# Patient Record
Sex: MALE | Race: WHITE | NOT HISPANIC OR LATINO | Employment: FULL TIME | URBAN - NONMETROPOLITAN AREA
[De-identification: names, ages, dates, MRNs, and addresses within clinical notes are randomized per-mention and may not be internally consistent; named-entity substitution may affect disease eponyms.]

---

## 2017-02-03 ENCOUNTER — HOSPITAL ENCOUNTER (OUTPATIENT)
Dept: LAB | Facility: MEDICAL CENTER | Age: 57
End: 2017-02-03
Attending: NURSE PRACTITIONER
Payer: COMMERCIAL

## 2017-02-03 LAB
T4 FREE SERPL-MCNC: 0.9 NG/DL (ref 0.53–1.43)
TSH SERPL DL<=0.005 MIU/L-ACNC: 4.31 UIU/ML (ref 0.3–3.7)

## 2017-02-03 PROCEDURE — 36415 COLL VENOUS BLD VENIPUNCTURE: CPT

## 2017-02-03 PROCEDURE — 84439 ASSAY OF FREE THYROXINE: CPT

## 2017-02-03 PROCEDURE — 84443 ASSAY THYROID STIM HORMONE: CPT

## 2017-02-10 ENCOUNTER — OFFICE VISIT (OUTPATIENT)
Dept: MEDICAL GROUP | Facility: PHYSICIAN GROUP | Age: 57
End: 2017-02-10
Payer: COMMERCIAL

## 2017-02-10 VITALS
RESPIRATION RATE: 12 BRPM | SYSTOLIC BLOOD PRESSURE: 146 MMHG | OXYGEN SATURATION: 96 % | HEART RATE: 56 BPM | WEIGHT: 280 LBS | DIASTOLIC BLOOD PRESSURE: 90 MMHG | TEMPERATURE: 98.4 F | HEIGHT: 72 IN | BODY MASS INDEX: 37.93 KG/M2

## 2017-02-10 DIAGNOSIS — E03.9 HYPOTHYROIDISM, UNSPECIFIED TYPE: ICD-10-CM

## 2017-02-10 DIAGNOSIS — Z23 NEED FOR TDAP VACCINATION: ICD-10-CM

## 2017-02-10 DIAGNOSIS — R93.89 ABNORMAL CXR: ICD-10-CM

## 2017-02-10 DIAGNOSIS — I35.0 AORTIC VALVE STENOSIS, UNSPECIFIED ETIOLOGY: ICD-10-CM

## 2017-02-10 PROCEDURE — 90715 TDAP VACCINE 7 YRS/> IM: CPT | Performed by: NURSE PRACTITIONER

## 2017-02-10 PROCEDURE — 99213 OFFICE O/P EST LOW 20 MIN: CPT | Mod: 25 | Performed by: NURSE PRACTITIONER

## 2017-02-10 PROCEDURE — 90471 IMMUNIZATION ADMIN: CPT | Performed by: NURSE PRACTITIONER

## 2017-02-10 RX ORDER — LEVOTHYROXINE SODIUM 0.03 MG/1
25 TABLET ORAL
Qty: 90 TAB | Refills: 0 | Status: SHIPPED | OUTPATIENT
Start: 2017-02-10 | End: 2017-05-25 | Stop reason: SDUPTHER

## 2017-02-10 NOTE — ASSESSMENT & PLAN NOTE
Patient takes his medications in am, no missed medications. Patient is having some trouble with sleep, wonders if it is his thyroid is bothering it. Patient will recheck in May.    Ref. Range 9/2/2015 10:09 10/10/2015 07:20 12/2/2016 06:40 2/3/2017 06:07   TSH Latest Ref Range: 0.300-3.700 uIU/mL   5.370 (H) 4.310 (H)   Free T-4 Latest Ref Range: 0.53-1.43 ng/dL   0.90 0.90

## 2017-02-10 NOTE — PROGRESS NOTES
Chief Complaint   Patient presents with   • Results     labs       HISTORY OF PRESENT ILLNESS: Patient is a 56 y.o. male patient who is here to follow-up on his thyroid. Patient was identified as having hypothyroidism 3 months ago. He was started on 25 µg of levothyroxine. And had a retest. He is almost euthyroid. We'll continue to keep him on 25 µg and recheck in a few more months. Patient denies any symptoms such as mental fog, dry skin, brittle nails or hair that is falling out.    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        Hypothyroid        Patient takes his medications in am, no missed medications. Patient is having some trouble with sleep, wonders if it is his thyroid is bothering it. Patient will recheck in May.    Ref. Range 9/2/2015 10:09 10/10/2015 07:20 12/2/2016 06:40 2/3/2017 06:07   TSH Latest Ref Range: 0.300-3.700 uIU/mL   5.370 (H) 4.310 (H)   Free T-4 Latest Ref Range: 0.53-1.43 ng/dL   0.90 0.90       Aortic stenosis  Aorta - 2014 result.   Aortic root, 4.9 cm and the ascending aorta, 3.8 cm dilatation.    Patient had a recheck on his aorta that shows 3.1 cm dilatation. And some calcification around the aortic valve    Patient received a call from Cardiology. Patient will follow up soon. No chest pain, does feel a bit winded. 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     Abnormal CXR  Patient had chest xray over 2 years. Patient will follow up with cardiology first to discuss.              1.  No acute cardiopulmonary process identified.    2.  Enlarged cardiac silhouette    3.  Pulmonary hyperinflation suggesting chronic obstructive pulmonary disease         Reading Provider     Patient continues to need follow-up regarding his possible COPD, enlarged heart.        Allergies:Review of patient's allergies indicates no known allergies.    Current Outpatient Prescriptions Ordered in Epic   Medication Sig  Dispense Refill   • levothyroxine (SYNTHROID) 25 MCG Tab Take 1 Tab by mouth Every morning on an empty stomach. 90 Tab 0   • nystatin/triamcinolone (MYCOLOG) 169709-4.1 UNIT/GM-% Cream      • pravastatin (PRAVACHOL) 20 MG Tab TAKE TWO TABLETS BY MOUTH ONCE DAILY 90 Tab 1   • hydrochlorothiazide (HYDRODIURIL) 25 MG Tab TAKE ONE TABLET BY MOUTH ONCE DAILY 90 Tab 1   • meloxicam (MOBIC) 15 MG tablet Take 1 Tab by mouth every day. 30 Tab 3   • meloxicam (MOBIC) 15 MG tablet Take 1 Tab by mouth every day. 30 Tab 3   • Aug Betamethasone Dipropionate (DIPROLENE-AF) 0.05 % Cream      • aspirin 81 MG tablet Take 81 mg by mouth every day.     • Cholecalciferol (VITAMIN D-3 PO) Take 2,000 mg by mouth every day.       No current [facility]-ordered facility-administered medications on file.       Past Medical History   Diagnosis Date   • Shortness of breath 10/14/2014   • BMI 37.0-37.9, adult 10/14/2014   • Hyperlipidemia 10/14/2014   • Right knee pain 10/15/2014   • Dry skin 10/15/2014   • Accessory skin tags 10/15/2014   • Enlarged heart 10/15/2014   • Unspecified vitamin D deficiency 10/21/2014   • Hypertension    • Heart burn    • Indigestion    • Dental disorder      partial       Social History   Substance Use Topics   • Smoking status: Former Smoker     Quit date: 2013   • Smokeless tobacco: Never Used   • Alcohol Use: Yes      Comment: 1-2 a night       Family Status   Relation Status Death Age   • Mother     • Father     • Sister     • Brother Alive      Family History   Problem Relation Age of Onset   • Diabetes Mother    • Cancer Father    • Heart Disease Father    • Heart Failure Father        ROS: As documented in my HPI      Exam:  Blood pressure 146/90, pulse 56, temperature 36.9 °C (98.4 °F), resp. rate 12, height 1.829 m (6'), weight 127.007 kg (280 lb), SpO2 96 %.  General:  Well nourished, well developed male in NAD  Head: No lesions, Non tender scalp  Neck: Supple. Symmetric    Pulmonary:  Normal effort. No rales, ronchi, or wheezing.  Cardiovascular: Regular rate and rhythm without murmur.   Extremities: no clubbing, cyanosis, or edema.  Psych: Alert and oriented x3. Normal mood and affect.   Neurological: No focal deficits    Please note that this dictation was created using voice recognition software. I have made every reasonable attempt to correct obvious errors, but I expect that there are errors of grammar and possibly content that I did not discover before finalizing the note.    Assessment/Plan:  1. Hypothyroidism, unspecified type -  Almost controlled, will continue with RX and recheck  levothyroxine (SYNTHROID) 25 MCG Tab    TSH+FREE T4   2. Aortic valve stenosis, unspecified etiology   patient to follow-up with cardiology before next visit.    3. Abnormal CXR   patient to follow up with cardiology before next visit.    4. Need for Tdap vaccination  Tdap =>6yo IM

## 2017-02-10 NOTE — ASSESSMENT & PLAN NOTE
Patient had chest xray over 2 years. Patient will follow up with cardiology first to discuss.              1.  No acute cardiopulmonary process identified.    2.  Enlarged cardiac silhouette    3.  Pulmonary hyperinflation suggesting chronic obstructive pulmonary disease         Reading Provider

## 2017-02-10 NOTE — MR AVS SNAPSHOT
Gagandeep Durbin Jr.   2/10/2017 8:00 AM   Office Visit   MRN: [id number]    Department:  [facility]   Dept Phone:  [phone]    Description:  Male : 1960   Provider:  FELIX Fournier           Reason for Visit     Results labs      Allergies as of 2/10/2017     No Known Allergies      You were diagnosed with     Hypothyroidism, unspecified type   [9532426]       Aortic valve stenosis, unspecified etiology   [1477142]       Abnormal CXR   [641656]       Need for Tdap vaccination   [103233]         Vital Signs     Blood Pressure Pulse Temperature Respirations Height Weight    146/90 mmHg 56 36.9 °C (98.4 °F) 12 1.829 m (6') 127.007 kg (280 lb)    Body Mass Index Oxygen Saturation Smoking Status             37.97 kg/m2 96% Former Smoker         Basic Information     Date Of Birth Sex Race Ethnicity Preferred Language    1960 Male White Non- English      Your appointments     2017 11:20 AM   FOLLOW UP with Steve Ruelas M.D.   [facility] (--)    [address], [facility] [address]  [facility] [id number] [phone]            2017  8:00 AM   Adult Draw/Collection with [facility] (--)    [address] [place] [address] [address] [place] [address] [phone]            May 05, 2017  8:00 AM   Established Patient with FELIX Fournier   [facility] [place]    [address] [place] [address]  [place] [id number] [phone]           You will be receiving a confirmation call a few days before your appointment from our automated call confirmation system.              Problem List              ICD-10-CM Priority Class Noted - Resolved    Shortness of breath R06.02 High  10/14/2014 - Present    BMI 37.0-37.9, adult Z68.37   10/14/2014 - Present    Hyperlipidemia E78.5 High  10/14/2014 - Present    Right knee pain M25.561   10/15/2014 - Present    Dry skin L85.3   10/15/2014 - Present    Accessory skin tags Q82.8    10/15/2014 - Present    Vitamin D deficiency E55.9   10/21/2014 - Present    HTN (hypertension) I10 High  10/30/2014 - Present    Abnormal CXR R93.8 Medium  11/24/2014 - Present    Abnormal EKG R94.31   11/24/2014 - Present    Aortic stenosis I35.0 Medium  1/20/2015 - Present    Aortic valve disorder I35.9 High  1/28/2015 - Present    Hypothyroid E03.9   12/6/2016 - Present    Need for Tdap vaccination Z23   2/10/2017 - Present      Health Maintenance        Date Due Completion Dates    IMM DTaP/Tdap/Td Vaccine (1 - Tdap) 3/25/1977 ---    IMM INFLUENZA (1) 9/1/2016 ---    COLONOSCOPY 10/1/2021 10/1/2016 (N/S), 3/1/2013 (N/S)    Override on 10/1/2016: (N/S)    Override on 3/1/2013: (N/S)            Current Immunizations     Tdap Vaccine  Incomplete      Below and/or attached are the medications your provider expects you to take. Review all of your home medications and newly ordered medications with your provider and/or pharmacist. Follow medication instructions as directed by your provider and/or pharmacist. Please keep your medication list with you and share with your provider. Update the information when medications are discontinued, doses are changed, or new medications (including over-the-counter products) are added; and carry medication information at all times in the event of emergency situations     Allergies:  No Known Allergies          Medications  Valid as of: February 10, 2017 -  8:41 AM    Generic Name Brand Name Tablet Size Instructions for use    Aspirin (Tab) aspirin 81 MG Take 81 mg by mouth every day.        Betamethasone Dipropionate Aug (Cream) DIPROLENE-AF 0.05 %         Cholecalciferol   Take 2,000 mg by mouth every day.        HydroCHLOROthiazide (Tab) HYDRODIURIL 25 MG TAKE ONE TABLET BY MOUTH ONCE DAILY        Levothyroxine Sodium (Tab) SYNTHROID 25 MCG Take 1 Tab by mouth Every morning on an empty stomach.        Meloxicam (Tab) MOBIC 15 MG Take 1 Tab by mouth every day.        Meloxicam  (Tab) MOBIC 15 MG Take 1 Tab by mouth every day.        Nystatin-Triamcinolone (Cream) MYCOLOG 431955-2.1 UNIT/GM-%         Pravastatin Sodium (Tab) PRAVACHOL 20 MG TAKE TWO TABLETS BY MOUTH ONCE DAILY        .                 Medicines prescribed today were sent to:     [facility] [address] [place] [address], [place] - [address] [facility]    [address] [facility] [place] [address]    Phone: [phone] Fax: [phone]    Open 24 Hours?: No      Medication refill instructions:       If your prescription bottle indicates you have medication refills left, it is not necessary to call your provider’s office. Please contact your pharmacy and they will refill your medication.    If your prescription bottle indicates you do not have any refills left, you may request refills at any time through one of the following ways: The online [employer] system (except Urgent Care), by calling your provider’s office, or by asking your pharmacy to contact your provider’s office with a refill request. Medication refills are processed only during regular business hours and may not be available until the next business day. Your provider may request additional information or to have a follow-up visit with you prior to refilling your medication.   *Please Note: Medication refills are assigned a new Rx number when refilled electronically. Your pharmacy may indicate that no refills were authorized even though a new prescription for the same medication is available at the pharmacy. Please request the medicine by name with the pharmacy before contacting your provider for a refill.           [employer] Access Code: [email or web address]  Expires: 3/7/2017  9:58 AM    [employer]  A secure, online tool to manage your health information     [employer]® is a secure, online tool that connects you to your personalized health information from the privacy of your home -- day or night - making it very easy for you to manage your healthcare. Once the  activation process is completed, you can even access your medical information using the [employer] katiana, which is available for free in the Apple Katiana store or Google Play store.     [employer] provides the following levels of access (as shown below):   My Chart Features   [facility] Doctor [facility]  Specialists [facility]  Urgent  Care Non-[facility]  Primary Care  Doctor   Email your healthcare team securely and privately 24/7 X X X    Manage appointments: schedule your next appointment; view details of past/upcoming appointments X      Request prescription refills. X      View recent personal medical records, including lab and immunizations X X X X   View health record, including health history, allergies, medications X X X X   Read reports about your outpatient visits, procedures, consult and ER notes X X X X   See your discharge summary, which is a recap of your hospital and/or ER visit that includes your diagnosis, lab results, and care plan. X X       How to register for [employer]:  1. Go to  [email or web address].  2. Click on the Sign Up Now box, which takes you to the New Member Sign Up page. You will need to provide the following information:  a. Enter your [employer] Access Code exactly as it appears at the top of this page. (You will not need to use this code after you’ve completed the sign-up process. If you do not sign up before the expiration date, you must request a new code.)   b. Enter your date of birth.   c. Enter your home email address.   d. Click Submit, and follow the next screen’s instructions.  3. Create a [employer] ID. This will be your [employer] login ID and cannot be changed, so think of one that is secure and easy to remember.  4. Create a [employer] password. You can change your password at any time.  5. Enter your Password Reset Question and Answer. This can be used at a later time if you forget your password.   6. Enter your e-mail address. This allows you to receive e-mail notifications when new  information is available in [employer].  7. Click Sign Up. You can now view your health information.    For assistance activating your [employer] account, call [phone]

## 2017-02-10 NOTE — ASSESSMENT & PLAN NOTE
Aorta - 2014 result.   Aortic root, 4.9 cm and the ascending aorta, 3.8 cm dilatation.      Patient received a call from Cardiology. Patient will follow up soon. No chest pain, does feel a bit winded. 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

## 2017-02-25 ENCOUNTER — HOSPITAL ENCOUNTER (INPATIENT)
Facility: MEDICAL CENTER | Age: 57
LOS: 1 days | DRG: 281 | End: 2017-02-26
Attending: EMERGENCY MEDICINE | Admitting: INTERNAL MEDICINE
Payer: COMMERCIAL

## 2017-02-25 ENCOUNTER — APPOINTMENT (OUTPATIENT)
Dept: RADIOLOGY | Facility: MEDICAL CENTER | Age: 57
DRG: 281 | End: 2017-02-25
Attending: EMERGENCY MEDICINE
Payer: COMMERCIAL

## 2017-02-25 ENCOUNTER — RESOLUTE PROFESSIONAL BILLING HOSPITAL PROF FEE (OUTPATIENT)
Dept: HOSPITALIST | Facility: MEDICAL CENTER | Age: 57
End: 2017-02-25
Payer: COMMERCIAL

## 2017-02-25 DIAGNOSIS — I49.9 CARDIAC ARRHYTHMIA, UNSPECIFIED CARDIAC ARRHYTHMIA TYPE: ICD-10-CM

## 2017-02-25 DIAGNOSIS — R55 NEAR SYNCOPE: ICD-10-CM

## 2017-02-25 PROBLEM — R42 POSTURAL DIZZINESS WITH PRESYNCOPE: Status: ACTIVE | Noted: 2017-02-25

## 2017-02-25 LAB
ALBUMIN SERPL BCP-MCNC: 3.6 G/DL (ref 3.2–4.9)
ALBUMIN/GLOB SERPL: 1.2 G/DL
ALP SERPL-CCNC: 50 U/L (ref 30–99)
ALT SERPL-CCNC: 31 U/L (ref 2–50)
ANION GAP SERPL CALC-SCNC: 7 MMOL/L (ref 0–11.9)
APPEARANCE UR: CLEAR
APTT PPP: 27 SEC (ref 24.7–36)
AST SERPL-CCNC: 22 U/L (ref 12–45)
BASOPHILS # BLD AUTO: 0.6 % (ref 0–1.8)
BASOPHILS # BLD: 0.04 K/UL (ref 0–0.12)
BILIRUB SERPL-MCNC: 0.8 MG/DL (ref 0.1–1.5)
BILIRUB UR QL STRIP.AUTO: NEGATIVE
BNP SERPL-MCNC: 398 PG/ML (ref 0–100)
BUN SERPL-MCNC: 25 MG/DL (ref 8–22)
CALCIUM SERPL-MCNC: 9.5 MG/DL (ref 8.5–10.5)
CHLORIDE SERPL-SCNC: 103 MMOL/L (ref 96–112)
CO2 SERPL-SCNC: 23 MMOL/L (ref 20–33)
COLOR UR: YELLOW
CREAT SERPL-MCNC: 1.1 MG/DL (ref 0.5–1.4)
CULTURE IF INDICATED INDCX: YES UA CULTURE
EKG IMPRESSION: NORMAL
EOSINOPHIL # BLD AUTO: 0.06 K/UL (ref 0–0.51)
EOSINOPHIL NFR BLD: 0.9 % (ref 0–6.9)
EPI CELLS #/AREA URNS HPF: ABNORMAL /HPF
ERYTHROCYTE [DISTWIDTH] IN BLOOD BY AUTOMATED COUNT: 42.1 FL (ref 35.9–50)
ETHANOL BLD-MCNC: 0 G/DL
GFR SERPL CREATININE-BSD FRML MDRD: >60 ML/MIN/1.73 M 2
GLOBULIN SER CALC-MCNC: 2.9 G/DL (ref 1.9–3.5)
GLUCOSE SERPL-MCNC: 114 MG/DL (ref 65–99)
GLUCOSE UR STRIP.AUTO-MCNC: NEGATIVE MG/DL
HCT VFR BLD AUTO: 45.3 % (ref 42–52)
HGB BLD-MCNC: 15.5 G/DL (ref 14–18)
IMM GRANULOCYTES # BLD AUTO: 0.04 K/UL (ref 0–0.11)
IMM GRANULOCYTES NFR BLD AUTO: 0.6 % (ref 0–0.9)
INR PPP: 0.99 (ref 0.87–1.13)
KETONES UR STRIP.AUTO-MCNC: NEGATIVE MG/DL
LEUKOCYTE ESTERASE UR QL STRIP.AUTO: ABNORMAL
LYMPHOCYTES # BLD AUTO: 1.16 K/UL (ref 1–4.8)
LYMPHOCYTES NFR BLD: 16.5 % (ref 22–41)
MAGNESIUM SERPL-MCNC: 1.7 MG/DL (ref 1.5–2.5)
MCH RBC QN AUTO: 31.8 PG (ref 27–33)
MCHC RBC AUTO-ENTMCNC: 34.2 G/DL (ref 33.7–35.3)
MCV RBC AUTO: 92.8 FL (ref 81.4–97.8)
MICRO URNS: ABNORMAL
MONOCYTES # BLD AUTO: 0.47 K/UL (ref 0–0.85)
MONOCYTES NFR BLD AUTO: 6.7 % (ref 0–13.4)
NEUTROPHILS # BLD AUTO: 5.27 K/UL (ref 1.82–7.42)
NEUTROPHILS NFR BLD: 74.7 % (ref 44–72)
NITRITE UR QL STRIP.AUTO: NEGATIVE
NRBC # BLD AUTO: 0 K/UL
NRBC BLD AUTO-RTO: 0 /100 WBC
PH UR STRIP.AUTO: 6 [PH]
PHOSPHATE SERPL-MCNC: 3.2 MG/DL (ref 2.5–4.5)
PLATELET # BLD AUTO: 191 K/UL (ref 164–446)
PMV BLD AUTO: 11 FL (ref 9–12.9)
POTASSIUM SERPL-SCNC: 4.4 MMOL/L (ref 3.6–5.5)
PROT SERPL-MCNC: 6.5 G/DL (ref 6–8.2)
PROT UR QL STRIP: NEGATIVE MG/DL
PROTHROMBIN TIME: 13.4 SEC (ref 12–14.6)
RBC # BLD AUTO: 4.88 M/UL (ref 4.7–6.1)
RBC # URNS HPF: ABNORMAL /HPF
RBC UR QL AUTO: NEGATIVE
SODIUM SERPL-SCNC: 133 MMOL/L (ref 135–145)
SP GR UR STRIP.AUTO: 1.01
TROPONIN I SERPL-MCNC: 0.06 NG/ML (ref 0–0.04)
WBC # BLD AUTO: 7 K/UL (ref 4.8–10.8)
WBC #/AREA URNS HPF: ABNORMAL /HPF

## 2017-02-25 PROCEDURE — 93005 ELECTROCARDIOGRAM TRACING: CPT | Performed by: INTERNAL MEDICINE

## 2017-02-25 PROCEDURE — 87086 URINE CULTURE/COLONY COUNT: CPT

## 2017-02-25 PROCEDURE — 84100 ASSAY OF PHOSPHORUS: CPT

## 2017-02-25 PROCEDURE — 71010 DX-CHEST-PORTABLE (1 VIEW): CPT

## 2017-02-25 PROCEDURE — 770020 HCHG ROOM/CARE - TELE (206)

## 2017-02-25 PROCEDURE — 99223 1ST HOSP IP/OBS HIGH 75: CPT | Performed by: INTERNAL MEDICINE

## 2017-02-25 PROCEDURE — 99285 EMERGENCY DEPT VISIT HI MDM: CPT

## 2017-02-25 PROCEDURE — 36415 COLL VENOUS BLD VENIPUNCTURE: CPT

## 2017-02-25 PROCEDURE — 84484 ASSAY OF TROPONIN QUANT: CPT

## 2017-02-25 PROCEDURE — 85025 COMPLETE CBC W/AUTO DIFF WBC: CPT

## 2017-02-25 PROCEDURE — 93005 ELECTROCARDIOGRAM TRACING: CPT | Performed by: EMERGENCY MEDICINE

## 2017-02-25 PROCEDURE — A9270 NON-COVERED ITEM OR SERVICE: HCPCS | Performed by: INTERNAL MEDICINE

## 2017-02-25 PROCEDURE — 80053 COMPREHEN METABOLIC PANEL: CPT

## 2017-02-25 PROCEDURE — 700102 HCHG RX REV CODE 250 W/ 637 OVERRIDE(OP): Performed by: INTERNAL MEDICINE

## 2017-02-25 PROCEDURE — 81001 URINALYSIS AUTO W/SCOPE: CPT

## 2017-02-25 PROCEDURE — 80307 DRUG TEST PRSMV CHEM ANLYZR: CPT

## 2017-02-25 PROCEDURE — 70450 CT HEAD/BRAIN W/O DYE: CPT

## 2017-02-25 PROCEDURE — 83880 ASSAY OF NATRIURETIC PEPTIDE: CPT

## 2017-02-25 PROCEDURE — 83735 ASSAY OF MAGNESIUM: CPT

## 2017-02-25 PROCEDURE — 85610 PROTHROMBIN TIME: CPT

## 2017-02-25 PROCEDURE — 85730 THROMBOPLASTIN TIME PARTIAL: CPT

## 2017-02-25 PROCEDURE — 700111 HCHG RX REV CODE 636 W/ 250 OVERRIDE (IP): Performed by: INTERNAL MEDICINE

## 2017-02-25 PROCEDURE — 93005 ELECTROCARDIOGRAM TRACING: CPT

## 2017-02-25 PROCEDURE — 306589 SLEEVE,VASO CALF LARGE: Performed by: INTERNAL MEDICINE

## 2017-02-25 RX ORDER — PROMETHAZINE HYDROCHLORIDE 25 MG/1
12.5-25 TABLET ORAL EVERY 4 HOURS PRN
Status: DISCONTINUED | OUTPATIENT
Start: 2017-02-25 | End: 2017-02-26 | Stop reason: HOSPADM

## 2017-02-25 RX ORDER — PRAVASTATIN SODIUM 20 MG
40 TABLET ORAL EVERY EVENING
Status: DISCONTINUED | OUTPATIENT
Start: 2017-02-26 | End: 2017-02-26 | Stop reason: HOSPADM

## 2017-02-25 RX ORDER — LORAZEPAM 1 MG/1
0.5 TABLET ORAL EVERY 4 HOURS PRN
Status: DISCONTINUED | OUTPATIENT
Start: 2017-02-25 | End: 2017-02-26 | Stop reason: HOSPADM

## 2017-02-25 RX ORDER — ENEMA 19; 7 G/133ML; G/133ML
1 ENEMA RECTAL
Status: DISCONTINUED | OUTPATIENT
Start: 2017-02-25 | End: 2017-02-26 | Stop reason: HOSPADM

## 2017-02-25 RX ORDER — LORAZEPAM 1 MG/1
3 TABLET ORAL
Status: DISCONTINUED | OUTPATIENT
Start: 2017-02-25 | End: 2017-02-26 | Stop reason: HOSPADM

## 2017-02-25 RX ORDER — LORAZEPAM 2 MG/ML
1.5 INJECTION INTRAMUSCULAR
Status: DISCONTINUED | OUTPATIENT
Start: 2017-02-25 | End: 2017-02-26 | Stop reason: HOSPADM

## 2017-02-25 RX ORDER — FOLIC ACID 1 MG/1
1 TABLET ORAL DAILY
Status: CANCELLED | OUTPATIENT
Start: 2017-02-26 | End: 2017-03-02

## 2017-02-25 RX ORDER — LORAZEPAM 2 MG/ML
2 INJECTION INTRAMUSCULAR
Status: DISCONTINUED | OUTPATIENT
Start: 2017-02-25 | End: 2017-02-26 | Stop reason: HOSPADM

## 2017-02-25 RX ORDER — AMOXICILLIN 250 MG
1 CAPSULE ORAL NIGHTLY
Status: DISCONTINUED | OUTPATIENT
Start: 2017-02-25 | End: 2017-02-26 | Stop reason: HOSPADM

## 2017-02-25 RX ORDER — THIAMINE MONONITRATE (VIT B1) 100 MG
100 TABLET ORAL DAILY
Status: CANCELLED | OUTPATIENT
Start: 2017-02-26 | End: 2017-03-02

## 2017-02-25 RX ORDER — LORAZEPAM 2 MG/ML
0.5 INJECTION INTRAMUSCULAR EVERY 4 HOURS PRN
Status: DISCONTINUED | OUTPATIENT
Start: 2017-02-25 | End: 2017-02-26 | Stop reason: HOSPADM

## 2017-02-25 RX ORDER — DOCUSATE SODIUM 100 MG/1
100 CAPSULE, LIQUID FILLED ORAL 2 TIMES DAILY
Status: DISCONTINUED | OUTPATIENT
Start: 2017-02-25 | End: 2017-02-26 | Stop reason: HOSPADM

## 2017-02-25 RX ORDER — LACTULOSE 20 G/30ML
30 SOLUTION ORAL
Status: DISCONTINUED | OUTPATIENT
Start: 2017-02-25 | End: 2017-02-26 | Stop reason: HOSPADM

## 2017-02-25 RX ORDER — LORAZEPAM 2 MG/ML
1 INJECTION INTRAMUSCULAR
Status: DISCONTINUED | OUTPATIENT
Start: 2017-02-25 | End: 2017-02-26 | Stop reason: HOSPADM

## 2017-02-25 RX ORDER — FUROSEMIDE 10 MG/ML
40 INJECTION INTRAMUSCULAR; INTRAVENOUS
Status: DISCONTINUED | OUTPATIENT
Start: 2017-02-25 | End: 2017-02-26

## 2017-02-25 RX ORDER — BETAMETHASONE DIPROPIONATE 0.5 MG/G
1 CREAM TOPICAL 2 TIMES DAILY
COMMUNITY
End: 2017-12-14

## 2017-02-25 RX ORDER — LORAZEPAM 1 MG/1
4 TABLET ORAL
Status: DISCONTINUED | OUTPATIENT
Start: 2017-02-25 | End: 2017-02-26 | Stop reason: HOSPADM

## 2017-02-25 RX ORDER — BISACODYL 10 MG
10 SUPPOSITORY, RECTAL RECTAL
Status: DISCONTINUED | OUTPATIENT
Start: 2017-02-25 | End: 2017-02-26 | Stop reason: HOSPADM

## 2017-02-25 RX ORDER — PROMETHAZINE HYDROCHLORIDE 25 MG/1
12.5-25 SUPPOSITORY RECTAL EVERY 4 HOURS PRN
Status: DISCONTINUED | OUTPATIENT
Start: 2017-02-25 | End: 2017-02-26 | Stop reason: HOSPADM

## 2017-02-25 RX ORDER — FOLIC ACID 1 MG/1
1 TABLET ORAL DAILY
Status: DISCONTINUED | OUTPATIENT
Start: 2017-02-25 | End: 2017-02-26 | Stop reason: HOSPADM

## 2017-02-25 RX ORDER — CHOLECALCIFEROL (VITAMIN D3) 25 MCG
2000 CAPSULE ORAL DAILY
Status: DISCONTINUED | OUTPATIENT
Start: 2017-02-25 | End: 2017-02-25

## 2017-02-25 RX ORDER — LORAZEPAM 1 MG/1
2 TABLET ORAL
Status: DISCONTINUED | OUTPATIENT
Start: 2017-02-25 | End: 2017-02-26 | Stop reason: HOSPADM

## 2017-02-25 RX ORDER — ONDANSETRON 2 MG/ML
4 INJECTION INTRAMUSCULAR; INTRAVENOUS EVERY 4 HOURS PRN
Status: DISCONTINUED | OUTPATIENT
Start: 2017-02-25 | End: 2017-02-26 | Stop reason: HOSPADM

## 2017-02-25 RX ORDER — HEPARIN SODIUM 5000 [USP'U]/ML
5000 INJECTION, SOLUTION INTRAVENOUS; SUBCUTANEOUS EVERY 8 HOURS
Status: DISCONTINUED | OUTPATIENT
Start: 2017-02-25 | End: 2017-02-26 | Stop reason: HOSPADM

## 2017-02-25 RX ORDER — MELOXICAM 15 MG/1
15 TABLET ORAL DAILY
Status: DISCONTINUED | OUTPATIENT
Start: 2017-02-25 | End: 2017-02-25

## 2017-02-25 RX ORDER — LEVOTHYROXINE SODIUM 0.03 MG/1
25 TABLET ORAL
Status: DISCONTINUED | OUTPATIENT
Start: 2017-02-26 | End: 2017-02-26 | Stop reason: HOSPADM

## 2017-02-25 RX ORDER — THIAMINE MONONITRATE (VIT B1) 100 MG
100 TABLET ORAL DAILY
Status: DISCONTINUED | OUTPATIENT
Start: 2017-02-25 | End: 2017-02-26 | Stop reason: HOSPADM

## 2017-02-25 RX ORDER — LISINOPRIL 5 MG/1
5 TABLET ORAL DAILY
Status: DISCONTINUED | OUTPATIENT
Start: 2017-02-25 | End: 2017-02-26 | Stop reason: HOSPADM

## 2017-02-25 RX ORDER — POTASSIUM CHLORIDE 20 MEQ/1
40 TABLET, EXTENDED RELEASE ORAL DAILY
Status: DISCONTINUED | OUTPATIENT
Start: 2017-02-25 | End: 2017-02-26 | Stop reason: HOSPADM

## 2017-02-25 RX ORDER — AMOXICILLIN 250 MG
1 CAPSULE ORAL
Status: DISCONTINUED | OUTPATIENT
Start: 2017-02-25 | End: 2017-02-26 | Stop reason: HOSPADM

## 2017-02-25 RX ORDER — ONDANSETRON 4 MG/1
4 TABLET, ORALLY DISINTEGRATING ORAL EVERY 4 HOURS PRN
Status: DISCONTINUED | OUTPATIENT
Start: 2017-02-25 | End: 2017-02-26 | Stop reason: HOSPADM

## 2017-02-25 RX ORDER — LORAZEPAM 1 MG/1
1 TABLET ORAL EVERY 4 HOURS PRN
Status: DISCONTINUED | OUTPATIENT
Start: 2017-02-25 | End: 2017-02-26 | Stop reason: HOSPADM

## 2017-02-25 RX ADMIN — HEPARIN SODIUM 5000 UNITS: 5000 INJECTION, SOLUTION INTRAVENOUS; SUBCUTANEOUS at 21:44

## 2017-02-25 RX ADMIN — FOLIC ACID 1 MG: 1 TABLET ORAL at 15:18

## 2017-02-25 RX ADMIN — POTASSIUM CHLORIDE 40 MEQ: 1500 TABLET, EXTENDED RELEASE ORAL at 15:18

## 2017-02-25 RX ADMIN — THERA TABS 1 TABLET: TAB at 15:18

## 2017-02-25 RX ADMIN — FUROSEMIDE 40 MG: 10 INJECTION, SOLUTION INTRAVENOUS at 15:19

## 2017-02-25 RX ADMIN — Medication 100 MG: at 15:17

## 2017-02-25 RX ADMIN — LISINOPRIL 5 MG: 5 TABLET ORAL at 15:17

## 2017-02-25 ASSESSMENT — LIFESTYLE VARIABLES
ON A TYPICAL DAY WHEN YOU DRINK ALCOHOL HOW MANY DRINKS DO YOU HAVE: 16
ANXIETY: NO ANXIETY (AT EASE)
AUDITORY DISTURBANCES: NOT PRESENT
HOW MANY TIMES IN THE PAST YEAR HAVE YOU HAD 5 OR MORE DRINKS IN A DAY: 250
EVER HAD A DRINK FIRST THING IN THE MORNING TO STEADY YOUR NERVES TO GET RID OF A HANGOVER: NO
TOTAL SCORE: 1
NAUSEA AND VOMITING: NO NAUSEA AND NO VOMITING
TOTAL SCORE: 0
VISUAL DISTURBANCES: NOT PRESENT
TOTAL SCORE: 1
TREMOR: NO TREMOR
HEADACHE, FULLNESS IN HEAD: NOT PRESENT
AGITATION: NORMAL ACTIVITY
ANXIETY: NO ANXIETY (AT EASE)
AGITATION: NORMAL ACTIVITY
VISUAL DISTURBANCES: NOT PRESENT
AUDITORY DISTURBANCES: NOT PRESENT
AGITATION: NORMAL ACTIVITY
CONSUMPTION TOTAL: POSITIVE
TREMOR: NO TREMOR
ORIENTATION AND CLOUDING OF SENSORIUM: ORIENTED AND CAN DO SERIAL ADDITIONS
EVER_SMOKED: YES
TOTAL SCORE: 0
PACK_YEARS: 42
HAVE YOU EVER FELT YOU SHOULD CUT DOWN ON YOUR DRINKING: YES
VISUAL DISTURBANCES: NOT PRESENT
TOTAL SCORE: 1
PAROXYSMAL SWEATS: NO SWEAT VISIBLE
EVER FELT BAD OR GUILTY ABOUT YOUR DRINKING: NO
PAROXYSMAL SWEATS: NO SWEAT VISIBLE
NAUSEA AND VOMITING: NO NAUSEA AND NO VOMITING
NAUSEA AND VOMITING: NO NAUSEA AND NO VOMITING
ORIENTATION AND CLOUDING OF SENSORIUM: ORIENTED AND CAN DO SERIAL ADDITIONS
PAROXYSMAL SWEATS: NO SWEAT VISIBLE
ALCOHOL_USE: YES
HEADACHE, FULLNESS IN HEAD: NOT PRESENT
ANXIETY: NO ANXIETY (AT EASE)
AUDITORY DISTURBANCES: NOT PRESENT
TREMOR: NO TREMOR
HEADACHE, FULLNESS IN HEAD: NOT PRESENT
HAVE PEOPLE ANNOYED YOU BY CRITICIZING YOUR DRINKING: NO
TOTAL SCORE: 0
ORIENTATION AND CLOUDING OF SENSORIUM: ORIENTED AND CAN DO SERIAL ADDITIONS
AVERAGE NUMBER OF DAYS PER WEEK YOU HAVE A DRINK CONTAINING ALCOHOL: 4

## 2017-02-25 ASSESSMENT — PAIN SCALES - GENERAL
PAINLEVEL_OUTOF10: 0

## 2017-02-25 ASSESSMENT — COPD QUESTIONNAIRES
COPD SCREENING SCORE: 4
HAVE YOU SMOKED AT LEAST 100 CIGARETTES IN YOUR ENTIRE LIFE: YES
DURING THE PAST 4 WEEKS HOW MUCH DID YOU FEEL SHORT OF BREATH: SOME OF THE TIME
DO YOU EVER COUGH UP ANY MUCUS OR PHLEGM?: NO/ONLY WITH OCCASIONAL COLDS OR INFECTIONS

## 2017-02-25 NOTE — ED PROVIDER NOTES
"ED Provider Note    Scribed for Ernestine Sandoval M.D. by Rashmi aBrcenas. 2/25/2017  9:28 AM    Primary care provider: FELIX Fournier  Means of arrival: walk-in  History obtained from: patient  History limited by: none    CHIEF COMPLAINT  Chief Complaint   Patient presents with   • Vertigo     Reports got up this AM, felt like something  was wrong.  Felt lightheaded and was incont of urine.  states same occurance X 3 this week. EKG done, showing bigeminal PVCs.  Hx.calsifying aortic valve.  has not seen cardiologist in a year. states he was suposed to f/u.  states feels OK now.       HPI  Gagandeep Durbin Jr. is a 56 y.o. male smoker, who presents to the Emergency Department for evaluation of a presyncopal event, onset this morning. Patient reports he had associated generalized \"tingling feeling\" at which time he experienced urinary incontinence. He denies loss of consciousness. His symptoms have resolved upon arrival. He states he has had three similar episodes within the last week with no associated urinary incontinence with these previous episodes of presyncope. Patient denies diaphoresis, nausea, orthopnea, chest pain, abdominal pain, facial droop.He has a history of hypertension and a family history of diabetes mellitus. He quit smoking 3-4 months ago. He adds that he drinks a pint of Tequila per day on average.       REVIEW OF SYSTEMS  CARDIAC: no chest pain, no palpitations    PULMONARY: no orthopnea,    GI: positive urinary incontinence, no nausea, vomiting, or abdominal pain   : no dysuria, back pain, or hematuria   Neuro: tingling, presyncope, no facial droop, loss of consciousness   See history of present illness. All other systems are negative. C.       PAST MEDICAL HISTORY   has a past medical history of Shortness of breath (10/14/2014); BMI 37.0-37.9, adult (10/14/2014); Hyperlipidemia (10/14/2014); Right knee pain (10/15/2014); Dry skin (10/15/2014); Accessory skin tags (10/15/2014); Enlarged heart " "(10/15/2014); Unspecified vitamin D deficiency (10/21/2014); Hypertension; Heart burn; Indigestion; and Dental disorder.    SURGICAL HISTORY   has past surgical history that includes recovery (1/28/2015).    SOCIAL HISTORY  Social History   Substance Use Topics   • Smoking status: Former Smoker     Quit date: 05/14/2013   • Smokeless tobacco: Never Used   • Alcohol Use: Yes      Comment: 1-2 a night      History   Drug Use No       FAMILY HISTORY  Family History   Problem Relation Age of Onset   • Diabetes Mother    • Cancer Father    • Heart Disease Father    • Heart Failure Father        CURRENT MEDICATIONS  Home Medications     Reviewed by Alec Zheng, STUDENT (Student Nurse) on 02/25/17 at 1403  Med List Status: Complete    Medication Last Dose Status    aspirin 81 MG tablet 2/25/2017 Active    Aug Betamethasone Dipropionate (DIPROLENE-AF) 0.05 % Cream 2/24/2017 Active    Cholecalciferol (VITAMIN D-3 PO) 2/23/2017 Active    Garlic 1000 MG Cap 2/23/2017 Active    hydrochlorothiazide (HYDRODIURIL) 25 MG Tab 2/25/2017 Active    levothyroxine (SYNTHROID) 25 MCG Tab 2/25/2017 Active    pravastatin (PRAVACHOL) 20 MG Tab 2/25/2017 Active                ALLERGIES  No Known Allergies    PHYSICAL EXAM  VITAL SIGNS: /68 mmHg  Pulse 40  Temp(Src) 36.5 °C (97.7 °F)  Resp 18  Ht 1.854 m (6' 1\")  Wt 127.1 kg (280 lb 3.3 oz)  BMI 36.98 kg/m2  SpO2 96%    Constitutional: Well developed, Well nourished, No acute distress, Non-toxic appearance.   HEENT: Normocephalic, Atraumatic,  external ears normal, pharynx pink,  Mucous  Membranes moist, No rhinorrhea or mucosal edema  Eyes: PERRL, EOMI, Conjunctiva normal, No discharge.   Neck: Normal range of motion, No tenderness, Supple, No stridor.   Lymphatic: No lymphadenopathy    Cardiovascular:  Irregular heart rhythm, 2/6 systolic murmur heard at the right sternal border,  rubs, or gallops.   Thorax & Lungs:  Lungs clear to auscultation bilaterally, No respiratory " distress, No wheezes, rhales or rhonchi, No chest wall tenderness.   Abdomen: Bowel sounds normal, Soft, non tender, non distended,  No pulsatile masses., no rebound guarding or peritoneal signs.   Skin: Warm, Dry, No erythema, No rash,   Back:  No CVA tenderness,  No spinal tenderness, bony crepitance, step offs, or instability.   Neurologic: Alert & oriented x 3, Normal motor function, Normal sensory function, No focal deficits noted. Normal reflexes. Normal Cranial Nerves.  Extremities: Equal, intact distal pulses, No cyanosis, clubbing or edema,  No tenderness.   Musculoskeletal: Good range of motion in all major joints. No tenderness to palpation or major deformities noted.     DIAGNOSTIC STUDIES / PROCEDURES    LABS  Results for orders placed or performed during the hospital encounter of 02/25/17   CBC WITH DIFFERENTIAL   Result Value Ref Range    WBC 7.0 4.8 - 10.8 K/uL    RBC 4.88 4.70 - 6.10 M/uL    Hemoglobin 15.5 14.0 - 18.0 g/dL    Hematocrit 45.3 42.0 - 52.0 %    MCV 92.8 81.4 - 97.8 fL    MCH 31.8 27.0 - 33.0 pg    MCHC 34.2 33.7 - 35.3 g/dL    RDW 42.1 35.9 - 50.0 fL    Platelet Count 191 164 - 446 K/uL    MPV 11.0 9.0 - 12.9 fL    Neutrophils-Polys 74.70 (H) 44.00 - 72.00 %    Lymphocytes 16.50 (L) 22.00 - 41.00 %    Monocytes 6.70 0.00 - 13.40 %    Eosinophils 0.90 0.00 - 6.90 %    Basophils 0.60 0.00 - 1.80 %    Immature Granulocytes 0.60 0.00 - 0.90 %    Nucleated RBC 0.00 /100 WBC    Neutrophils (Absolute) 5.27 1.82 - 7.42 K/uL    Lymphs (Absolute) 1.16 1.00 - 4.80 K/uL    Monos (Absolute) 0.47 0.00 - 0.85 K/uL    Eos (Absolute) 0.06 0.00 - 0.51 K/uL    Baso (Absolute) 0.04 0.00 - 0.12 K/uL    Immature Granulocytes (abs) 0.04 0.00 - 0.11 K/uL    NRBC (Absolute) 0.00 K/uL   COMP METABOLIC PANEL   Result Value Ref Range    Sodium 133 (L) 135 - 145 mmol/L    Potassium 4.4 3.6 - 5.5 mmol/L    Chloride 103 96 - 112 mmol/L    Co2 23 20 - 33 mmol/L    Anion Gap 7.0 0.0 - 11.9    Glucose 114 (H) 65 - 99  mg/dL    Bun 25 (H) 8 - 22 mg/dL    Creatinine 1.10 0.50 - 1.40 mg/dL    Calcium 9.5 8.5 - 10.5 mg/dL    AST(SGOT) 22 12 - 45 U/L    ALT(SGPT) 31 2 - 50 U/L    Alkaline Phosphatase 50 30 - 99 U/L    Total Bilirubin 0.8 0.1 - 1.5 mg/dL    Albumin 3.6 3.2 - 4.9 g/dL    Total Protein 6.5 6.0 - 8.2 g/dL    Globulin 2.9 1.9 - 3.5 g/dL    A-G Ratio 1.2 g/dL   TROPONIN   Result Value Ref Range    Troponin I 0.06 (H) 0.00 - 0.04 ng/mL   PRTOTHROMBIN TIME (INR)   Result Value Ref Range    PT 13.4 12.0 - 14.6 sec    INR 0.99 0.87 - 1.13   APTT   Result Value Ref Range    APTT 27.0 24.7 - 36.0 sec   BTYPE NATRIURETIC PEPTIDE   Result Value Ref Range    B Natriuretic Peptide 398 (H) 0 - 100 pg/mL   URINALYSIS,CULTURE IF INDICATED   Result Value Ref Range    Micro Urine Req Microscopic     Color Yellow     Character Clear     Specific Gravity 1.011 <1.035    Ph 6.0 5.0-8.0    Glucose Negative Negative mg/dL    Ketones Negative Negative mg/dL    Protein Negative Negative mg/dL    Bilirubin Negative Negative    Nitrite Negative Negative    Leukocyte Esterase Small (A) Negative    Occult Blood Negative Negative    Culture Indicated Yes UA Culture   DIAGNOSTIC ALCOHOL   Result Value Ref Range    Diagnostic Alcohol 0.00 0.00 g/dL   MAGNESIUM   Result Value Ref Range    Magnesium 1.7 1.5 - 2.5 mg/dL   PHOSPHORUS   Result Value Ref Range    Phosphorus 3.2 2.5 - 4.5 mg/dL   ESTIMATED GFR   Result Value Ref Range    GFR If African American >60 >60 mL/min/1.73 m 2    GFR If Non African American >60 >60 mL/min/1.73 m 2   URINE MICROSCOPIC (W/UA)   Result Value Ref Range    WBC 2-5 (A) /hpf    RBC Rare /hpf    Epithelial Cells Few /hpf   EKG (NOW)   Result Value Ref Range    Report       [facility] Emergency Dept.    Test Date:  2017  Pt Name:    DEMARIO AGUIRRE                    Department: ER  MRN:        [id number]                      Room:  Gender:     M                            Technician: [id number]  :         1960                   Requested By:ER TRIAGE PROTOCOL  Order #:    [id number]                    Reading MD:    Measurements  Intervals                                Axis  Rate:       70                           P:          30  CT:         180                          QRS:        -55  QRSD:       116                          T:          110  QT:         424  QTc:        458    Interpretive Statements  SINUS RHYTHM  VENTRICULAR BIGEMINY  PROBABLE LEFT ATRIAL ABNORMALITY  LVH WITH IVCD, LAD AND SECONDARY REPOL ABNRM  No previous ECG available for comparison         All labs reviewed by me.    EKG  12 lead EKG; Interpreted by emergency department physician  Rhythm: Normal Sinus Rhythm  Ventricular Bigeminy   Rate: 70  Axis: leftward axis  Conduction: Left ventricular hypertrophy pattern, nonspecific Intraventricular conduction delay  R Wave: Normal R wave progression  Normal ST-T segments  ST Segments: No ST segment elevation   T waves: Normal  Q waves: None  Clinical Impression: No acute changes since 11/24/14    RADIOLOGY  CT-HEAD W/O   Final Result      1.  There is no acute intracranial process.      DX-CHEST-PORTABLE (1 VIEW)   Final Result      1.  Enlarged cardiac silhouette with question of very mild central edema.      ECHOCARDIOGRAM COMP W/O CONT    (Results Pending)   The radiologist's interpretation of all radiological studies have been reviewed by me.    COURSE & MEDICAL DECISION MAKING  Nursing notes, VS, PMSFHx reviewed in chart.    9:28 AM - Patient seen and examined at bedside. Ordered Ct head, DX chest, Urine microscope, Diagnostic alcohol, Magnesium, CBC with diff, CMP, Troponin, PTT, APTT, BNP, Urinalysis, estimated GFR, urine culture, EKG to evaluate his symptoms. The differential diagnoses include but are not limited to: Arrhythmia, Anemia, thyroid problems, electrolyte deficiency, kidney issues.    11:10 AM Patient reevaluated at bedside. Patient is resting. Discussed the plan of care  with the patient, advising him to stop drinking. He understands and agrees to be admitted.     11:11 AM I discussed the patient's case and the above findings with Dr. Williamson ([facility]) who agrees to admit the patient.       DISPOSITION:  Patient will be admitted to Dr. Williamson in guarded condition.      FINAL IMPRESSION  1. Near syncope    2. Cardiac arrhythmia, unspecified cardiac arrhythmia type          I, Rashmi Barcenas (Scribkellie), am scribing for, and in the presence of, Ernestine Sandoval M.D..    Electronically signed by: Rashmi Barcenas (Scribe), 2/25/2017    IErnestine M.D. personally performed the services described in this documentation, as scribed by Rashmi Barcenas in my presence, and it is both accurate and complete.    The note accurately reflects work and decisions made by me.  Ernestine Sandoval  2/25/2017  2:24 PM

## 2017-02-25 NOTE — ED NOTES
Pt and family water given water per request. Updated on poc->admit. Acknowledged. No other needs at this time. Call light within reach.

## 2017-02-25 NOTE — ED NOTES
C/o palpitation this am and was diaphoretic, and states that he's had 3 episodes of palpitation. Denies any chest pain/sob. States felt light headed but denies any syncopal event. Arrived AAOX4. Skin pwd. Pt has hx of irregular heart rhythm states that he's been in bigeminy for a while.

## 2017-02-25 NOTE — IP AVS SNAPSHOT
Micromidas Access Code: [email or web address]  Expires: 3/7/2017  9:58 AM    Micromidas  A secure, online tool to manage your health information     [employer]’s Micromidas® is a secure, online tool that connects you to your personalized health information from the privacy of your home -- day or night - making it very easy for you to manage your healthcare. Once the activation process is completed, you can even access your medical information using the Micromidas katiana, which is available for free in the Apple Katiana store or Google Play store.     Micromidas provides the following levels of access (as shown below):   My Chart Features   [facility] Primary Care Doctor [facility]  Specialists [facility]  Urgent  Care Non-[facility]  Primary Care  Doctor   Email your healthcare team securely and privately 24/7 X X X X   Manage appointments: schedule your next appointment; view details of past/upcoming appointments X      Request prescription refills. X      View recent personal medical records, including lab and immunizations X X X X   View health record, including health history, allergies, medications X X X X   Read reports about your outpatient visits, procedures, consult and ER notes X X X X   See your discharge summary, which is a recap of your hospital and/or ER visit that includes your diagnosis, lab results, and care plan. X X       How to register for Micromidas:  1. Go to  [email or web address].  2. Click on the Sign Up Now box, which takes you to the New Member Sign Up page. You will need to provide the following information:  a. Enter your Micromidas Access Code exactly as it appears at the top of this page. (You will not need to use this code after you’ve completed the sign-up process. If you do not sign up before the expiration date, you must request a new code.)   b. Enter your date of birth.   c. Enter your home email address.   d. Click Submit, and follow the next screen’s instructions.  3. Create a Micromidas ID. This will be your Micromidas  login ID and cannot be changed, so think of one that is secure and easy to remember.  4. Create a Talasim password. You can change your password at any time.  5. Enter your Password Reset Question and Answer. This can be used at a later time if you forget your password.   6. Enter your e-mail address. This allows you to receive e-mail notifications when new information is available in Talasim.  7. Click Sign Up. You can now view your health information.    For assistance activating your Talasim account, call [phone]

## 2017-02-25 NOTE — ED NOTES
Chief Complaint   Patient presents with   • Vertigo     Reports got up this AM, felt like something  was wrong.  Felt lightheaded and was incont of urine.  states same occurance X 3 this week. EKG done, showing bigeminal PVCs.  Hx.calsifying aortic valve.  has not seen cardiologist in a year. states he was suposed to f/u.  states feels OK now.   saw PCP. Placed on thyroid med recently.  HR 55, was 40 on coming to ED.

## 2017-02-25 NOTE — H&P
PRIMARY CARE PHYSICIAN:  Dr. Butler.    CHIEF COMPLAINT:  Shortness of breath.    HISTORY OF PRESENT ILLNESS:  The patient is a 56-year-old chronic alcoholic,   came to the ER with above.  Per patient, when he woke up this morning, he was   not feeling that good.  After that, he started having shortness of breath.    Patient states he always have low heart rate for his whole life.  The patient   sees Dr. Ruelas as an outpatient and the last time he saw him was about more   than 8 years ago.  He drinks about 1 pint of tequila a day.  In the ER, he has   a CT scan of the head done and it was negative.  He was found to have   elevated BNP of 400.  Apart from that, the patient denied any fever, chills,   chest pain, palpitation, abdominal pain or any neurological deficit.    REVIEW OF SYSTEMS:  All other systems were reviewed and negative.  The rest   per HPI.    ALLERGIES:  No known drug allergies.    PAST MEDICAL HISTORY:  Essential hypertension, hypothyroid, and dyslipidemia.    PAST SURGICAL HISTORY:  No pertinent past surgical history.    SOCIAL HISTORY:  Drinks 1 pint of tequila a day.  Denies smoking or illicit   drug abuse.    FAMILY HISTORY:  Mother has diabetes mellitus.    OUTPATIENT MEDICATIONS:  1.  Levothyroxine 25 mcg daily.  2.  Atorvastatin 20 mg everyday.  3.  Hydrochlorothiazide 25 mg everyday.  4.  Aspirin 81 mg everyday.  5.  Meloxicam 15 mg everyday.    PHYSICAL EXAMINATION:  VITAL SIGNS:  Temperature is 97.7, pulse rate 54, respiratory rate 16, blood   pressure 140/69, and satting 93% on room air.  GENERAL:  Alert, communicative.  HEENT:  Normocephalic, atraumatic.  NECK:  Supple.  No neck gland enlargement.  CARDIOVASCULAR:  Normal first and second sound, bradycardia.  RESPIRATORY:  Normal respiratory effort.  No wheezing.  ABDOMEN:  Soft, bowel sounds are present.  Nontender.  CENTRAL NERVOUS SYSTEM:  Cranial nerves II-XII are intact.  No neurological   deficit.  EXTREMITIES:  No edema, clubbing,  or cyanosis.  PSYCHIATRIC:  Normal affect and mood.  Alert and oriented x4.  SKIN:  Warm and moist.    LABORATORY DATA:  CBC within normal limits.  Chem panel:  Sodium 133,   potassium 4.4, glucose 114, BUN 25, and creatinine 1.  Troponin is 0.06.  BNP   of 400.    ASSESSMENT AND PLAN:  1.  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  Patient   will be treated with intravenous Lasix 40 mg b.i.d. and also started on   lisinopril 5 mg everyday, holding beta-blocker due to acute symptoms.  We will   put the patient on 2 g sodium diet, strict inputs and outputs, and daily   weights.  Echocardiogram has been ordered.  2.  Chronic alcoholic.  Last drink was yesterday.  Patient will be put on CIWA   protocol.  Aspiration precaution, seizure precaution, fall precaution will be   implemented.  Patient will be provided with multivitamins, thiamine, and   folic acid.  3.  Hypothyroidism.  Continue levothyroxine.  4.  Essential hypertension, stable.  5.  Elevated troponin, likely demand ischemia.  Patient has abnormal stress   test since 2014.  The patient sees Dr. Ruelas as an outpatient.  Last time he   saw him was in 2015.  Echocardiogram has been ordered for his history of   aortic stenosis.  If it comes back abnormal, consider consulting Dr. Ruelas.  6.  Deep vein thrombosis prophylaxis, heparin.  7.  Patient is a FULL CODE.       ____________________________________     MD SHAHIDA BOWEN / STARR    DD:  02/25/2017 12:02:59  DT:  02/25/2017 12:42:12    D#:  [id number]  Job#:  [id number]

## 2017-02-25 NOTE — ED NOTES
Pt up ambulated to the restroom w/steady gait. Denies any dizziness. Urine collected appears clear and yellowish, specimen sent to lab.

## 2017-02-25 NOTE — PROGRESS NOTES
Received report from ED RN. Assumed care of patient. Pt assessed and stable. VSS. Patient reports 0/10 pain at this time. CIWA assessment 0.  Discussed plan of care for day with patient and patient's spouse and received verbal understanding. Call light within reach, bed in low position.  Educated pt re fall precautions and received verbal understanding.  However, pt continues to refuse bed alarms.  Pt is alert, oriented, steady on feet and calls appropriately.

## 2017-02-25 NOTE — IP AVS SNAPSHOT
2/26/2017          Gagandeep Castillo [place] [address]    Dear Gagandeep:    [facility] wants to ensure your discharge home is safe and you or your loved ones have had all your questions answered regarding your care after you leave the hospital.    You may receive a telephone call within two days of your discharge.  This call is to make certain you understand your discharge instructions as well as ensure we provided you with the best care possible during your stay with us.     The call will only last approximately 3-5 minutes and will be done by a nurse.    Once again, we want to ensure your discharge home is safe and that you have a clear understanding of any next steps in your care.  If you have any questions or concerns, please do not hesitate to contact us, we are here for you.  Thank you for choosing [facility] for your healthcare needs.    Sincerely,    Eros Mccormick    [facility]

## 2017-02-25 NOTE — IP AVS SNAPSHOT
" Home Care Instructions                                                                                                                  Name:Gagandeep Durbin Jr.  Medical Record Number:[id number]  CSN: [id number]    YOB: 1960   Age: 56 y.o.  Sex: male  HT:1.859 m (6' 1.2\") WT: 124.2 kg (273 lb 13 oz)          Admit Date: 2/25/2017     Discharge Date:   Today's Date: 2/26/2017  Attending Doctor:  James Arias M.D.                  Allergies:  Review of patient's allergies indicates no known allergies.            Discharge Instructions       Discharge Instructions    Discharged to home by car with relative. Discharged via wheelchair, hospital escort: Yes.  Special equipment needed: Not Applicable    Be sure to schedule a follow-up appointment with your primary care doctor or any specialists as instructed.     Discharge Plan:   Smoking Cessation Offered: Patient Refused  Influenza Vaccine Indication: Not indicated: Previously immunized this influenza season and > 8 years of age    I understand that a diet low in cholesterol, fat, and sodium is recommended for good health. Unless I have been given specific instructions below for another diet, I accept this instruction as my diet prescription.   Other diet: cardiac    Special Instructions: None    · Is patient discharged on Warfarin / Coumadin?   No     · Is patient Post Blood Transfusion?  No  Near-Syncope  Near-syncope (commonly known as near fainting) is sudden weakness, dizziness, or feeling like you might pass out. This can happen when getting up or while standing for a long time. It is caused by a sudden decrease in blood flow to the brain, which can occur for various reasons. Most of the reasons are not serious.   HOME CARE  Watch your condition for any changes.  · Have someone stay with you until you feel stable.  · If you feel like you are going to pass out:  · Lie down right away.  · Prop your feet up if you can.  · Breathe deeply and steadily.  · Move " only when the feeling has gone away. Most of the time, this feeling lasts only a few minutes. You may feel tired for several hours.  · Drink enough fluids to keep your pee (urine) clear or pale yellow.  · If you are taking blood pressure or heart medicine, stand up slowly.  · Follow up with your doctor as told.  GET HELP RIGHT AWAY IF:   · You have a severe headache.  · You have unusual pain in the chest, belly (abdomen), or back.  · You have bleeding from the mouth or butt (rectum), or you have black or tarry poop (stool).  · You feel your heart beat differently than normal, or you have a very fast pulse.  · You pass out, or you twitch and shake when you pass out.  · You pass out when sitting or lying down.  · You feel confused.  · You have trouble walking.  · You are weak.  · You have vision problems.  MAKE SURE YOU:   · Understand these instructions.  · Will watch your condition.  · Will get help right away if you are not doing well or get worse.     This information is not intended to replace advice given to you by your health care provider. Make sure you discuss any questions you have with your health care provider.     Document Released: 06/05/2009 Document Revised: 01/08/2016 Document Reviewed: 05/23/2014  [employer] Interactive Patient Education ©2016 [employer]  Orthostatic Hypotension  Orthostatic hypotension is a sudden drop in blood pressure. It happens when you quickly stand up from a seated or lying position. You may feel dizzy or light-headed. This can last for just a few seconds or for up to a few minutes. It is usually not a serious problem. However, if this happens frequently or gets worse, it can be a sign of something more serious.  CAUSES   Different things can cause orthostatic hypotension, including:   · Loss of body fluids (dehydration).  · Medicines that lower blood pressure.  · Sudden changes in posture, such as standing up quickly after you have been sitting or lying down.  · Taking too  much of your medicine.  SIGNS AND SYMPTOMS   · Light-headedness or dizziness.    · Fainting or near-fainting.    · A fast heart rate.    · Weakness.    · Feeling tired (fatigue).    DIAGNOSIS   Your health care provider may do several things to help diagnose your condition and identify the cause. These may include:   · Taking a medical history and doing a physical exam.  · Checking your blood pressure. Your health care provider will check your blood pressure when you are:  ¨ Lying down.  ¨ Sitting.  ¨ Standing.  · Using tilt table testing. In this test, you lie down on a table that moves from a lying position to a standing position. You will be strapped onto the table. This test monitors your blood pressure and heart rate when you are in different positions.  TREATMENT   Treatment will vary depending on the cause. Possible treatments include:   · Changing the dosage of your medicines.   · Wearing compression stockings on your lower legs.  · Standing up slowly after sitting or lying down.  · Eating more salt.  · Eating frequent, small meals.  · In some cases, getting IV fluids.  · Taking medicine to enhance fluid retention.  HOME CARE INSTRUCTIONS  · Only take over-the-counter or prescription medicines as directed by your health care provider.  ¨ Follow your health care provider's instructions for changing the dosage of your current medicines.  ¨ Do not stop or adjust your medicine on your own.  · Stand up slowly after sitting or lying down. This allows your body to adjust to the different position.  · Wear compression stockings as directed.  · Eat extra salt as directed.  ¨ Do not add extra salt to your diet unless directed to by your health care provider.  · Eat frequent, small meals.  · Avoid standing suddenly after eating.  · Avoid hot showers or excessive heat as directed by your health care provider.  · Keep all follow-up appointments.  SEEK MEDICAL CARE IF:  · You continue to feel dizzy or light-headed after  standing.  · You feel groggy or confused.  · You feel cold, clammy, or sick to your stomach (nauseous).  · You have blurred vision.  · You feel short of breath.  SEEK IMMEDIATE MEDICAL CARE IF:   · You faint after standing.  · You have chest pain.   · You have difficulty breathing.    · You lose feeling or movement in your arms or legs.    · You have slurred speech or difficulty talking, or you are unable to talk.    MAKE SURE YOU:   · Understand these instructions.  · Will watch your condition.  · Will get help right away if you are not doing well or get worse.     This information is not intended to replace advice given to you by your health care provider. Make sure you discuss any questions you have with your health care provider.     Document Released: 12/08/2003 Document Revised: 12/23/2014 Document Reviewed: 10/10/2014  [employer] Interactive Patient Education ©2016 [employer]      Depression / Suicide Risk    As you are discharged from this [facility] facility, it is important to learn how to keep safe from harming yourself.    Recognize the warning signs:  · Abrupt changes in personality, positive or negative- including increase in energy   · Giving away possessions  · Change in eating patterns- significant weight changes-  positive or negative  · Change in sleeping patterns- unable to sleep or sleeping all the time   · Unwillingness or inability to communicate  · Depression  · Unusual sadness, discouragement and loneliness  · Talk of wanting to die  · Neglect of personal appearance   · Rebelliousness- reckless behavior  · Withdrawal from people/activities they love  · Confusion- inability to concentrate     If you or a loved one observes any of these behaviors or has concerns about self-harm, here's what you can do:  · Talk about it- your feelings and reasons for harming yourself  · Remove any means that you might use to hurt yourself (examples: pills, rope, extension cords, firearm)  · Get professional  help from the community (Mental Health, Substance Abuse, psychological counseling)  · Do not be alone:Call your Safe Contact- someone whom you trust who will be there for you.  · Call your local CRISIS HOTLINE 296-8986 or 537-500-8805  · Call your local Children's Mobile Crisis Response Team [place] (098) 423-2068 or www.[employer]  · Call the toll free National Suicide Prevention Hotlines   · National Suicide Prevention Lifeline 834-570-MNYI (4299)  · National Hope Line Network 800-SUICIDE (687-1374)        Your appointments     Apr 28, 2017  8:00 AM   Adult Draw/Collection with [facility] (--)    [address] [place] [address] [phone]            Apr 28, 2017  9:20 AM   FOLLOW UP with Steve Ruelas M.D.   [facility] (--)    [address], [facility] [address]  [facility] [address] [phone]            May 05, 2017  8:00 AM   Established Patient with FELIX Fournier   [facility]    [address] [address] [phone]           You will be receiving a confirmation call a few days before your appointment from our automated call confirmation system.              Follow-up Information     1. Follow up with FELIX Fournier.    Specialty:  Family Medicine    Contact information    [address] [place] [address] [phone]           Discharge Medication Instructions:    Below are the medications your physician expects you to take upon discharge:    Review all your home medications and newly ordered medications with your doctor and/or pharmacist. Follow medication instructions as directed by your doctor and/or pharmacist.    Please keep your medication list with you and share with your physician.               Medication List      CONTINUE taking these medications        Instructions    aspirin 81 MG tablet   Next Dose Due:  2/27/2017     Notes to Patient:   Tomorrow morning     Take 81 mg by mouth every day.   Dose:  81 mg       Aug Betamethasone Dipropionate 0.05 % Crea   Commonly known as:  DIPROLENE-AF   Next Dose Due:  2/26/2017      Apply 1 g to affected area(s) 2 times a day.   Dose:  1 g       Garlic 1000 MG Caps   Next Dose Due:  2/26/2017     Notes to Patient:  today    Take 1 Cap by mouth every day.   Dose:  1 Cap       levothyroxine 25 MCG Tabs   Last time this was given:  25 mcg on 2/26/2017  5:33 AM   Commonly known as:  SYNTHROID   Next Dose Due:  2/27/2017     Notes to Patient:  Tomorrow morning     Take 1 Tab by mouth Every morning on an empty stomach.   Dose:  25 mcg       pravastatin 20 MG Tabs   Commonly known as:  PRAVACHOL   Next Dose Due:  2/26/2017   Notes to Patient:  Today     TAKE TWO TABLETS BY MOUTH ONCE DAILY       VITAMIN D-3 PO   Next Dose Due:  2/26/2017   Notes to Patient:  Today     Take 2,000 mg by mouth every day.   Dose:  2000 mg         STOP taking these medications     hydrochlorothiazide 25 MG Tabs   Commonly known as:  HYDRODIURIL               Instructions           Diet / Nutrition:    Follow any diet instructions given to you by your doctor or the dietician, including how much salt (sodium) you are allowed each day.    If you are overweight, talk to your doctor about a weight reduction plan.    Activity:    Remain physically active following your doctor's instructions about exercise and activity.    Rest often.     Any time you become even a little tired or short of breath, SIT DOWN and rest.    Worsening Symptoms:    Report any of the following signs and symptoms to the doctor's office immediately:    *Pain of jaw, arm, or neck  *Chest pain not relieved by medication                               *Dizziness or loss of consciousness  *Difficulty breathing even when at rest   *More tired than usual                                       *Bleeding drainage or swelling of surgical site  *Swelling of feet, ankles, legs or stomach                  *Fever (>100ºF)  *Pink or blood tinged sputum  *Weight gain (3lbs/day or 5lbs /week)           *Shock from internal defibrillator (if applicable)  *Palpitations or irregular heartbeats                *Cool and/or numb extremities    Stroke Awareness    Common Risk Factors for Stroke include:    Age  Atrial Fibrillation  Carotid Artery Stenosis  Diabetes Mellitus  Excessive alcohol consumption  High blood pressure  Overweight   Physical inactivity  Smoking    Warning signs and symptoms of a stroke include:    *Sudden numbness or weakness of the face, arm or leg (especially on one side of the body).  *Sudden confusion, trouble speaking or understanding.  *Sudden trouble seeing in one or both eyes.  *Sudden trouble walking, dizziness, loss of balance or coordination.Sudden severe headache with no known cause.    It is very important to get treatment quickly when a stroke occurs. If you experience any of the above warning signs, call 911 immediately.                   Disclaimer         Quit Smoking / Tobacco Use:    I understand the use of any tobacco products increases my chance of suffering from future heart disease or stroke and could cause other illnesses which may shorten my life. Quitting the use of tobacco products is the single most important thing I can do to improve my health. For further information on smoking / tobacco cessation call a Toll Free Quit Line at 1-966.453.7812 (*National [employer] [place]) or 1-176.250.6868 (American Lung Association) or you can access the web based program at www.lungusa.org.    [place] Tobacco Users Help Line:  (631) 390-1234       Toll Free: 1-594.566.6390  Quit Tobacco Program [facility] (067)569-7652    Crisis Hotline:    [place] Crisis Hotline:  3-970-DWZUMPE or 1-936.461.2249    [place] Crisis Hotline:    1-805.557.4728 or 892-398-0150    Discharge Survey:   Thank you for choosing [facility]. We hope we did everything we could to  make your hospital stay a pleasant one. You may be receiving a phone survey and we would appreciate your time and participation in answering the questions. Your input is very valuable to us in our efforts to improve our service to our patients and their families.        My signature on this form indicates that:    1. I have reviewed and understand the above information.  2. My questions regarding this information have been answered to my satisfaction.  3. I have formulated a plan with my discharge nurse to obtain my prescribed medications for home.                  Disclaimer         __________________________________                     __________       ________                       Patient Signature                                                 Date                    Time

## 2017-02-26 VITALS
HEIGHT: 73 IN | WEIGHT: 273.81 LBS | HEART RATE: 59 BPM | DIASTOLIC BLOOD PRESSURE: 64 MMHG | RESPIRATION RATE: 16 BRPM | OXYGEN SATURATION: 94 % | TEMPERATURE: 96.6 F | SYSTOLIC BLOOD PRESSURE: 93 MMHG | BODY MASS INDEX: 36.29 KG/M2

## 2017-02-26 PROBLEM — I21.4 NSTEMI (NON-ST ELEVATED MYOCARDIAL INFARCTION) (HCC): Status: ACTIVE | Noted: 2017-02-26

## 2017-02-26 PROBLEM — F10.20 ALCOHOLISM (HCC): Status: ACTIVE | Noted: 2017-02-26

## 2017-02-26 PROBLEM — E03.9 HYPOTHYROIDISM: Status: ACTIVE | Noted: 2017-02-26

## 2017-02-26 PROBLEM — R00.1 CHRONIC SINUS BRADYCARDIA: Status: ACTIVE | Noted: 2017-02-26

## 2017-02-26 PROBLEM — R06.00 DYSPNEA: Status: ACTIVE | Noted: 2017-02-26

## 2017-02-26 PROBLEM — E87.1 HYPONATREMIA: Status: ACTIVE | Noted: 2017-02-26

## 2017-02-26 PROBLEM — M19.90 ARTHRITIS: Status: ACTIVE | Noted: 2017-02-26

## 2017-02-26 LAB
ALBUMIN SERPL BCP-MCNC: 3.6 G/DL (ref 3.2–4.9)
ALBUMIN/GLOB SERPL: 1.3 G/DL
ALP SERPL-CCNC: 44 U/L (ref 30–99)
ALT SERPL-CCNC: 28 U/L (ref 2–50)
ANION GAP SERPL CALC-SCNC: 8 MMOL/L (ref 0–11.9)
AST SERPL-CCNC: 17 U/L (ref 12–45)
BILIRUB SERPL-MCNC: 0.9 MG/DL (ref 0.1–1.5)
BUN SERPL-MCNC: 25 MG/DL (ref 8–22)
CALCIUM SERPL-MCNC: 9.5 MG/DL (ref 8.5–10.5)
CHLORIDE SERPL-SCNC: 102 MMOL/L (ref 96–112)
CO2 SERPL-SCNC: 26 MMOL/L (ref 20–33)
CREAT SERPL-MCNC: 1.2 MG/DL (ref 0.5–1.4)
EKG IMPRESSION: NORMAL
ERYTHROCYTE [DISTWIDTH] IN BLOOD BY AUTOMATED COUNT: 42.4 FL (ref 35.9–50)
GFR SERPL CREATININE-BSD FRML MDRD: >60 ML/MIN/1.73 M 2
GLOBULIN SER CALC-MCNC: 2.8 G/DL (ref 1.9–3.5)
GLUCOSE SERPL-MCNC: 106 MG/DL (ref 65–99)
HCT VFR BLD AUTO: 43.7 % (ref 42–52)
HGB BLD-MCNC: 15.2 G/DL (ref 14–18)
LV EJECT FRACT  99904: 55
LV EJECT FRACT MOD 4C 99902: 42.16
MCH RBC QN AUTO: 31.9 PG (ref 27–33)
MCHC RBC AUTO-ENTMCNC: 34.8 G/DL (ref 33.7–35.3)
MCV RBC AUTO: 91.8 FL (ref 81.4–97.8)
PLATELET # BLD AUTO: 172 K/UL (ref 164–446)
PMV BLD AUTO: 10.4 FL (ref 9–12.9)
POTASSIUM SERPL-SCNC: 4.1 MMOL/L (ref 3.6–5.5)
PROT SERPL-MCNC: 6.4 G/DL (ref 6–8.2)
RBC # BLD AUTO: 4.76 M/UL (ref 4.7–6.1)
SODIUM SERPL-SCNC: 136 MMOL/L (ref 135–145)
WBC # BLD AUTO: 6 K/UL (ref 4.8–10.8)

## 2017-02-26 PROCEDURE — 93005 ELECTROCARDIOGRAM TRACING: CPT | Performed by: INTERNAL MEDICINE

## 2017-02-26 PROCEDURE — 700102 HCHG RX REV CODE 250 W/ 637 OVERRIDE(OP): Performed by: INTERNAL MEDICINE

## 2017-02-26 PROCEDURE — 99239 HOSP IP/OBS DSCHRG MGMT >30: CPT | Performed by: HOSPITALIST

## 2017-02-26 PROCEDURE — 85027 COMPLETE CBC AUTOMATED: CPT

## 2017-02-26 PROCEDURE — 93010 ELECTROCARDIOGRAM REPORT: CPT | Performed by: INTERNAL MEDICINE

## 2017-02-26 PROCEDURE — 93306 TTE W/DOPPLER COMPLETE: CPT | Mod: 26 | Performed by: INTERNAL MEDICINE

## 2017-02-26 PROCEDURE — 93306 TTE W/DOPPLER COMPLETE: CPT

## 2017-02-26 PROCEDURE — 36415 COLL VENOUS BLD VENIPUNCTURE: CPT

## 2017-02-26 PROCEDURE — 700111 HCHG RX REV CODE 636 W/ 250 OVERRIDE (IP): Performed by: INTERNAL MEDICINE

## 2017-02-26 PROCEDURE — 80053 COMPREHEN METABOLIC PANEL: CPT

## 2017-02-26 PROCEDURE — A9270 NON-COVERED ITEM OR SERVICE: HCPCS | Performed by: INTERNAL MEDICINE

## 2017-02-26 RX ADMIN — LISINOPRIL 5 MG: 5 TABLET ORAL at 08:23

## 2017-02-26 RX ADMIN — ASPIRIN 81 MG: 81 TABLET ORAL at 08:23

## 2017-02-26 RX ADMIN — THERA TABS 1 TABLET: TAB at 08:23

## 2017-02-26 RX ADMIN — HEPARIN SODIUM 5000 UNITS: 5000 INJECTION, SOLUTION INTRAVENOUS; SUBCUTANEOUS at 05:33

## 2017-02-26 RX ADMIN — Medication 100 MG: at 08:23

## 2017-02-26 RX ADMIN — FUROSEMIDE 40 MG: 10 INJECTION, SOLUTION INTRAVENOUS at 05:33

## 2017-02-26 RX ADMIN — FOLIC ACID 1 MG: 1 TABLET ORAL at 08:23

## 2017-02-26 RX ADMIN — POTASSIUM CHLORIDE 40 MEQ: 1500 TABLET, EXTENDED RELEASE ORAL at 08:24

## 2017-02-26 RX ADMIN — LEVOTHYROXINE SODIUM 25 MCG: 25 TABLET ORAL at 05:33

## 2017-02-26 ASSESSMENT — LIFESTYLE VARIABLES
ORIENTATION AND CLOUDING OF SENSORIUM: ORIENTED AND CAN DO SERIAL ADDITIONS
TREMOR: NO TREMOR
TREMOR: NO TREMOR
AGITATION: NORMAL ACTIVITY
ORIENTATION AND CLOUDING OF SENSORIUM: ORIENTED AND CAN DO SERIAL ADDITIONS
NAUSEA AND VOMITING: NO NAUSEA AND NO VOMITING
VISUAL DISTURBANCES: NOT PRESENT
TREMOR: NO TREMOR
ANXIETY: NO ANXIETY (AT EASE)
NAUSEA AND VOMITING: NO NAUSEA AND NO VOMITING
AGITATION: NORMAL ACTIVITY
AUDITORY DISTURBANCES: NOT PRESENT
VISUAL DISTURBANCES: NOT PRESENT
AUDITORY DISTURBANCES: NOT PRESENT
TOTAL SCORE: 0
PAROXYSMAL SWEATS: NO SWEAT VISIBLE
HEADACHE, FULLNESS IN HEAD: NOT PRESENT
HEADACHE, FULLNESS IN HEAD: NOT PRESENT
PAROXYSMAL SWEATS: NO SWEAT VISIBLE
ORIENTATION AND CLOUDING OF SENSORIUM: ORIENTED AND CAN DO SERIAL ADDITIONS
TOTAL SCORE: 0
NAUSEA AND VOMITING: NO NAUSEA AND NO VOMITING
HEADACHE, FULLNESS IN HEAD: NOT PRESENT
TOTAL SCORE: 0
AUDITORY DISTURBANCES: NOT PRESENT
ORIENTATION AND CLOUDING OF SENSORIUM: ORIENTED AND CAN DO SERIAL ADDITIONS
NAUSEA AND VOMITING: NO NAUSEA AND NO VOMITING
EVER_SMOKED: YES
PAROXYSMAL SWEATS: NO SWEAT VISIBLE
ANXIETY: NO ANXIETY (AT EASE)
ANXIETY: NO ANXIETY (AT EASE)
HEADACHE, FULLNESS IN HEAD: NOT PRESENT
TOTAL SCORE: 0
ANXIETY: NO ANXIETY (AT EASE)
AGITATION: NORMAL ACTIVITY
AGITATION: NORMAL ACTIVITY
VISUAL DISTURBANCES: NOT PRESENT
AUDITORY DISTURBANCES: NOT PRESENT
TREMOR: NO TREMOR
PAROXYSMAL SWEATS: NO SWEAT VISIBLE
VISUAL DISTURBANCES: NOT PRESENT

## 2017-02-26 ASSESSMENT — PAIN SCALES - GENERAL
PAINLEVEL_OUTOF10: 0

## 2017-02-26 NOTE — PROGRESS NOTES
Monitor Summar:  SR 73-83; frequent PVCs, bigem, trigem, couplets, Frequent Bigem, Triplet; .20/.08/.36

## 2017-02-26 NOTE — PROGRESS NOTES
Bedside report received. Patient A&O x 4. VS'S. RA. No overnight events. No complaints of pain or SOB at this time. CIWA scores have been consistently ZERO. Patient states last drink was Thursday 2/23 and is not sure where they got the 1 pint a day from and that he drinks much less?  POC discussed with patient. Pt verbalizes understanding. Call light and belongings with in reach. Bed locked and in lowest position, alarm and fall precautions in place.

## 2017-02-26 NOTE — PROGRESS NOTES
Pt. being discharged. Pt. educated on discharge instructions and prescriptions. Pt verbalizes understanding. Patient to follow up with [facility] [place] Patient provided with letter for work . PIV removed, monitor checked in. Pt. Going home with wife. RN to call transport for escort out, will monitor pt. Till off unit.

## 2017-02-26 NOTE — PROGRESS NOTES
Assumed care at 1900. Bedside report received from Kamlesh. Patient's chart and MAR reviewed. Pt denies pain at this time. Pt is A & O 4. Patient was updated on plan of care for the day. Questions answered and concerns addressed.  Pt denies any additional needs at this time. White board updated. Call light, phone and personal belongings within reach.

## 2017-02-26 NOTE — CARE PLAN
Problem: Safety  Goal: Will remain free from injury  Bed locked and in lowest position.patient up self, refuses bed alarm. Treaded socks. Call light and belongings within reach. Patient educated to call for assistance. Pt verbalized understanding. Hourly rounding in place.     Problem: Knowledge Deficit  Goal: Knowledge of disease process/condition, treatment plan, diagnostic tests, and medications will improve  Discussed POC and medications with patient, pt. verbalized understanding.

## 2017-02-26 NOTE — CARE PLAN
Problem: Communication  Goal: The ability to communicate needs accurately and effectively will improve  Pt able to communicate verbally in English    Problem: Safety  Goal: Will remain free from injury  Fall precautions in place.  Treaded socks in place.  Educated pt on fall precautions and received verbal understanding.  However, pt continues to refuse bed alarm.    Problem: Infection  Goal: Will remain free from infection  No s/s of infection at this time.    Problem: Venous Thromboembolism (VTW)/Deep Vein Thrombosis (DVT) Prevention:  Goal: Patient will participate in Venous Thrombosis (VTE)/Deep Vein Thrombosis (DVT)Prevention Measures  SCDs ordered and applied to bilateral lower extremities.  Heparin subq ordered every 8 hours.    Problem: Bowel/Gastric:  Goal: Normal bowel function is maintained or improved  Pt reports normal bowel function at this time.

## 2017-02-26 NOTE — DISCHARGE INSTRUCTIONS
Discharge Instructions    Discharged to home by car with relative. Discharged via wheelchair, hospital escort: Yes.  Special equipment needed: Not Applicable    Be sure to schedule a follow-up appointment with your primary care doctor or any specialists as instructed.     Discharge Plan:   Smoking Cessation Offered: Patient Refused  Influenza Vaccine Indication: Not indicated: Previously immunized this influenza season and > 8 years of age    I understand that a diet low in cholesterol, fat, and sodium is recommended for good health. Unless I have been given specific instructions below for another diet, I accept this instruction as my diet prescription.   Other diet: cardiac    Special Instructions: None    · Is patient discharged on Warfarin / Coumadin?   No     · Is patient Post Blood Transfusion?  No  Near-Syncope  Near-syncope (commonly known as near fainting) is sudden weakness, dizziness, or feeling like you might pass out. This can happen when getting up or while standing for a long time. It is caused by a sudden decrease in blood flow to the brain, which can occur for various reasons. Most of the reasons are not serious.   HOME CARE  Watch your condition for any changes.  · Have someone stay with you until you feel stable.  · If you feel like you are going to pass out:  · Lie down right away.  · Prop your feet up if you can.  · Breathe deeply and steadily.  · Move only when the feeling has gone away. Most of the time, this feeling lasts only a few minutes. You may feel tired for several hours.  · Drink enough fluids to keep your pee (urine) clear or pale yellow.  · If you are taking blood pressure or heart medicine, stand up slowly.  · Follow up with your doctor as told.  GET HELP RIGHT AWAY IF:   · You have a severe headache.  · You have unusual pain in the chest, belly (abdomen), or back.  · You have bleeding from the mouth or butt (rectum), or you have black or tarry poop (stool).  · You feel your heart  beat differently than normal, or you have a very fast pulse.  · You pass out, or you twitch and shake when you pass out.  · You pass out when sitting or lying down.  · You feel confused.  · You have trouble walking.  · You are weak.  · You have vision problems.  MAKE SURE YOU:   · Understand these instructions.  · Will watch your condition.  · Will get help right away if you are not doing well or get worse.     This information is not intended to replace advice given to you by your health care provider. Make sure you discuss any questions you have with your health care provider.     Document Released: 06/05/2009 Document Revised: 01/08/2016 Document Reviewed: 05/23/2014  [employer] Interactive Patient Education ©2016 [employer]  Orthostatic Hypotension  Orthostatic hypotension is a sudden drop in blood pressure. It happens when you quickly stand up from a seated or lying position. You may feel dizzy or light-headed. This can last for just a few seconds or for up to a few minutes. It is usually not a serious problem. However, if this happens frequently or gets worse, it can be a sign of something more serious.  CAUSES   Different things can cause orthostatic hypotension, including:   · Loss of body fluids (dehydration).  · Medicines that lower blood pressure.  · Sudden changes in posture, such as standing up quickly after you have been sitting or lying down.  · Taking too much of your medicine.  SIGNS AND SYMPTOMS   · Light-headedness or dizziness.    · Fainting or near-fainting.    · A fast heart rate.    · Weakness.    · Feeling tired (fatigue).    DIAGNOSIS   Your health care provider may do several things to help diagnose your condition and identify the cause. These may include:   · Taking a medical history and doing a physical exam.  · Checking your blood pressure. Your health care provider will check your blood pressure when you are:  ¨ Lying down.  ¨ Sitting.  ¨ Standing.  · Using tilt table testing. In this  test, you lie down on a table that moves from a lying position to a standing position. You will be strapped onto the table. This test monitors your blood pressure and heart rate when you are in different positions.  TREATMENT   Treatment will vary depending on the cause. Possible treatments include:   · Changing the dosage of your medicines.   · Wearing compression stockings on your lower legs.  · Standing up slowly after sitting or lying down.  · Eating more salt.  · Eating frequent, small meals.  · In some cases, getting IV fluids.  · Taking medicine to enhance fluid retention.  HOME CARE INSTRUCTIONS  · Only take over-the-counter or prescription medicines as directed by your health care provider.  ¨ Follow your health care provider's instructions for changing the dosage of your current medicines.  ¨ Do not stop or adjust your medicine on your own.  · Stand up slowly after sitting or lying down. This allows your body to adjust to the different position.  · Wear compression stockings as directed.  · Eat extra salt as directed.  ¨ Do not add extra salt to your diet unless directed to by your health care provider.  · Eat frequent, small meals.  · Avoid standing suddenly after eating.  · Avoid hot showers or excessive heat as directed by your health care provider.  · Keep all follow-up appointments.  SEEK MEDICAL CARE IF:  · You continue to feel dizzy or light-headed after standing.  · You feel groggy or confused.  · You feel cold, clammy, or sick to your stomach (nauseous).  · You have blurred vision.  · You feel short of breath.  SEEK IMMEDIATE MEDICAL CARE IF:   · You faint after standing.  · You have chest pain.   · You have difficulty breathing.    · You lose feeling or movement in your arms or legs.    · You have slurred speech or difficulty talking, or you are unable to talk.    MAKE SURE YOU:   · Understand these instructions.  · Will watch your condition.  · Will get help right away if you are not doing well  or get worse.     This information is not intended to replace advice given to you by your health care provider. Make sure you discuss any questions you have with your health care provider.     Document Released: 12/08/2003 Document Revised: 12/23/2014 Document Reviewed: 10/10/2014  [employer] Interactive Patient Education ©2016 [employer]      Depression / Suicide Risk    As you are discharged from this [facility] facility, it is important to learn how to keep safe from harming yourself.    Recognize the warning signs:  · Abrupt changes in personality, positive or negative- including increase in energy   · Giving away possessions  · Change in eating patterns- significant weight changes-  positive or negative  · Change in sleeping patterns- unable to sleep or sleeping all the time   · Unwillingness or inability to communicate  · Depression  · Unusual sadness, discouragement and loneliness  · Talk of wanting to die  · Neglect of personal appearance   · Rebelliousness- reckless behavior  · Withdrawal from people/activities they love  · Confusion- inability to concentrate     If you or a loved one observes any of these behaviors or has concerns about self-harm, here's what you can do:  · Talk about it- your feelings and reasons for harming yourself  · Remove any means that you might use to hurt yourself (examples: pills, rope, extension cords, firearm)  · Get professional help from the community (Mental Health, Substance Abuse, psychological counseling)  · Do not be alone:Call your Safe Contact- someone whom you trust who will be there for you.  · Call your local CRISIS HOTLINE 060-9874 or 151-005-9816  · Call your local Children's Mobile Crisis Response Team [place] (535) 775-4015 or www.[employer]  · Call the toll free National Suicide Prevention Hotlines   · National Suicide Prevention Lifeline 432-053-GRAB (3260)  · National Hope Line Network 800-SUICIDE (097-5084)

## 2017-02-26 NOTE — CARE PLAN
Problem: Safety  Goal: Will remain free from falls  Pt educated on the importance fall prevention methods, such as treaded sock and the bed alarm. Pt stated they will use the call light prior to any attempts of ambulation. Ambulatory ability assessed, treaded socks in place, bed locked and in low position, frequent trips to bathroom offered, and call light and phone within reach.    Problem: Knowledge Deficit  Goal: Knowledge of the prescribed therapeutic regimen will improve  Pt educated regarding plan of care and medications. All questions answered.

## 2017-02-27 LAB
BACTERIA UR CULT: ABNORMAL
BACTERIA UR CULT: ABNORMAL
SIGNIFICANT IND 70042: ABNORMAL
SITE SITE: ABNORMAL
SOURCE SOURCE: ABNORMAL

## 2017-02-27 NOTE — DISCHARGE SUMMARY
PRIMARY DIAGNOSES:  1.  Orthostatic hypotension.  2.  Dizziness secondary to above.  3.  Benign hypertension.  4.  Hyperlipidemia.  5.  Hypothyroidism.  6.  Osteoarthritis.  7.  Chronic asymptomatic sinus bradycardia, possibly contributing to #1.  8.  Hyponatremia, likely due to medication.  9.  Mild non-ST elevation myocardial infarction, likely due to orthostatic   hypotension.  10.  History of mild aortic stenosis.    BRIEF HOSPITAL COURSE:  This is a pleasant 56-year-old male with a recent   history of dizziness upon standing with some associated tingling.  The patient   has a presyncopal episode and was found to be orthostatic.  His   hydrochlorothiazide was stopped, he was hydrated and he improved through his   hospital course.  Please note that there is a marked discrepancy between the   emergency room physician, the admitting hospitalist physician and my report   with regards to diagnoses.  To be clear this patient is not an alcoholic.  He   has no evidence for alcoholic heart disease or cardiomyopathy.  He has no   evidence of withdrawal and he does not drink the stated 1 pint of Tequila per   day as noted in the history and physical.  He is feeling well at the time of   discharge and will follow up with cardiology as scheduled and his primary care   provider.  He may need some Holter monitoring if he has some further   complaints and episodes in the future after stopping his hydrochlorothiazide   as he is bradycardic, although this has been going on for quite some time   without symptoms in the past.  Of concern is one episode of urinary   incontinence associated with his syncopal episodes.  He has had no evidence at   this point of any seizure activity.  He currently drives a truck for   profession and if he remains asymptomatic after stopping his   hydrochlorothiazide, I think it is reasonable to allow him to keep driving at   this time.    CONSULTATIONS:  None.    DIET:  Regular.    ACTIVITY:  As  tolerated.    MEDICATIONS ON DISCHARGE:  1.  Aspirin 81 mg a day.  2.  Pravachol 20 mg a day.  3.  _____ cream as needed.  4.  Garlic 1000 mg a day.  5.  Synthroid 25 mcg a day.  6.  Vitamin D 3000 units a day.    Time spent with this patient and his discharge today is extensive, 45 minutes.       ____________________________________     MD LOW CHURCH / STARR    DD:  02/27/2017 09:23:28  DT:  02/27/2017 10:43:17    D#:  [id number]  Job#:  [id number]

## 2017-02-28 ENCOUNTER — PATIENT OUTREACH (OUTPATIENT)
Dept: HEALTH INFORMATION MANAGEMENT | Facility: OTHER | Age: 57
End: 2017-02-28

## 2017-03-01 NOTE — PROGRESS NOTES
02/28/2017 1701 - Received call from hospital  - states patient that I had recently talked to wanted to schedule with DC clinic. I volunteered to call patient back and schedule appt. Call completed

## 2017-03-03 ENCOUNTER — OFFICE VISIT (OUTPATIENT)
Dept: MEDICAL GROUP | Facility: CLINIC | Age: 57
End: 2017-03-03
Payer: COMMERCIAL

## 2017-03-03 VITALS
OXYGEN SATURATION: 94 % | BODY MASS INDEX: 37.01 KG/M2 | WEIGHT: 282 LBS | TEMPERATURE: 98.4 F | HEART RATE: 82 BPM | SYSTOLIC BLOOD PRESSURE: 120 MMHG | DIASTOLIC BLOOD PRESSURE: 72 MMHG | RESPIRATION RATE: 20 BRPM

## 2017-03-03 DIAGNOSIS — Z09 HOSPITAL DISCHARGE FOLLOW-UP: ICD-10-CM

## 2017-03-03 DIAGNOSIS — R00.1 CHRONIC SINUS BRADYCARDIA: ICD-10-CM

## 2017-03-03 DIAGNOSIS — I10 ESSENTIAL HYPERTENSION: ICD-10-CM

## 2017-03-03 PROBLEM — R06.00 DYSPNEA: Status: RESOLVED | Noted: 2017-02-26 | Resolved: 2017-03-03

## 2017-03-03 PROBLEM — E87.1 HYPONATREMIA: Status: RESOLVED | Noted: 2017-02-26 | Resolved: 2017-03-03

## 2017-03-03 PROCEDURE — 99214 OFFICE O/P EST MOD 30 MIN: CPT | Performed by: NURSE PRACTITIONER

## 2017-03-03 ASSESSMENT — ENCOUNTER SYMPTOMS
FLANK PAIN: 0
NAUSEA: 0
SHORTNESS OF BREATH: 0
PALPITATIONS: 0
WEAKNESS: 0
WHEEZING: 0
SORE THROAT: 0
ABDOMINAL PAIN: 0
CHILLS: 0
FEVER: 0
DIZZINESS: 0
DIARRHEA: 0
COUGH: 0
VOMITING: 0
HEADACHES: 0
SPUTUM PRODUCTION: 0

## 2017-03-03 ASSESSMENT — PAIN SCALES - GENERAL: PAINLEVEL: NO PAIN

## 2017-03-03 NOTE — MR AVS SNAPSHOT
Gagandeep Sublette Jr.   3/3/2017 2:40 PM   Office Visit   MRN: [id number]    Department:  [facility]   Dept Phone:  [phone]    Description:  Male : 1960   Provider:  MERCY Qureshi           Reason for Visit     Hospital Follow-up HTN       Allergies as of 3/3/2017     No Known Allergies      You were diagnosed with     Hospital discharge follow-up   [579323]       Essential hypertension   [0138513]       Chronic sinus bradycardia   [156031]         Vital Signs     Blood Pressure Pulse Temperature Respirations Weight Oxygen Saturation    120/72 mmHg 82 36.9 °C (98.4 °F) 20 127.914 kg (282 lb) 94%    Smoking Status                   Former Smoker           Basic Information     Date Of Birth Sex Race Ethnicity Preferred Language    1960 Male White Non- English      Your appointments     2017  7:00 AM   Adult Draw/Collection with LAB [facility]   [place] [facility] (--)    [address] [place] [address] [phone]            2017  9:20 AM   FOLLOW UP with Steve Ruelas M.D.   [facility] (--)    [address] [place] [address] [address]  [place] [address] [phone]            May 05, 2017  8:00 AM   Established Patient with FELIX Fournier   [facility] [place]    [address]  [place] [address] [phone]           You will be receiving a confirmation call a few days before your appointment from our automated call confirmation system.              Problem List              ICD-10-CM Priority Class Noted - Resolved    BMI 37.0-37.9, adult Z68.37   10/14/2014 - Present    Hyperlipidemia E78.5 High  10/14/2014 - Present    Accessory skin tags Q82.8   10/15/2014 - Present    Vitamin D deficiency E55.9   10/21/2014 - Present    HTN (hypertension) I10 High  10/30/2014 - Present    Abnormal EKG R94.31   2014 - Present    Aortic stenosis I35.0 Medium  2015 - Present    Hypothyroid E03.9   12/6/2016 - Present    Need for Tdap vaccination Z23   2/10/2017 - Present    Alcoholism (CMS-HCC) F10.20   2/26/2017 - Present    Hypothyroidism E03.9   2/26/2017 - Present    Arthritis M19.90   2/26/2017 - Present    Chronic sinus bradycardia R00.1   2/26/2017 - Present    NSTEMI (non-ST elevated myocardial infarction) (CMS-HCC) I21.4   2/26/2017 - Present      Health Maintenance        Date Due Completion Dates    COLONOSCOPY 10/1/2021 10/1/2016 (N/S), 3/1/2013 (N/S)    Override on 10/1/2016: (N/S)    Override on 3/1/2013: (N/S)    IMM DTaP/Tdap/Td Vaccine (2 - Td) 2/10/2027 2/10/2017            Current Immunizations     Influenza Vaccine Quad Inj (Pf) 10/15/2016    Tdap Vaccine 2/10/2017  8:30 AM      Below and/or attached are the medications your provider expects you to take. Review all of your home medications and newly ordered medications with your provider and/or pharmacist. Follow medication instructions as directed by your provider and/or pharmacist. Please keep your medication list with you and share with your provider. Update the information when medications are discontinued, doses are changed, or new medications (including over-the-counter products) are added; and carry medication information at all times in the event of emergency situations     Allergies:  No Known Allergies          Medications  Valid as of: March 03, 2017 -  3:44 PM    Generic Name Brand Name Tablet Size Instructions for use    Aspirin (Tab) aspirin 81 MG Take 81 mg by mouth every day.        Betamethasone Dipropionate Aug (Cream) DIPROLENE-AF 0.05 % Apply 1 g to affected area(s) 2 times a day.        Cholecalciferol   Take 2,000 mg by mouth every day.        Garlic (Cap) Garlic 1000 MG Take 1 Cap by mouth every day.        Levothyroxine Sodium (Tab) SYNTHROID 25 MCG Take 1 Tab by mouth Every morning on an empty stomach.        Pravastatin Sodium (Tab) PRAVACHOL 20 MG TAKE TWO TABLETS BY MOUTH ONCE DAILY        .                  Medicines prescribed today were sent to:     [facility] [place], [place] - [address]    [address] [place] [address]    Phone: [phone] Fax: [phone]    Open 24 Hours?: No      Medication refill instructions:       If your prescription bottle indicates you have medication refills left, it is not necessary to call your provider’s office. Please contact your pharmacy and they will refill your medication.    If your prescription bottle indicates you do not have any refills left, you may request refills at any time through one of the following ways: The online YesVideo system (except Urgent Care), by calling your provider’s office, or by asking your pharmacy to contact your provider’s office with a refill request. Medication refills are processed only during regular business hours and may not be available until the next business day. Your provider may request additional information or to have a follow-up visit with you prior to refilling your medication.   *Please Note: Medication refills are assigned a new Rx number when refilled electronically. Your pharmacy may indicate that no refills were authorized even though a new prescription for the same medication is available at the pharmacy. Please request the medicine by name with the pharmacy before contacting your provider for a refill.        Instructions    If you need further assistance, or have any questions; concerns or lingering symptoms before seeing your Primary Care Provider, do not hesitate to contact us.     Please contact us at the Post-Hospital Follow Up Program at [phone].   Our offices hours are Monday-Friday 8 am-5 pm.            MyChart Status: Patient Declined

## 2017-03-03 NOTE — PROGRESS NOTES
Subjective:     Gagandeep Durbin Jr. is a 56 y.o. male who presents for Hospital Follow-up.  Chart reviewed. Discharge summary available for review: Yes   Date of discharge 2/26/2017  48- hour post discharge RN call completed on 2/28/2017 and documented in the medical record by Amanda Castro.    HPI: Recently hospitalized for dizziness/presyncope upon stanidng with associated tingling, negative head ct, found to have orthostatic hypotension. Hospitalized from 2/25 to 2/26.     2/25 HEAD CT: 1.  There is no acute intracranial process.  2/26 EKG: SINUS RHYTHM; VENTRICULAR BIGEMINY    Lab Results   Component Value Date/Time    SODIUM 136 02/26/2017 03:00 AM    POTASSIUM 4.1 02/26/2017 03:00 AM    CHLORIDE 102 02/26/2017 03:00 AM    CO2 26 02/26/2017 03:00 AM    GLUCOSE 106* 02/26/2017 03:00 AM    BUN 25* 02/26/2017 03:00 AM    CREATININE 1.20 02/26/2017 03:00 AM     Since returning home, patient reports feeling good. He is off hydrochlorothiazide and checking his BP showing BP range from 145-200/. HR 55-60. Pt reported he is not feeling any difference when BP jumps up to 200 nor dizziness/near syncope again. Denied palpitation. His blood pressure today in the clinic is 120/72. HR 82. The patient does not feel weakness; no difficulty taking care of self at home.    Upcoming appointment: 4/28 cardiologist; 5/5 PCP    Allergies:   Review of patient's allergies indicates no known allergies.    Social History:  Social History   Substance Use Topics   • Smoking status: Former Smoker     Quit date: 05/14/2013   • Smokeless tobacco: Never Used   • Alcohol Use: Yes      Comment: 1-2 a night        ROS:  Review of Systems   Constitutional: Negative for fever, chills and malaise/fatigue.   HENT: Negative for congestion and sore throat.    Respiratory: Negative for cough, sputum production, shortness of breath and wheezing.    Cardiovascular: Negative for chest pain, palpitations and leg swelling.   Gastrointestinal: Negative  for nausea, vomiting, abdominal pain and diarrhea.   Genitourinary: Negative for dysuria, urgency, frequency and flank pain.   Neurological: Negative for dizziness, weakness and headaches.        Objective:     Blood pressure 120/72, pulse 82, temperature 36.9 °C (98.4 °F), resp. rate 20, weight 127.914 kg (282 lb), SpO2 94 %.     Physical Exam:  Physical Exam   Constitutional: He is oriented to person, place, and time and well-developed, well-nourished, and in no distress.   HENT:   Head: Normocephalic and atraumatic.   Eyes: Conjunctivae are normal.   Neck: Neck supple. No JVD present.   Cardiovascular: Normal rate and regular rhythm.    No murmur heard.  Pulmonary/Chest: Effort normal and breath sounds normal. No respiratory distress.   Abdominal: Soft. Bowel sounds are normal. He exhibits no distension. There is no tenderness. There is no rebound.   Musculoskeletal: Normal range of motion. He exhibits no edema.   Neurological: He is alert and oriented to person, place, and time.   Skin: Skin is warm.   Vitals reviewed.        Assessment and Plan:     1. Hospital discharge follow-up  Hospitalization and results reviewed with patient. High risk conditions requiring teaching or care coordination were identified and addressed.The patient demonstrate understanding of admission and underlying conditions. The patient understands discharge instructions and when to seek medical attention. Medications reviewed including instructions regarding high risk medications, dosing and side effects.    The patient is able to safely adhere to ADL/IADL, treatment and medication regimen, self-manage of high-risk conditions? Yes   The patient requires physical therapy/home health/DME referral? No   The patient requires referral to care coordination/behavioral health/social work?  No   Patient requires referral for pharmacy consult? No   Advance directive/POLST on file?  No   Required counseled on advance directive?  No     - also  discussed lab, positive urine culture in the hospital. No symptoms at all, most likely contaminated. Pt will give us a call back if he has new symptoms. He is currently doing great.      2. Essential hypertension  - Questioning the accuracy of pt's BP machine. We are going to make an RN visit for pt to check his BP machine but pt lives in [place]. He would prefer to go somewhere closer to him if it is ok. Pt will call the clinic closer to him to make an RN visit to check his machine.   - off hydrochlorothiazide, no recurrent symptoms.     3. Chronic sinus bradycardia  - no symptoms.  - Cardiologist follow up on 4/28.        Medication Reconciliation  Medication list at end of encounter:   Current Outpatient Prescriptions   Medication Sig Dispense Refill   • Garlic 1000 MG Cap Take 1 Cap by mouth every day.     • Aug Betamethasone Dipropionate (DIPROLENE-AF) 0.05 % Cream Apply 1 g to affected area(s) 2 times a day.     • levothyroxine (SYNTHROID) 25 MCG Tab Take 1 Tab by mouth Every morning on an empty stomach. 90 Tab 0   • pravastatin (PRAVACHOL) 20 MG Tab TAKE TWO TABLETS BY MOUTH ONCE DAILY 90 Tab 1   • aspirin 81 MG tablet Take 81 mg by mouth every day.     • Cholecalciferol (VITAMIN D-3 PO) Take 2,000 mg by mouth every day.       No current facility-administered medications for this visit.       Primary care follow-up:  New health conditions identified during hospitalization? Yes   Labs/pathology/imaging requires future PCP follow-up?  No   Changes to medications during hospitalization or today? No     Recommended followup: No Follow-up on file. with FELIX Fournier   Future Appointments       Provider Department Center    4/28/2017 7:00 AM LAB [facility] OUT     4/28/2017 9:20 AM Steve Ruelas M.D. [facility]     5/5/2017 8:00 AM FELIX Fournier [facility]          Patient Instruction  Patient offered educational material  on discharge diagnosis and management of symptoms/red flags. Patient instructed to keep follow-up appointments and to bring written questions and and actual medications to each office visit. Patient instructed to call PCP/specialist with any problems/questions/concerns. Patient verbalizes understanding and has no further questions at this time.    Face-to-face transitional care management services with moderate complexity medical decision making.

## 2017-03-03 NOTE — PATIENT INSTRUCTIONS
If you need further assistance, or have any questions; concerns or lingering symptoms before seeing your Primary Care Provider, do not hesitate to contact us.     Please contact us at the Post-Hospital Follow Up Program at [phone].   Our offices hours are Monday-Friday 8 am-5 pm.

## 2017-03-03 NOTE — PROGRESS NOTES
POST DISCHARGE CALL MADE BY Amanda Castro.    Discharge Date:2/26/2017   Date of Outreach Call: 2/28/2017  4:19 PM  Now that you're home, how are you doing? Good  Comment:Doing good. Will check BPs and record. No  lightheadedness or dizziness.   Do you have questions about your medications? No  Comment:States he is holding his HCTZ as instructed.  Advised to keep BP log and take to PCP    Did you fill your medications? No  Comment:No new Rx.     Do you have a follow-up appointment scheduled?No  Comment:States he will call PCP first. My number given  and encouraged to call if unable to get into PCP.     Discharging Department: Telemetry 8    Number of Attempts: 3  Current or previous attempts completed within two business days of discharge? Yes  Provided education regarding treatment plan, medication, self-management, ADLs? No  Has patient completed Advance Directive? If yes, advise them to bring to appointment. No  Comment:Encouraged to complete one  Care Manager phone number provided? Yes  Comment:[phone]  Is there anything else I can help you with? No

## 2017-03-17 ENCOUNTER — RESOLUTE PROFESSIONAL BILLING HOSPITAL PROF FEE (OUTPATIENT)
Dept: HOSPITALIST | Facility: MEDICAL CENTER | Age: 57
End: 2017-03-17
Payer: COMMERCIAL

## 2017-03-17 ENCOUNTER — APPOINTMENT (OUTPATIENT)
Dept: RADIOLOGY | Facility: MEDICAL CENTER | Age: 57
DRG: 287 | End: 2017-03-17
Attending: EMERGENCY MEDICINE
Payer: COMMERCIAL

## 2017-03-17 ENCOUNTER — HOSPITAL ENCOUNTER (INPATIENT)
Facility: MEDICAL CENTER | Age: 57
LOS: 3 days | DRG: 287 | End: 2017-03-23
Attending: EMERGENCY MEDICINE | Admitting: INTERNAL MEDICINE
Payer: COMMERCIAL

## 2017-03-17 DIAGNOSIS — R07.9 ACUTE CHEST PAIN: ICD-10-CM

## 2017-03-17 PROBLEM — I49.3 PVC (PREMATURE VENTRICULAR CONTRACTION): Status: ACTIVE | Noted: 2017-03-17

## 2017-03-17 PROBLEM — R00.1 BRADYCARDIA: Status: ACTIVE | Noted: 2017-03-17

## 2017-03-17 LAB
ANION GAP SERPL CALC-SCNC: 7 MMOL/L (ref 0–11.9)
APTT PPP: 26.4 SEC (ref 24.7–36)
BASOPHILS # BLD AUTO: 0.6 % (ref 0–1.8)
BASOPHILS # BLD: 0.04 K/UL (ref 0–0.12)
BNP SERPL-MCNC: 278 PG/ML (ref 0–100)
BUN SERPL-MCNC: 18 MG/DL (ref 8–22)
CALCIUM SERPL-MCNC: 9 MG/DL (ref 8.5–10.5)
CHLORIDE SERPL-SCNC: 108 MMOL/L (ref 96–112)
CO2 SERPL-SCNC: 22 MMOL/L (ref 20–33)
CREAT SERPL-MCNC: 1.06 MG/DL (ref 0.5–1.4)
DEPRECATED D DIMER PPP IA-ACNC: <200 NG/ML(D-DU)
EKG IMPRESSION: NORMAL
EOSINOPHIL # BLD AUTO: 0.04 K/UL (ref 0–0.51)
EOSINOPHIL NFR BLD: 0.6 % (ref 0–6.9)
ERYTHROCYTE [DISTWIDTH] IN BLOOD BY AUTOMATED COUNT: 42.7 FL (ref 35.9–50)
GFR SERPL CREATININE-BSD FRML MDRD: >60 ML/MIN/1.73 M 2
GLUCOSE SERPL-MCNC: 108 MG/DL (ref 65–99)
HCT VFR BLD AUTO: 45.9 % (ref 42–52)
HGB BLD-MCNC: 15.7 G/DL (ref 14–18)
IMM GRANULOCYTES # BLD AUTO: 0.03 K/UL (ref 0–0.11)
IMM GRANULOCYTES NFR BLD AUTO: 0.4 % (ref 0–0.9)
INR PPP: 0.96 (ref 0.87–1.13)
LYMPHOCYTES # BLD AUTO: 1.3 K/UL (ref 1–4.8)
LYMPHOCYTES NFR BLD: 18.5 % (ref 22–41)
MAGNESIUM SERPL-MCNC: 2 MG/DL (ref 1.5–2.5)
MCH RBC QN AUTO: 32 PG (ref 27–33)
MCHC RBC AUTO-ENTMCNC: 34.2 G/DL (ref 33.7–35.3)
MCV RBC AUTO: 93.5 FL (ref 81.4–97.8)
MONOCYTES # BLD AUTO: 0.34 K/UL (ref 0–0.85)
MONOCYTES NFR BLD AUTO: 4.8 % (ref 0–13.4)
NEUTROPHILS # BLD AUTO: 5.27 K/UL (ref 1.82–7.42)
NEUTROPHILS NFR BLD: 75.1 % (ref 44–72)
NRBC # BLD AUTO: 0 K/UL
NRBC BLD AUTO-RTO: 0 /100 WBC
PHOSPHATE SERPL-MCNC: 2.7 MG/DL (ref 2.5–4.5)
PLATELET # BLD AUTO: 181 K/UL (ref 164–446)
PMV BLD AUTO: 10.3 FL (ref 9–12.9)
POTASSIUM SERPL-SCNC: 4.7 MMOL/L (ref 3.6–5.5)
PROTHROMBIN TIME: 13.1 SEC (ref 12–14.6)
RBC # BLD AUTO: 4.91 M/UL (ref 4.7–6.1)
SODIUM SERPL-SCNC: 137 MMOL/L (ref 135–145)
T4 FREE SERPL-MCNC: 0.79 NG/DL (ref 0.53–1.43)
TROPONIN I SERPL-MCNC: 0.06 NG/ML (ref 0–0.04)
TROPONIN I SERPL-MCNC: 0.07 NG/ML (ref 0–0.04)
TSH SERPL DL<=0.005 MIU/L-ACNC: 2.72 UIU/ML (ref 0.3–3.7)
WBC # BLD AUTO: 7 K/UL (ref 4.8–10.8)

## 2017-03-17 PROCEDURE — 99285 EMERGENCY DEPT VISIT HI MDM: CPT

## 2017-03-17 PROCEDURE — 83880 ASSAY OF NATRIURETIC PEPTIDE: CPT

## 2017-03-17 PROCEDURE — 85730 THROMBOPLASTIN TIME PARTIAL: CPT

## 2017-03-17 PROCEDURE — G0378 HOSPITAL OBSERVATION PER HR: HCPCS

## 2017-03-17 PROCEDURE — 84484 ASSAY OF TROPONIN QUANT: CPT

## 2017-03-17 PROCEDURE — 85025 COMPLETE CBC W/AUTO DIFF WBC: CPT

## 2017-03-17 PROCEDURE — 99220 PR INITIAL OBSERVATION CARE,LEVL III: CPT | Performed by: HOSPITALIST

## 2017-03-17 PROCEDURE — 36415 COLL VENOUS BLD VENIPUNCTURE: CPT

## 2017-03-17 PROCEDURE — 84443 ASSAY THYROID STIM HORMONE: CPT

## 2017-03-17 PROCEDURE — 83735 ASSAY OF MAGNESIUM: CPT

## 2017-03-17 PROCEDURE — 700111 HCHG RX REV CODE 636 W/ 250 OVERRIDE (IP): Performed by: HOSPITALIST

## 2017-03-17 PROCEDURE — 85379 FIBRIN DEGRADATION QUANT: CPT

## 2017-03-17 PROCEDURE — 71010 DX-CHEST-PORTABLE (1 VIEW): CPT

## 2017-03-17 PROCEDURE — 84100 ASSAY OF PHOSPHORUS: CPT

## 2017-03-17 PROCEDURE — A9270 NON-COVERED ITEM OR SERVICE: HCPCS | Performed by: HOSPITALIST

## 2017-03-17 PROCEDURE — 84439 ASSAY OF FREE THYROXINE: CPT

## 2017-03-17 PROCEDURE — 93005 ELECTROCARDIOGRAM TRACING: CPT

## 2017-03-17 PROCEDURE — 85610 PROTHROMBIN TIME: CPT

## 2017-03-17 PROCEDURE — 80048 BASIC METABOLIC PNL TOTAL CA: CPT

## 2017-03-17 PROCEDURE — 700102 HCHG RX REV CODE 250 W/ 637 OVERRIDE(OP): Performed by: HOSPITALIST

## 2017-03-17 RX ORDER — ACETAMINOPHEN 325 MG/1
650 TABLET ORAL EVERY 6 HOURS PRN
Status: DISCONTINUED | OUTPATIENT
Start: 2017-03-17 | End: 2017-03-23 | Stop reason: HOSPADM

## 2017-03-17 RX ORDER — POLYETHYLENE GLYCOL 3350 17 G/17G
1 POWDER, FOR SOLUTION ORAL
Status: DISCONTINUED | OUTPATIENT
Start: 2017-03-17 | End: 2017-03-23 | Stop reason: HOSPADM

## 2017-03-17 RX ORDER — BISACODYL 10 MG
10 SUPPOSITORY, RECTAL RECTAL
Status: DISCONTINUED | OUTPATIENT
Start: 2017-03-17 | End: 2017-03-23 | Stop reason: HOSPADM

## 2017-03-17 RX ORDER — CHOLECALCIFEROL (VITAMIN D3) 125 MCG
2000 CAPSULE ORAL DAILY
COMMUNITY
End: 2022-09-09

## 2017-03-17 RX ORDER — ASPIRIN 81 MG/1
81 TABLET, CHEWABLE ORAL DAILY
Status: DISCONTINUED | OUTPATIENT
Start: 2017-03-18 | End: 2017-03-23 | Stop reason: HOSPADM

## 2017-03-17 RX ORDER — PROMETHAZINE HYDROCHLORIDE 25 MG/1
12.5-25 SUPPOSITORY RECTAL EVERY 4 HOURS PRN
Status: DISCONTINUED | OUTPATIENT
Start: 2017-03-17 | End: 2017-03-23 | Stop reason: HOSPADM

## 2017-03-17 RX ORDER — LEVOTHYROXINE SODIUM 0.03 MG/1
25 TABLET ORAL
Status: DISCONTINUED | OUTPATIENT
Start: 2017-03-18 | End: 2017-03-23 | Stop reason: HOSPADM

## 2017-03-17 RX ORDER — PROMETHAZINE HYDROCHLORIDE 25 MG/1
12.5-25 TABLET ORAL EVERY 4 HOURS PRN
Status: DISCONTINUED | OUTPATIENT
Start: 2017-03-17 | End: 2017-03-23 | Stop reason: HOSPADM

## 2017-03-17 RX ORDER — HEPARIN SODIUM 5000 [USP'U]/ML
5000 INJECTION, SOLUTION INTRAVENOUS; SUBCUTANEOUS EVERY 8 HOURS
Status: DISCONTINUED | OUTPATIENT
Start: 2017-03-17 | End: 2017-03-23 | Stop reason: HOSPADM

## 2017-03-17 RX ORDER — AMOXICILLIN 250 MG
2 CAPSULE ORAL 2 TIMES DAILY
Status: DISCONTINUED | OUTPATIENT
Start: 2017-03-17 | End: 2017-03-23 | Stop reason: HOSPADM

## 2017-03-17 RX ORDER — PRAVASTATIN SODIUM 20 MG
40 TABLET ORAL EVERY EVENING
Status: DISCONTINUED | OUTPATIENT
Start: 2017-03-17 | End: 2017-03-23 | Stop reason: HOSPADM

## 2017-03-17 RX ORDER — ONDANSETRON 2 MG/ML
4 INJECTION INTRAMUSCULAR; INTRAVENOUS EVERY 4 HOURS PRN
Status: DISCONTINUED | OUTPATIENT
Start: 2017-03-17 | End: 2017-03-23 | Stop reason: HOSPADM

## 2017-03-17 RX ORDER — ONDANSETRON 4 MG/1
4 TABLET, ORALLY DISINTEGRATING ORAL EVERY 4 HOURS PRN
Status: DISCONTINUED | OUTPATIENT
Start: 2017-03-17 | End: 2017-03-23 | Stop reason: HOSPADM

## 2017-03-17 RX ADMIN — PRAVASTATIN SODIUM 40 MG: 20 TABLET ORAL at 20:29

## 2017-03-17 RX ADMIN — Medication 2 TABLET: at 20:29

## 2017-03-17 RX ADMIN — HEPARIN SODIUM 5000 UNITS: 5000 INJECTION, SOLUTION INTRAVENOUS; SUBCUTANEOUS at 20:29

## 2017-03-17 ASSESSMENT — LIFESTYLE VARIABLES
HAVE PEOPLE ANNOYED YOU BY CRITICIZING YOUR DRINKING: NO
HAVE YOU EVER FELT YOU SHOULD CUT DOWN ON YOUR DRINKING: NO
HOW MANY TIMES IN THE PAST YEAR HAVE YOU HAD 5 OR MORE DRINKS IN A DAY: 0
TOTAL SCORE: 0
ON A TYPICAL DAY WHEN YOU DRINK ALCOHOL HOW MANY DRINKS DO YOU HAVE: 0
EVER HAD A DRINK FIRST THING IN THE MORNING TO STEADY YOUR NERVES TO GET RID OF A HANGOVER: NO
CONSUMPTION TOTAL: NEGATIVE
ALCOHOL_USE: NO
TOTAL SCORE: 0
EVER FELT BAD OR GUILTY ABOUT YOUR DRINKING: NO
TOTAL SCORE: 0
AVERAGE NUMBER OF DAYS PER WEEK YOU HAVE A DRINK CONTAINING ALCOHOL: 0
EVER_SMOKED: YES

## 2017-03-17 ASSESSMENT — PATIENT HEALTH QUESTIONNAIRE - PHQ9
SUM OF ALL RESPONSES TO PHQ9 QUESTIONS 1 AND 2: 0
1. LITTLE INTEREST OR PLEASURE IN DOING THINGS: NOT AT ALL
2. FEELING DOWN, DEPRESSED, IRRITABLE, OR HOPELESS: NOT AT ALL
SUM OF ALL RESPONSES TO PHQ QUESTIONS 1-9: 0

## 2017-03-17 ASSESSMENT — PAIN SCALES - GENERAL
PAINLEVEL_OUTOF10: 0
PAINLEVEL_OUTOF10: 0

## 2017-03-17 NOTE — IP AVS SNAPSHOT
" <p align=\"LEFT\"><IMG SRC=\"//[email or web address]$/[email or web address]\" alt=\"Image\" WIDTH=\"50%\" HEIGHT=\"200\" BORDER=\"\"></p>                   Name:Gagandeep Durbin Jr.  Medical Record Number:[id number]  CSN: [id number]    YOB: 1960   Age: 56 y.o.  Sex: male  HT:1.854 m (6' 0.99\") WT: 127 kg (279 lb 15.8 oz)          Admit Date: 3/17/2017     Discharge Date:   Today's Date: 3/23/2017  Attending Doctor:  Jeramie Kraft M.D.                  Allergies:  Review of patient's allergies indicates no known allergies.          Your appointments     Apr 28, 2017  7:00 AM   Adult Draw/Collection with [facility] (--)    [address] [place] [address] [phone]            Apr 28, 2017  9:20 AM   FOLLOW UP with Steve Ruelas M.D.   [facility] (--)    [address], [address]  [place] [address] [phone]            May 05, 2017  8:00 AM   Established Patient with FELIX Fournier   [facility] [place]    [address] [address] [phone]           You will be receiving a confirmation call a few days before your appointment from our automated call confirmation system.              Follow-up Information     1. Follow up with FELIX Fournier In 4 weeks.    Specialty:  Family Medicine    Contact information    [address] [place] [address] [phone]          2. Follow up with Aditya Carter M.D..    Specialty:  Cardiac Surgery    Why:  3/24 for consent signature    Contact information    [address]  [place] [address] [phone]          3. Follow up with Pre-op appointment.    Why:  0900    Contact information    [address]  [address]  [place] [address] [phone]         Medication List      Take these Medications        Instructions    Aug Betamethasone Dipropionate 0.05 % Crea   Commonly known as:  DIPROLENE-AF    Apply 1 g to affected area(s) 2 times a day.   Dose: "  1 g       carvedilol 3.125 MG Tabs   Commonly known as:  COREG    Take 1 Tab by mouth 2 times a day, with meals.   Dose:  3.125 mg       levothyroxine 25 MCG Tabs   Commonly known as:  SYNTHROID    Take 1 Tab by mouth Every morning on an empty stomach.   Dose:  25 mcg       lisinopril 5 MG Tabs   Commonly known as:  PRINIVIL    Take 1 Tab by mouth every day.   Dose:  5 mg       pravastatin 20 MG Tabs   Commonly known as:  PRAVACHOL    TAKE TWO TABLETS BY MOUTH ONCE DAILY       Vitamin D3 2000 UNITS Tabs    Take 2,000 Units by mouth every day.   Dose:  2000 Units

## 2017-03-17 NOTE — ED NOTES
"Pt complains of \"heavy breathing\". Pt was seen at [facility] in February and was instructed to follow-up with cardiologist for near syncope and unspecified cardiac arrhythmia. Pt admitted he did not follow up with cardiologist. Pt presents with trigeminy on monitor.   "

## 2017-03-17 NOTE — ED PROVIDER NOTES
"ED Provider Note    Scribed for Pasha Huizar M.D. by Irma Chairez. 3/17/2017  11:10 AM    Primary care provider: FELIX Fournier  Means of arrival: Walk-in  History obtained from: Patient  History limited by: None    CHIEF COMPLAINT  Chief Complaint   Patient presents with   • Shortness of Breath       HPI  Gagandeep Durbin Jr. is a 56 y.o. male who presents to the Emergency Department complaining of shortness of breath onset this morning.  Patient states he suddenly started breathing \"really heavy\" and he could not catch his breath.  He experienced two of these episodes today with the first one starting around 8:00AM.  Patient reports associated \"tightness\" in his chest, tingling sensation throughout his body, and ringing in his ears when these episodes occur. He denies any light headedness.  Patient has been in the ED recently in the past for evaluation of the same symptoms.  Per patient, the last time these symptoms presented themselves he was under a lot of pressure at work.  He states he has cut back drastically on his hours at work since, so when he began having these symptoms again this morning he wanted to come get re-evaluated.  Patient states he has always had a irregular heart rate.     REVIEW OF SYSTEMS  Pertinent positives include shortness of breath, tightness in chest, tingling sensation, ringing in both ears.   Pertinent negatives include no light headedness or fevers.    All other systems reviewed and negative.      PAST MEDICAL HISTORY   has a past medical history of Shortness of breath (10/14/2014); BMI 37.0-37.9, adult (10/14/2014); Hyperlipidemia (10/14/2014); Right knee pain (10/15/2014); Dry skin (10/15/2014); Accessory skin tags (10/15/2014); Enlarged heart (10/15/2014); Unspecified vitamin D deficiency (10/21/2014); Hypertension; Heart burn; Indigestion; Dental disorder; and NSTEMI (non-ST elevated myocardial infarction) (CMS-HCC) (2/26/2017).    SURGICAL HISTORY   has past surgical " "history that includes recovery (1/28/2015).    SOCIAL HISTORY  Social History   Substance Use Topics   • Smoking status: Former Smoker     Quit date: 05/14/2013   • Smokeless tobacco: Never Used   • Alcohol Use: Yes      Comment: 1-2 a night      History   Drug Use No       FAMILY HISTORY  Family History   Problem Relation Age of Onset   • Diabetes Mother    • Cancer Father    • Heart Disease Father    • Heart Failure Father        CURRENT MEDICATIONS  Home Medications     Reviewed by Hari Madrid R.N. (Registered Nurse) on 03/17/17 at 1140  Med List Status: Complete    Medication Last Dose Status    aspirin 81 MG tablet 3/17/2017 Active    Aug Betamethasone Dipropionate (DIPROLENE-AF) 0.05 % Cream  Active    Cholecalciferol (VITAMIN D-3 PO) 3/17/2017 Active    Garlic 1000 MG Cap 3/17/2017 Active    levothyroxine (SYNTHROID) 25 MCG Tab 3/17/2017 Active    pravastatin (PRAVACHOL) 20 MG Tab 3/17/2017 Active                ALLERGIES  No Known Allergies    PHYSICAL EXAM  VITAL SIGNS: /80 mmHg  Pulse 35  Temp(Src) 36.2 °C (97.1 °F)  Resp 18  Ht 1.854 m (6' 0.99\")  Wt 121 kg (266 lb 12.1 oz)  BMI 35.20 kg/m2  SpO2 97%    Nursing note and vitals reviewed.  Constitutional: Well-developed and well-nourished. No distress.   HENT: Head is normocephalic and atraumatic. Oropharynx is clear and moist without exudate or erythema.   Eyes: Pupils are equal, round, and reactive to light. Conjunctiva are normal.   Cardiovascular: Regularly irregular heart rhythm that presented as bigeminy on the monitor . Regular heart rate.  No murmur heard. Normal radial pulses.  Pulmonary/Chest: Breath sounds normal. No wheezes or rales.   Abdominal: Obese, Soft and non-tender. No distention    Musculoskeletal: Extremities exhibit normal range of motion, trace bilateral lower extremity edema, No tenderness.   Neurological: Awake, alert and oriented to person, place, and time. No focal deficits noted.  Skin: Skin is warm and " dry. No rash.   Psychiatric: Normal mood and affect. Appropriate for clinical situation    EKG Interpretation  See labs below. Interpreted by me.     LABS  Results for orders placed or performed during the hospital encounter of 03/17/17   CBC WITH DIFFERENTIAL   Result Value Ref Range    WBC 7.0 4.8 - 10.8 K/uL    RBC 4.91 4.70 - 6.10 M/uL    Hemoglobin 15.7 14.0 - 18.0 g/dL    Hematocrit 45.9 42.0 - 52.0 %    MCV 93.5 81.4 - 97.8 fL    MCH 32.0 27.0 - 33.0 pg    MCHC 34.2 33.7 - 35.3 g/dL    RDW 42.7 35.9 - 50.0 fL    Platelet Count 181 164 - 446 K/uL    MPV 10.3 9.0 - 12.9 fL    Neutrophils-Polys 75.10 (H) 44.00 - 72.00 %    Lymphocytes 18.50 (L) 22.00 - 41.00 %    Monocytes 4.80 0.00 - 13.40 %    Eosinophils 0.60 0.00 - 6.90 %    Basophils 0.60 0.00 - 1.80 %    Immature Granulocytes 0.40 0.00 - 0.90 %    Nucleated RBC 0.00 /100 WBC    Neutrophils (Absolute) 5.27 1.82 - 7.42 K/uL    Lymphs (Absolute) 1.30 1.00 - 4.80 K/uL    Monos (Absolute) 0.34 0.00 - 0.85 K/uL    Eos (Absolute) 0.04 0.00 - 0.51 K/uL    Baso (Absolute) 0.04 0.00 - 0.12 K/uL    Immature Granulocytes (abs) 0.03 0.00 - 0.11 K/uL    NRBC (Absolute) 0.00 K/uL   BASIC METABOLIC PANEL   Result Value Ref Range    Sodium 137 135 - 145 mmol/L    Potassium 4.7 3.6 - 5.5 mmol/L    Chloride 108 96 - 112 mmol/L    Co2 22 20 - 33 mmol/L    Glucose 108 (H) 65 - 99 mg/dL    Bun 18 8 - 22 mg/dL    Creatinine 1.06 0.50 - 1.40 mg/dL    Calcium 9.0 8.5 - 10.5 mg/dL    Anion Gap 7.0 0.0 - 11.9   TROPONIN   Result Value Ref Range    Troponin I 0.06 (H) 0.00 - 0.04 ng/mL   BTYPE NATRIURETIC PEPTIDE   Result Value Ref Range    B Natriuretic Peptide 278 (H) 0 - 100 pg/mL   PROTHROMBIN TIME (INR)   Result Value Ref Range    PT 13.1 12.0 - 14.6 sec    INR 0.96 0.87 - 1.13   APTT   Result Value Ref Range    APTT 26.4 24.7 - 36.0 sec   TSH   Result Value Ref Range    TSH 2.720 0.300 - 3.700 uIU/mL   FREE THYROXINE   Result Value Ref Range    Free T-4 0.79 0.53 - 1.43 ng/dL    D-DIMER (only helpful in low pre-test probability wells critieria. Do not order if patient ruled out by PERC criteria. See Weblinks at top of Labs section)   Result Value Ref Range    D-Dimer Screen <200 <250 ng/mL(D-DU)   ESTIMATED GFR   Result Value Ref Range    GFR If African American >60 >60 mL/min/1.73 m 2    GFR If Non African American >60 >60 mL/min/1.73 m 2   EKG (NOW)   Result Value Ref Range    Report       [facility] Emergency Dept.    Test Date:  2017  Pt Name:    DEMARIO AGUIRRE                    Department: ER  MRN:        [id number]                      Room:  Gender:                                 Technician: [id number]  :        1960                   Requested By:ER TRIAGE PROTOCOL  Order #:    [id number]                    Reading MD: MARIAJOSE PYLE MD    Measurements  Intervals                                Axis  Rate:       60                           P:          -7  IL:         168                          QRS:        -55  QRSD:       118                          T:          221  QT:         492  QTc:        492    Interpretive Statements  SINUS RHYTHM  MULTIPLE VENTRICULAR PREMATURE COMPLEXES  LVH WITH IVCD, LAD AND SECONDARY REPOL ABNRM  Compared to ECG 2017 06:39:18  Left anterior fascicular block no longer present    Electronically Signed On 3- 11:10:25 PDT by MARIAJOSE PYLE MD       All labs reviewed by me.    RADIOLOGY  DX-CHEST-PORTABLE (1 VIEW)   Final Result      1.  Hypoinflation without other evidence for acute cardiopulmonary disease.   2.  Stable cardiomegaly.        The radiologist's interpretation of all radiological studies have been reviewed by me.    COURSE & MEDICAL DECISION MAKING  Nursing notes, VS, PMSFHx reviewed in chart.     Review of past medical records shows the patient was admitted to the hospital in 2017 for evaluation of dizziness.     11:10 AM - Patient seen and examined at bedside. Ordered chest x-ray, CBC,  basic metabolic panel, troponin, BNP, prothrombin time, APTT, TSH, free thyroxine, d-dimer, EKG to evaluate his symptoms. The differential diagnoses include but are not limited to: Arrhythmia, ACS, pneumonia    1:48 PM the patient presents today with episodes of chest heaviness. They last for on the order of minutes. In the emergency department extremely the patient has a sustained arrhythmia. He does have episodes of nonsustained V. tach I see approximately 3 or 4 beat runs of V. tach on the monitor but have not caught these on the EKG. In addition the patient has persistent bigeminy. I question whether or not the patient's episodes that he had this morning were related to a more sustained arrhythmia. The patient will be admitted to the hospital for further evaluation. I spoke with the on-call hospitalist Dr. Donis for admission. In addition Dr. Ibrahim will consult    DISPOSITION:  Patient will be admitted to Dr. Donis in stable condition.      FINAL IMPRESSION  1. Acute chest pain          Irma GONZALEZ (Adebayo), am scribing for, and in the presence of, Pasha Huizar M.D..    Electronically signed by: Irma Chairez (Adebayo), 3/17/2017    IPasha M.D. personally performed the services described in this documentation, as scribed by Irma Chairez in my presence, and it is both accurate and complete.    The note accurately reflects work and decisions made by me.  Pasha Huizar  3/17/2017  1:49 PM

## 2017-03-17 NOTE — IP AVS SNAPSHOT
" Home Care Instructions                                                                                                                  Name:Gagandeep Durbin Jr.  Medical Record Number:[id number]  CSN: [id number]    YOB: 1960   Age: 56 y.o.  Sex: male  HT:1.854 m (6' 0.99\") WT: 127 kg (279 lb 15.8 oz)          Admit Date: 3/17/2017     Discharge Date:   Today's Date: 3/23/2017  Attending Doctor:  Jeramie Kraft M.D.                  Allergies:  Review of patient's allergies indicates no known allergies.            Discharge Instructions       Discharge Instructions    Discharged to home by car with relative. Discharged via walking, hospital escort: Yes.  Special equipment needed: Not Applicable    Be sure to schedule a follow-up appointment with your primary care doctor or any specialists as instructed.     Discharge Plan:   Smoking Cessation Offered: Patient Refused  Influenza Vaccine Indication: Patient Refuses    I understand that a diet low in cholesterol, fat, and sodium is recommended for good health. Unless I have been given specific instructions below for another diet, I accept this instruction as my diet prescription.         · Is patient discharged on Warfarin / Coumadin?   No     · Is patient Post Blood Transfusion?  No      Aortic Valve Replacement   Aortic valve replacement is a procedure to replace an aortic valve that cannot be repaired. An artificial (prosthetic) valve is used to do this. Three types of prosthetic valves are available:   · Mechanical valves made entirely from man-made materials.    · Donor valves made from human donors. These are only used in special situations.    · Biological valves made from animal tissues.    The type of prosthetic valve used will be determined based on various factors, including your age, your lifestyle, and other medical conditions you have.   LET YOUR HEALTH CARE PROVIDER KNOW ABOUT:  · Any allergies you have.  · All medicines you are taking, including " vitamins, herbs, eye drops, creams, and over-the-counter medicines.  · Previous problems you or members of your family have had with the use of anesthetics.  · Any blood disorders you have.  · Previous surgeries you have had.  · Medical conditions you have.  RISKS AND COMPLICATIONS  Generally, this is a safe procedure. However, as with any procedure, problems can occur. Possible problems include:   · Blood clotting caused by the new valve. Replacement with a mechanical valve requires lifelong treatment with medicine to prevent blood clots.    · Infection in the new valve.    · Valve failure.    · Bleeding.    · Reaction to anesthetics.    BEFORE THE PROCEDURE  · Ask your health care provider about:  ¨ Changing or stopping your regular medicines. This is especially important if you are taking diabetes medicines or blood thinners.  ¨ Taking medicines such as aspirin and ibuprofen. These medicines can thin your blood. Do not take these medicines before your procedure if your health care provider asks you not to.  · Do not eat or drink anything after midnight on the night before the procedure or as directed by your health care provider.  PROCEDURE   The surgeon may use either an open technique or a minimally invasive technique for this surgery:   Traditional open surgery  · You will be given a medicine that makes you fall asleep (general anesthetic).  · You will then be placed on a heart-lung bypass machine. This machine provides oxygen to your blood while the heart is undergoing surgery.  · During surgery, the surgeon will make a large cut (incision) in the chest.  · The heart will be cooled to slow or stop the heartbeat.  · The damaged aortic valve will be removed and replaced with a prosthetic heart valve.  · The incision will then be closed with staples or stitches.  Minimally invasive surgery  This is done through a smaller incision. This still requires general anesthetic and the heart-lung bypass machine. Your  heart will be cooled to slow or stop the heartbeat, allowing the damaged valve to be removed and replaced with the new valve. The smaller incision will then be closed. If your condition allows for this procedure, there is often less blood loss, less pain, and faster recovery compared to traditional open surgery.   AFTER THE PROCEDURE  You will be monitored closely in a recovery area. From there, you will likely go to an intensive care unit.       This information is not intended to replace advice given to you by your health care provider. Make sure you discuss any questions you have with your health care provider.     Document Released: 05/09/2006 Document Revised: 01/08/2016 Document Reviewed: 05/27/2014  [facility] Interactive Patient Education ©2016 [employer]      Depression / Suicide Risk    As you are discharged from this [facility] facility, it is important to learn how to keep safe from harming yourself.    Recognize the warning signs:  · Abrupt changes in personality, positive or negative- including increase in energy   · Giving away possessions  · Change in eating patterns- significant weight changes-  positive or negative  · Change in sleeping patterns- unable to sleep or sleeping all the time   · Unwillingness or inability to communicate  · Depression  · Unusual sadness, discouragement and loneliness  · Talk of wanting to die  · Neglect of personal appearance   · Rebelliousness- reckless behavior  · Withdrawal from people/activities they love  · Confusion- inability to concentrate     If you or a loved one observes any of these behaviors or has concerns about self-harm, here's what you can do:  · Talk about it- your feelings and reasons for harming yourself  · Remove any means that you might use to hurt yourself (examples: pills, rope, extension cords, firearm)  · Get professional help from the community (Mental Health, Substance Abuse, psychological counseling)  · Do not be alone:Call your Safe  Contact- someone whom you trust who will be there for you.  · Call your local CRISIS HOTLINE 849-1363 or 273-227-3232  · Call your local Children's Mobile Crisis Response Team [place] (647) 597-7365 or www.Decisionlink  · Call the toll free National Suicide Prevention Hotlines   · National Suicide Prevention Lifeline 865-917-ZSWW (0787)  · National Hope Line Network 800-SUICIDE (034-5459)        Your appointments     Apr 28, 2017  7:00 AM   Adult Draw/Collection with [facility] (--)    [address] [place] [address] [address]. [place] [address] [phone]            Apr 28, 2017  9:20 AM   FOLLOW UP with Steve Ruelas M.D.   [facility] (--)    [address], [address]  [place] [address] [phone]            May 05, 2017  8:00 AM   Established Patient with FELIX Fournier   [facility] [place]    [address]  [place] [address] [phone]           You will be receiving a confirmation call a few days before your appointment from our automated call confirmation system.              Follow-up Information     1. Follow up with FELIX Fournier In 4 weeks.    Specialty:  Family Medicine    Contact information    [address] [place] [address] [address] [place] [address] [phone]          2. Follow up with Aditya Carter M.D..    Specialty:  Cardiac Surgery    Why:  3/24 for consent signature    Contact information    [address]  [place] [address] [phone]          3. Follow up with Pre-op appointment.    Why:  0900    Contact information    [address]  [address]  [place] [address] [phone]         Discharge Medication Instructions:    Below are the medications your physician expects you to take upon discharge:    Review all your home medications and newly ordered medications with your doctor and/or pharmacist. Follow medication instructions as directed by your doctor and/or pharmacist.    Please keep  your medication list with you and share with your physician.               Medication List      START taking these medications        Instructions    carvedilol 3.125 MG Tabs   Last time this was given:  3.125 mg on 3/23/2017  8:42 AM   Commonly known as:  COREG   Next Dose Due:  Take tonight with dinner     Take 1 Tab by mouth 2 times a day, with meals.   Dose:  3.125 mg       lisinopril 5 MG Tabs   Last time this was given:  5 mg on 3/23/2017  8:42 AM   Commonly known as:  PRINIVIL   Next Dose Due:  Take tomorrow morning     Take 1 Tab by mouth every day.   Dose:  5 mg         CONTINUE taking these medications        Instructions    Aug Betamethasone Dipropionate 0.05 % Crea   Commonly known as:  DIPROLENE-AF    Apply 1 g to affected area(s) 2 times a day.   Dose:  1 g       levothyroxine 25 MCG Tabs   Last time this was given:  25 mcg on 3/23/2017  5:51 AM   Commonly known as:  SYNTHROID   Next Dose Due:  Take tomorrow morning     Take 1 Tab by mouth Every morning on an empty stomach.   Dose:  25 mcg       pravastatin 20 MG Tabs   Last time this was given:  40 mg on 3/22/2017  8:08 PM   Commonly known as:  PRAVACHOL   Next Dose Due:  Take tonight     TAKE TWO TABLETS BY MOUTH ONCE DAILY       Vitamin D3 2000 UNITS Tabs   Next Dose Due:  Take tonight     Take 2,000 Units by mouth every day.   Dose:  2000 Units         STOP taking these medications     aspirin 81 MG tablet       Garlic 1000 MG Caps               Instructions           Diet / Nutrition:    Follow any diet instructions given to you by your doctor or the dietician, including how much salt (sodium) you are allowed each day.    If you are overweight, talk to your doctor about a weight reduction plan.    Activity:    Remain physically active following your doctor's instructions about exercise and activity.    Rest often.     Any time you become even a little tired or short of breath, SIT DOWN and rest.    Worsening Symptoms:    Report any of the  following signs and symptoms to the doctor's office immediately:    *Pain of jaw, arm, or neck  *Chest pain not relieved by medication                               *Dizziness or loss of consciousness  *Difficulty breathing even when at rest   *More tired than usual                                       *Bleeding drainage or swelling of surgical site  *Swelling of feet, ankles, legs or stomach                 *Fever (>100ºF)  *Pink or blood tinged sputum  *Weight gain (3lbs/day or 5lbs /week)           *Shock from internal defibrillator (if applicable)  *Palpitations or irregular heartbeats                *Cool and/or numb extremities    Stroke Awareness    Common Risk Factors for Stroke include:    Age  Atrial Fibrillation  Carotid Artery Stenosis  Diabetes Mellitus  Excessive alcohol consumption  High blood pressure  Overweight   Physical inactivity  Smoking    Warning signs and symptoms of a stroke include:    *Sudden numbness or weakness of the face, arm or leg (especially on one side of the body).  *Sudden confusion, trouble speaking or understanding.  *Sudden trouble seeing in one or both eyes.  *Sudden trouble walking, dizziness, loss of balance or coordination.Sudden severe headache with no known cause.    It is very important to get treatment quickly when a stroke occurs. If you experience any of the above warning signs, call 911 immediately.                   Disclaimer         Quit Smoking / Tobacco Use:    I understand the use of any tobacco products increases my chance of suffering from future heart disease or stroke and could cause other illnesses which may shorten my life. Quitting the use of tobacco products is the single most important thing I can do to improve my health. For further information on smoking / tobacco cessation call a Toll Free Quit Line at 1-996.735.1651 (*National Cancer [place]) or 1-951.296.3288 (American Lung Association) or you can access the web based program at  www.lungusa.org.    [place] Tobacco Users Help Line:  (463) 392-1525       Toll Free: 1-555.697.8085  Quit Tobacco Program [facility] (738)595-7051    Crisis Hotline:    [place] Crisis Hotline:  4-449-JPXSWRE or 1-930.360.9252    [place] Crisis Hotline:    1-173.367.4028 or 736-876-2978    Discharge Survey:   Thank you for choosing [facility]. We hope we did everything we could to make your hospital stay a pleasant one. You may be receiving a phone survey and we would appreciate your time and participation in answering the questions. Your input is very valuable to us in our efforts to improve our service to our patients and their families.        My signature on this form indicates that:    1. I have reviewed and understand the above information.  2. My questions regarding this information have been answered to my satisfaction.  3. I have formulated a plan with my discharge nurse to obtain my prescribed medications for home.                  Disclaimer         __________________________________                     __________       ________                       Patient Signature                                                 Date                    Time

## 2017-03-17 NOTE — ED NOTES
The Medication Reconciliation process has been completed by interviewing the patient    Allergies have been reviewed  Antibiotic use in 30 days - none    Home Pharmacy:  [facility] [place]

## 2017-03-17 NOTE — ED NOTES
Pt ambulates to triage  Chief Complaint   Patient presents with   • Shortness of Breath   pulse 35 in triage   EKG show trigeminy  Pt denies CP, weakness, dizziness and lightlessness  Apt cardiology in [facility]  Recently here for same taken off HTN meds  Pt asked to wait in lobby, pt updated on triage process and pt asked to inform RN of any changes.

## 2017-03-17 NOTE — IP AVS SNAPSHOT
3/23/2017          Gagandeep [place] [address]    Dear Gagandeep:    [facility] wants to ensure your discharge home is safe and you or your loved ones have had all your questions answered regarding your care after you leave the hospital.    You may receive a telephone call within two days of your discharge.  This call is to make certain you understand your discharge instructions as well as ensure we provided you with the best care possible during your stay with us.     The call will only last approximately 3-5 minutes and will be done by a nurse.    Once again, we want to ensure your discharge home is safe and that you have a clear understanding of any next steps in your care.  If you have any questions or concerns, please do not hesitate to contact us, we are here for you.  Thank you for choosing [facility] for your healthcare needs.    Sincerely,    Eros Mccormick    [facility]

## 2017-03-17 NOTE — IP AVS SNAPSHOT
powervault Access Code: [email or web address]  Expires: 4/4/2017 11:35 AM    powervault  A secure, online tool to manage your health information     [employer]’s powervault® is a secure, online tool that connects you to your personalized health information from the privacy of your home -- day or night - making it very easy for you to manage your healthcare. Once the activation process is completed, you can even access your medical information using the powervault katiana, which is available for free in the Apple Katiana store or Google Play store.     powervault provides the following levels of access (as shown below):   My Chart Features   [facility] Primary Care Doctor [facility]  Specialists [facility]  Urgent  Care Non-[facility]  Primary Care  Doctor   Email your healthcare team securely and privately 24/7 X X X X   Manage appointments: schedule your next appointment; view details of past/upcoming appointments X      Request prescription refills. X      View recent personal medical records, including lab and immunizations X X X X   View health record, including health history, allergies, medications X X X X   Read reports about your outpatient visits, procedures, consult and ER notes X X X X   See your discharge summary, which is a recap of your hospital and/or ER visit that includes your diagnosis, lab results, and care plan. X X       How to register for powervault:  1. Go to  [email or web address].  2. Click on the Sign Up Now box, which takes you to the New Member Sign Up page. You will need to provide the following information:  a. Enter your powervault Access Code exactly as it appears at the top of this page. (You will not need to use this code after you’ve completed the sign-up process. If you do not sign up before the expiration date, you must request a new code.)   b. Enter your date of birth.   c. Enter your home email address.   d. Click Submit, and follow the next screen’s instructions.  3. Create a powervault ID. This will be your powervault  login ID and cannot be changed, so think of one that is secure and easy to remember.  4. Create a [employer] password. You can change your password at any time.  5. Enter your Password Reset Question and Answer. This can be used at a later time if you forget your password.   6. Enter your e-mail address. This allows you to receive e-mail notifications when new information is available in [employer].  7. Click Sign Up. You can now view your health information.    For assistance activating your [employer] Systems account, call [phone]

## 2017-03-17 NOTE — CONSULTS
Reason of Consult: Dizzyness    Consulting Physician: ARNEL    HPI:  56M with recurrent orthostatic symptoms presents one month after recent admission for orthostasis and NSTEMI, not seen by cardiology since 2015 (Dr. Ruelas). Has a history of moderate AS by KIARRA and abnormal MPI in 2014 without any invasive evaluation in the past. Feeling well until several weeks ago when noted orthostatic symptoms and paroxysmal substernal chest pressure and dyspnea lasting 30s-1 minute. Has not felt better since recent DC and had several stacked episodes today resulting in his presentation to ER. Former smoker. Drinks 3 beers/week.    Denies any other cardiovascular symptoms includin dyspnea on exertion, lightheadedness, syncope or presyncope, lower extremity edema, PND, orthopnea.      Past Medical History   Diagnosis Date   • Shortness of breath 10/14/2014   • BMI 37.0-37.9, adult 10/14/2014   • Hyperlipidemia 10/14/2014   • Right knee pain 10/15/2014   • Dry skin 10/15/2014   • Accessory skin tags 10/15/2014   • Enlarged heart 10/15/2014   • Unspecified vitamin D deficiency 10/21/2014   • Hypertension    • Heart burn    • Indigestion    • Dental disorder      partial   • NSTEMI (non-ST elevated myocardial infarction) ([facility]) 2/26/2017       Past Surgical History   Procedure Laterality Date   • Recovery  1/28/2015     Performed by [facility] at SURGERY SAME DAY [facility]       No current facility-administered medications on file prior to encounter.     Current Outpatient Prescriptions on File Prior to Encounter   Medication Sig Dispense Refill   • Garlic 1000 MG Cap Take 1 Cap by mouth every day.     • Aug Betamethasone Dipropionate (DIPROLENE-AF) 0.05 % Cream Apply 1 g to affected area(s) 2 times a day.     • levothyroxine (SYNTHROID) 25 MCG Tab Take 1 Tab by mouth Every morning on an empty stomach. 90 Tab 0   • pravastatin (PRAVACHOL) 20 MG Tab TAKE TWO TABLETS BY MOUTH ONCE DAILY 90 Tab 1   • aspirin 81 MG tablet  "Take 81 mg by mouth every day.         Current Facility-Administered Medications   Medication Dose Frequency Provider Last Rate Last Dose   • senna-docusate (PERICOLACE or SENOKOT S) 8.6-50 MG per tablet 2 Tab  2 Tab BID Alcides Donis M.D.        And   • polyethylene glycol/lytes (MIRALAX) PACKET 1 Packet  1 Packet QDAY PRN Alcides Donis M.D.        And   • magnesium hydroxide (MILK OF MAGNESIA) suspension 30 mL  30 mL QDAY PRN Alcides Donis M.D.        And   • bisacodyl (DULCOLAX) suppository 10 mg  10 mg QDAY PRVENUS Donis M.D.       • ondansetron (ZOFRAN) syringe/vial injection 4 mg  4 mg Q4HRS PRVENUS Donis M.D.       • ondansetron (ZOFRAN ODT) dispertab 4 mg  4 mg Q4HRS PRVENUS Donis M.D.       • promethazine (PHENERGAN) tablet 12.5-25 mg  12.5-25 mg Q4HRS PRVENUS Donis M.D.       • promethazine (PHENERGAN) suppository 12.5-25 mg  12.5-25 mg Q4HRS PRVENUS Donis M.D.       • prochlorperazine (COMPAZINE) injection 5-10 mg  5-10 mg Q4HRS PRVENUS Donis M.D.         Last reviewed on 3/17/2017  1:23 PM by Hafsa Sanchez    Review of patient's allergies indicates no known allergies.    Family History   Problem Relation Age of Onset   • Diabetes Mother    • Cancer Father    • Heart Disease Father    • Heart Failure Father        ROS: As HPI other reviewed and negative     Physical Exam   Blood pressure 170/80, pulse 51, temperature 36.2 °C (97.1 °F), resp. rate 18, height 1.854 m (6' 0.99\"), weight 121 kg (266 lb 12.1 oz), SpO2 94 %.    Constitutional: Obese. Appears well-developed.   HENT: Normocephalic and atraumatic. No scleral icterus.   Neck: No JVD present.   Cardiovascular: Normal rate. Frequent ectopy. Exam reveals no gallop and no friction rub. 3/6 SUNIL LUSB heard.   Pulmonary/Chest: CTAB   Abdominal: S/NT/ND BS+   Musculoskeletal:  Pulses present. No atrophy. Strength normal.   Extremities: Exhibits no edema. No clubbing or cyanosis. Slow capillary refill.   Skin: Skin is cool " and dry.   Neuro: Non-focal, CN 2-12 intact grossly    No intake or output data in the 24 hours ending 03/17/17 1539    Recent Labs      03/17/17   1130   WBC  7.0   RBC  4.91   HEMOGLOBIN  15.7   HEMATOCRIT  45.9   MCV  93.5   MCH  32.0   MCHC  34.2   RDW  42.7   PLATELETCT  181   MPV  10.3     Recent Labs      03/17/17   1130   SODIUM  137   POTASSIUM  4.7   CHLORIDE  108   CO2  22   GLUCOSE  108*   BUN  18   CREATININE  1.06   CALCIUM  9.0     Recent Labs      03/17/17   1130   APTT  26.4   INR  0.96     Recent Labs      03/17/17   1130   BNPBTYPENAT  278*     Recent Labs      03/17/17   1130   TROPONINI  0.06*   BNPBTYPENAT  278*           EKG (3/17/2016):  I have personally reviewed the EKG this visit and discussed with the patient. It shows SR, LVH, frequent monofocal PVCs.    Imaging reviewed    Impressions:  1. Atypical chest pain  2. Episodic dyspnea  3. Frequent PVCs with NSVT in ER  4. Mild-moderate mixed aortic disease as of 2014  5. H/O abnormal MPI  6. Obesity  7. HTN untreated    Recommendations:  His las echo was misread as not having any abnormalities, as it was of poor quality. However he has known valvular heart disease by history imaging and exam. Frequent ectopy with NSVT may be responsible for his symptoms, as could progression of AS or CAD given his abnormal stress from 2014. Also his untreated HTN could be at cause.    1. Echo with contrast. If unclear, will proceed to KIARRA or invasive valve study  2. Lisinopril 5mg daily, Diltiazem 60mg TID, aspirin 81mg  3. Trend troponin, telemetry  4. Depending on above, likely coronary angiography this admission. It would of course be beneficial to know the true status of his aortic valve prior to proceeding with cath.    Thank you for this interesting consultation. It was my pleasure to see Gagandeep Durbin Jr. today.

## 2017-03-18 LAB
ALBUMIN SERPL BCP-MCNC: 3.4 G/DL (ref 3.2–4.9)
BASOPHILS # BLD AUTO: 0.8 % (ref 0–1.8)
BASOPHILS # BLD: 0.05 K/UL (ref 0–0.12)
BUN SERPL-MCNC: 18 MG/DL (ref 8–22)
CALCIUM SERPL-MCNC: 8.3 MG/DL (ref 8.5–10.5)
CHLORIDE SERPL-SCNC: 107 MMOL/L (ref 96–112)
CO2 SERPL-SCNC: 21 MMOL/L (ref 20–33)
CORTIS SERPL-MCNC: 7.2 UG/DL (ref 0–23)
CREAT SERPL-MCNC: 1.22 MG/DL (ref 0.5–1.4)
EOSINOPHIL # BLD AUTO: 0.16 K/UL (ref 0–0.51)
EOSINOPHIL NFR BLD: 2.7 % (ref 0–6.9)
ERYTHROCYTE [DISTWIDTH] IN BLOOD BY AUTOMATED COUNT: 42.4 FL (ref 35.9–50)
GFR SERPL CREATININE-BSD FRML MDRD: >60 ML/MIN/1.73 M 2
GLUCOSE SERPL-MCNC: 118 MG/DL (ref 65–99)
HCT VFR BLD AUTO: 43.2 % (ref 42–52)
HGB BLD-MCNC: 14.8 G/DL (ref 14–18)
IMM GRANULOCYTES # BLD AUTO: 0.02 K/UL (ref 0–0.11)
IMM GRANULOCYTES NFR BLD AUTO: 0.3 % (ref 0–0.9)
LV EJECT FRACT  99904: 45
LV EJECT FRACT MOD 2C 99903: 48.68
LV EJECT FRACT MOD 4C 99902: 53.36
LV EJECT FRACT MOD BP 99901: 52.64
LYMPHOCYTES # BLD AUTO: 1.72 K/UL (ref 1–4.8)
LYMPHOCYTES NFR BLD: 28.8 % (ref 22–41)
MCH RBC QN AUTO: 31.8 PG (ref 27–33)
MCHC RBC AUTO-ENTMCNC: 34.3 G/DL (ref 33.7–35.3)
MCV RBC AUTO: 92.7 FL (ref 81.4–97.8)
MONOCYTES # BLD AUTO: 0.29 K/UL (ref 0–0.85)
MONOCYTES NFR BLD AUTO: 4.8 % (ref 0–13.4)
NEUTROPHILS # BLD AUTO: 3.74 K/UL (ref 1.82–7.42)
NEUTROPHILS NFR BLD: 62.6 % (ref 44–72)
NRBC # BLD AUTO: 0 K/UL
NRBC BLD AUTO-RTO: 0 /100 WBC
PHOSPHATE SERPL-MCNC: 3.3 MG/DL (ref 2.5–4.5)
PLATELET # BLD AUTO: 159 K/UL (ref 164–446)
PMV BLD AUTO: 10.6 FL (ref 9–12.9)
POTASSIUM SERPL-SCNC: 4 MMOL/L (ref 3.6–5.5)
RBC # BLD AUTO: 4.66 M/UL (ref 4.7–6.1)
SODIUM SERPL-SCNC: 136 MMOL/L (ref 135–145)
TROPONIN I SERPL-MCNC: 0.06 NG/ML (ref 0–0.04)
WBC # BLD AUTO: 6 K/UL (ref 4.8–10.8)

## 2017-03-18 PROCEDURE — 93306 TTE W/DOPPLER COMPLETE: CPT

## 2017-03-18 PROCEDURE — A9270 NON-COVERED ITEM OR SERVICE: HCPCS | Performed by: HOSPITALIST

## 2017-03-18 PROCEDURE — 700102 HCHG RX REV CODE 250 W/ 637 OVERRIDE(OP): Performed by: HOSPITALIST

## 2017-03-18 PROCEDURE — 700102 HCHG RX REV CODE 250 W/ 637 OVERRIDE(OP): Performed by: INTERNAL MEDICINE

## 2017-03-18 PROCEDURE — 82533 TOTAL CORTISOL: CPT

## 2017-03-18 PROCEDURE — 700111 HCHG RX REV CODE 636 W/ 250 OVERRIDE (IP): Performed by: HOSPITALIST

## 2017-03-18 PROCEDURE — 93306 TTE W/DOPPLER COMPLETE: CPT | Mod: 26 | Performed by: INTERNAL MEDICINE

## 2017-03-18 PROCEDURE — 85025 COMPLETE CBC W/AUTO DIFF WBC: CPT

## 2017-03-18 PROCEDURE — A9270 NON-COVERED ITEM OR SERVICE: HCPCS | Performed by: INTERNAL MEDICINE

## 2017-03-18 PROCEDURE — G0378 HOSPITAL OBSERVATION PER HR: HCPCS

## 2017-03-18 PROCEDURE — 36415 COLL VENOUS BLD VENIPUNCTURE: CPT

## 2017-03-18 PROCEDURE — 80069 RENAL FUNCTION PANEL: CPT

## 2017-03-18 PROCEDURE — 84484 ASSAY OF TROPONIN QUANT: CPT

## 2017-03-18 PROCEDURE — 99226 PR SUBSEQUENT OBSERVATION CARE,LEVEL III: CPT | Performed by: INTERNAL MEDICINE

## 2017-03-18 RX ORDER — LISINOPRIL 5 MG/1
5 TABLET ORAL
Status: DISCONTINUED | OUTPATIENT
Start: 2017-03-18 | End: 2017-03-23 | Stop reason: HOSPADM

## 2017-03-18 RX ADMIN — LEVOTHYROXINE SODIUM 25 MCG: 25 TABLET ORAL at 05:20

## 2017-03-18 RX ADMIN — DILTIAZEM HYDROCHLORIDE 30 MG: 30 TABLET, FILM COATED ORAL at 13:19

## 2017-03-18 RX ADMIN — HEPARIN SODIUM 5000 UNITS: 5000 INJECTION, SOLUTION INTRAVENOUS; SUBCUTANEOUS at 05:20

## 2017-03-18 RX ADMIN — ASPIRIN 81 MG: 81 TABLET, CHEWABLE ORAL at 09:26

## 2017-03-18 RX ADMIN — PRAVASTATIN SODIUM 40 MG: 20 TABLET ORAL at 20:16

## 2017-03-18 RX ADMIN — HEPARIN SODIUM 5000 UNITS: 5000 INJECTION, SOLUTION INTRAVENOUS; SUBCUTANEOUS at 20:16

## 2017-03-18 RX ADMIN — HEPARIN SODIUM 5000 UNITS: 5000 INJECTION, SOLUTION INTRAVENOUS; SUBCUTANEOUS at 13:19

## 2017-03-18 RX ADMIN — LISINOPRIL 5 MG: 5 TABLET ORAL at 13:19

## 2017-03-18 ASSESSMENT — ENCOUNTER SYMPTOMS
PALPITATIONS: 0
ORTHOPNEA: 0
COUGH: 0
CLAUDICATION: 0
SHORTNESS OF BREATH: 0
PND: 0

## 2017-03-18 ASSESSMENT — PAIN SCALES - GENERAL
PAINLEVEL_OUTOF10: 0

## 2017-03-18 NOTE — H&P
HOSPITAL MEDICINE HISTORY/ PHYSICAL    Date & Time note created:    3/17/2017   8:22 PM       Patient ID:   Name: Gagandeep Durbin  . YOB: 1960. Age: 56 y.o. male. MRN: [id number]    Admitting Attending:  Alcides Donis     PCP : FELIX Fournier    Outpatient Cardiologist: Dr. Ruelas        Chief Complaint:       Shortness of breath and chest pain    History of Present Illness:    Ramakrishna is a 56 y.o. male w/h/o stress of breath and hyperlipidemia and hypertension and NSTEMI who presents with reports of breath and chest pain. Patient has been having a stressful week. Previously he had been having some of the symptoms and he attributed to the stress. He was also drinking about 3 Red Bulls every day. He then cut off the red balls but continued to have the shortness of breath and chest pain and occasional lightheadedness. He said that things would improve when he sat down and rested but things are worsened when he was hyperventilating.    Review of Systems:    Has chest tightness, shortness of breath, some ringing in his ears  Please see HPI, all other systems were reviewed and are negative (AMA/CMS criteria)              Past Medical/ Family / Social history (PFSH):   Past Medical History   Diagnosis Date   • Shortness of breath 10/14/2014   • BMI 37.0-37.9, adult 10/14/2014   • Hyperlipidemia 10/14/2014   • Right knee pain 10/15/2014   • Dry skin 10/15/2014   • Accessory skin tags 10/15/2014   • Enlarged heart 10/15/2014   • Unspecified vitamin D deficiency 10/21/2014   • Hypertension    • Heart burn    • Indigestion    • Dental disorder      partial   • NSTEMI (non-ST elevated myocardial infarction) (CMS-HCC) 2/26/2017     Past Surgical History   Procedure Laterality Date   • Recovery  1/28/2015     Performed by [facility] at SURGERY SAME DAY [facility] ORS     Current Outpatient Medications:  No current facility-administered medications on file prior to encounter.     Current Outpatient Prescriptions  "on File Prior to Encounter   Medication Sig Dispense Refill   • Garlic 1000 MG Cap Take 1 Cap by mouth every day.     • Aug Betamethasone Dipropionate (DIPROLENE-AF) 0.05 % Cream Apply 1 g to affected area(s) 2 times a day.     • levothyroxine (SYNTHROID) 25 MCG Tab Take 1 Tab by mouth Every morning on an empty stomach. 90 Tab 0   • pravastatin (PRAVACHOL) 20 MG Tab TAKE TWO TABLETS BY MOUTH ONCE DAILY 90 Tab 1   • aspirin 81 MG tablet Take 81 mg by mouth every day.       Medication Allergy/Sensitivities:  No Known Allergies  Family History:  Family History   Problem Relation Age of Onset   • Diabetes Mother    • Cancer Father    • Heart Disease Father    • Heart Failure Father       Social History:  Social History   Substance Use Topics   • Smoking status: Former Smoker     Quit date: 05/14/2013   • Smokeless tobacco: Never Used   • Alcohol Use: Yes      Comment: 1-2 a night     #################################################################  Physical Exam:   Vitals/ General Appearance:   Weight/BMI: Body mass index is 37.33 kg/(m^2).  Blood pressure 171/95, pulse 67, temperature 36.5 °C (97.7 °F), resp. rate 18, height 1.854 m (6' 0.99\"), weight 128.3 kg (282 lb 13.6 oz), SpO2 95 %.   Filed Vitals:    03/17/17 1700 03/17/17 1801 03/17/17 1831 03/17/17 1900   BP:    171/95   Pulse: 65 59 55 67   Temp:    36.5 °C (97.7 °F)   Resp:    18   Height:       Weight:    128.3 kg (282 lb 13.6 oz)   SpO2: 95% 94% 94% 95%    Oxygen Therapy:  Pulse Oximetry: 95 %, O2 (LPM): 0, O2 Delivery: None (Room Air)    Constitutional:  well developed, obese  HENMT: Normocephalic, atraumatic, b/l ears normal, nose normal  Eyes:  EOMI, conjunctiva normal, no discharge  Neck: no tracheal deviation, supple  Cardiovascular: Bradycardic, normal rhythm, no murmurs, no rubs or gallops; no cyanosis, clubbing. Trace pitting edema bilateral lower extremities  Lungs: Respiratory effort is normal, normal breath sounds, breath sounds clear to " auscultation b/l, no rales, rhonchi or wheezing  Abdomen: soft, non-tender, no guarding or rebound. Percussion  Skin: warm, dry, no erythema, no rash  Neurologic: Alert and oriented  Psychiatric: Some anxiety or depression    #################################################################  Lab Data Review:    Objective  Recent Results (from the past 24 hour(s))   EKG (NOW)    Collection Time: 17 10:25 AM   Result Value Ref Range    Report       [facility] Emergency Dept.    Test Date:  2017  Pt Name:    DEMARIO AGUIRRE                    Department: ER  MRN:        [id number]                      Room:  Gender:     M                            Technician: [id number]  :        1960                   Requested By:ER TRIAGE PROTOCOL  Order #:    [id number]                    Reading MD: MARIAJOSE PYLE MD    Measurements  Intervals                                Axis  Rate:       60                           P:          -7  AR:         168                          QRS:        -55  QRSD:       118                          T:          221  QT:         492  QTc:        492    Interpretive Statements  SINUS RHYTHM  MULTIPLE VENTRICULAR PREMATURE COMPLEXES  LVH WITH IVCD, LAD AND SECONDARY REPOL ABNRM  Compared to ECG 2017 06:39:18  Left anterior fascicular block no longer present    Electronically Signed On 3- 11:10:25 PDT by MARIAJOSE PYLE MD     CBC WITH DIFFERENTIAL    Collection Time: 17 11:30 AM   Result Value Ref Range    WBC 7.0 4.8 - 10.8 K/uL    RBC 4.91 4.70 - 6.10 M/uL    Hemoglobin 15.7 14.0 - 18.0 g/dL    Hematocrit 45.9 42.0 - 52.0 %    MCV 93.5 81.4 - 97.8 fL    MCH 32.0 27.0 - 33.0 pg    MCHC 34.2 33.7 - 35.3 g/dL    RDW 42.7 35.9 - 50.0 fL    Platelet Count 181 164 - 446 K/uL    MPV 10.3 9.0 - 12.9 fL    Neutrophils-Polys 75.10 (H) 44.00 - 72.00 %    Lymphocytes 18.50 (L) 22.00 - 41.00 %    Monocytes 4.80 0.00 - 13.40 %    Eosinophils 0.60 0.00 - 6.90 %     Basophils 0.60 0.00 - 1.80 %    Immature Granulocytes 0.40 0.00 - 0.90 %    Nucleated RBC 0.00 /100 WBC    Neutrophils (Absolute) 5.27 1.82 - 7.42 K/uL    Lymphs (Absolute) 1.30 1.00 - 4.80 K/uL    Monos (Absolute) 0.34 0.00 - 0.85 K/uL    Eos (Absolute) 0.04 0.00 - 0.51 K/uL    Baso (Absolute) 0.04 0.00 - 0.12 K/uL    Immature Granulocytes (abs) 0.03 0.00 - 0.11 K/uL    NRBC (Absolute) 0.00 K/uL   BASIC METABOLIC PANEL    Collection Time: 03/17/17 11:30 AM   Result Value Ref Range    Sodium 137 135 - 145 mmol/L    Potassium 4.7 3.6 - 5.5 mmol/L    Chloride 108 96 - 112 mmol/L    Co2 22 20 - 33 mmol/L    Glucose 108 (H) 65 - 99 mg/dL    Bun 18 8 - 22 mg/dL    Creatinine 1.06 0.50 - 1.40 mg/dL    Calcium 9.0 8.5 - 10.5 mg/dL    Anion Gap 7.0 0.0 - 11.9   TROPONIN    Collection Time: 03/17/17 11:30 AM   Result Value Ref Range    Troponin I 0.06 (H) 0.00 - 0.04 ng/mL   BTYPE NATRIURETIC PEPTIDE    Collection Time: 03/17/17 11:30 AM   Result Value Ref Range    B Natriuretic Peptide 278 (H) 0 - 100 pg/mL   PROTHROMBIN TIME (INR)    Collection Time: 03/17/17 11:30 AM   Result Value Ref Range    PT 13.1 12.0 - 14.6 sec    INR 0.96 0.87 - 1.13   APTT    Collection Time: 03/17/17 11:30 AM   Result Value Ref Range    APTT 26.4 24.7 - 36.0 sec   TSH    Collection Time: 03/17/17 11:30 AM   Result Value Ref Range    TSH 2.720 0.300 - 3.700 uIU/mL   FREE THYROXINE    Collection Time: 03/17/17 11:30 AM   Result Value Ref Range    Free T-4 0.79 0.53 - 1.43 ng/dL   D-DIMER (only helpful in low pre-test probability wells critieria. Do not order if patient ruled out by PERC criteria. See Weblinks at top of Labs section)    Collection Time: 03/17/17 11:30 AM   Result Value Ref Range    D-Dimer Screen <200 <250 ng/mL(D-DU)   ESTIMATED GFR    Collection Time: 03/17/17 11:30 AM   Result Value Ref Range    GFR If African American >60 >60 mL/min/1.73 m 2    GFR If Non African American >60 >60 mL/min/1.73 m 2   MAGNESIUM    Collection Time:  03/17/17 11:30 AM   Result Value Ref Range    Magnesium 2.0 1.5 - 2.5 mg/dL   PHOSPHORUS    Collection Time: 03/17/17 11:30 AM   Result Value Ref Range    Phosphorus 2.7 2.5 - 4.5 mg/dL   TROPONIN    Collection Time: 03/17/17  7:01 PM   Result Value Ref Range    Troponin I 0.07 (H) 0.00 - 0.04 ng/mL       (click the triangle to expand results)  My interpretation of lab results:   Trops 0.07, , TSH 2.72  Imaging/Procedures Review:    DX-CHEST-PORTABLE (1 VIEW)   Final Result      1.  Hypoinflation without other evidence for acute cardiopulmonary disease.   2.  Stable cardiomegaly.      ECHOCARDIOGRAM-COMP W/ CONT    (Results Pending)     EKG:   per my independant read:  QTc:492, HR: 60, Normal Sinus Rhythm with PVCs, no ST/T changes    Assessment and Plan:      1. Chest pain and charts of breath  - Differential includes cardiac, valvular heart disease, anxiety  - Cardiologist Dr. Ibrahim consulted  - Echo with contrast pending  - May need cardiac cath  2. Multiple PVCs  - Monitor on telemetry  - Magnesium and TFTs pending  3. Hypertension  - Lisinopril and diltiazem per cardiology  4. Elevated troponin  - No chest pain  - Likely demand ischemia  - Trend troponins and monitor on telemetry   5. Hypothyroidism  - Continue levothyroxine  6. Prophylaxis: sc heparin   7. Code: Full code per patient

## 2017-03-18 NOTE — CARE PLAN
Problem: Communication  Goal: The ability to communicate needs accurately and effectively will improve  Pt is able to verbally communicate in English    Problem: Safety  Goal: Will remain free from injury  Pt is free from injury at this time. Fall precautions in place.  Treaded socks on, personal belongings within reach.  Pt is up self. Refused bed alarm.  Educated on fall precautions.  Received verbal understanding.    Problem: Infection  Goal: Will remain free from infection  Pt remains free from s/s of infection at this time.      Problem: Venous Thromboembolism (VTW)/Deep Vein Thrombosis (DVT) Prevention:  Goal: Patient will participate in Venous Thrombosis (VTE)/Deep Vein Thrombosis (DVT)Prevention Measures  Pt receiving Heparin subq every 8 hours.  Refused SCDs as they limit his movement.    Problem: Bowel/Gastric:  Goal: Normal bowel function is maintained or improved  Pt reports normal bowel function at this time.

## 2017-03-18 NOTE — PROGRESS NOTES
"Interval History:  56M with recurrent orthostatic symptoms presents one month after recent admission for orthostasis and NSTEMI, not seen by cardiology since 2015 (Dr. Ruelas). Has a history of moderate AS by KIARRA and abnormal MPI in 2014 without any invasive evaluation in the past. Feeling well until several weeks ago when noted orthostatic symptoms and paroxysmal substernal chest pressure and dyspnea lasting 30s-1 minute. Has not felt better since recent DC and had several stacked episodes today resulting in his presentation to ER. Former smoker. Drinks 3 beers/week.    3/18: No CV events. Bigem on tele. NO further NSVT. Feeling well.    Physical Exam   Blood pressure 142/81, pulse 70, temperature 36.4 °C (97.5 °F), resp. rate 18, height 1.854 m (6' 0.99\"), weight 128.3 kg (282 lb 13.6 oz), SpO2 93 %.    Constitutional: Obese. Appears well-developed.    HENT: Normocephalic and atraumatic. No scleral icterus.    Neck: No JVD present.    Cardiovascular: Normal rate. Frequent ectopy. Exam reveals no gallop and no friction rub. 3/6 SUNIL LUSB heard.    Pulmonary/Chest: CTAB    Abdominal: S/NT/ND BS+   Musculoskeletal:  Pulses present. No atrophy. Strength normal.    Extremities: Exhibits no edema. No clubbing or cyanosis. Normal cap refill.  Skin: Skin is warm and dry.    Neuro: Non-focal, CN 2-12 intact grossly  ROS: As HPI other reviewed and negative     No intake or output data in the 24 hours ending 03/18/17 1111    Recent Labs      03/17/17   1130  03/18/17   0036   WBC  7.0  6.0   RBC  4.91  4.66*   HEMOGLOBIN  15.7  14.8   HEMATOCRIT  45.9  43.2   MCV  93.5  92.7   MCH  32.0  31.8   MCHC  34.2  34.3   RDW  42.7  42.4   PLATELETCT  181  159*   MPV  10.3  10.6     Recent Labs      03/17/17   1130  03/18/17   0036   SODIUM  137  136   POTASSIUM  4.7  4.0   CHLORIDE  108  107   CO2  22  21   GLUCOSE  108*  118*   BUN  18  18   CREATININE  1.06  1.22   CALCIUM  9.0  8.3*     Recent Labs      03/17/17   1130   APTT  " 26.4   INR  0.96     Recent Labs      03/17/17   1130   BNPBTYPENAT  278*     Recent Labs      03/17/17   1130  03/17/17   1901  03/18/17   0036   TROPONINI  0.06*  0.07*  0.06*   BNPBTYPENAT  278*   --    --            No current facility-administered medications on file prior to encounter.     Current Outpatient Prescriptions on File Prior to Encounter   Medication Sig Dispense Refill   • Garlic 1000 MG Cap Take 1 Cap by mouth every day.     • Aug Betamethasone Dipropionate (DIPROLENE-AF) 0.05 % Cream Apply 1 g to affected area(s) 2 times a day.     • levothyroxine (SYNTHROID) 25 MCG Tab Take 1 Tab by mouth Every morning on an empty stomach. 90 Tab 0   • pravastatin (PRAVACHOL) 20 MG Tab TAKE TWO TABLETS BY MOUTH ONCE DAILY 90 Tab 1   • aspirin 81 MG tablet Take 81 mg by mouth every day.         Current Facility-Administered Medications   Medication Dose Frequency Provider Last Rate Last Dose   • pravastatin (PRAVACHOL) tablet 40 mg  40 mg Q EVENING Alcides Donis M.D.   40 mg at 03/17/17 2029   • levothyroxine (SYNTHROID) tablet 25 mcg  25 mcg AM ES Alcides Donis M.D.   25 mcg at 03/18/17 0520   • aspirin (ASA) chewable tab 81 mg  81 mg DAILY Alcides Donis M.D.   81 mg at 03/18/17 0926   • senna-docusate (PERICOLACE or SENOKOT S) 8.6-50 MG per tablet 2 Tab  2 Tab BID Alcides Donis M.D.   2 Tab at 03/17/17 2029    And   • polyethylene glycol/lytes (MIRALAX) PACKET 1 Packet  1 Packet QDAY PRN Alcides Donis M.D.        And   • magnesium hydroxide (MILK OF MAGNESIA) suspension 30 mL  30 mL QDAY PRN Alcides Donis M.D.        And   • bisacodyl (DULCOLAX) suppository 10 mg  10 mg QDAY PRN Alcides Donis M.D.       • heparin injection 5,000 Units  5,000 Units Q8HRS Alcides Donis M.D.   5,000 Units at 03/18/17 0520   • acetaminophen (TYLENOL) tablet 650 mg  650 mg Q6HRS PRN Alcides Donis M.D.       • ondansetron (ZOFRAN) syringe/vial injection 4 mg  4 mg Q4HRS PRN Alcides Donis M.D.       • ondansetron (ZOFRAN ODT)  dispertab 4 mg  4 mg Q4HRS PRN Alcides Donis M.D.       • promethazine (PHENERGAN) tablet 12.5-25 mg  12.5-25 mg Q4HRS PRN Alcides Donis M.D.       • promethazine (PHENERGAN) suppository 12.5-25 mg  12.5-25 mg Q4HRS PRN Alcides Donis M.D.       • prochlorperazine (COMPAZINE) injection 5-10 mg  5-10 mg Q4HRS PRVENUS Donis M.D.         Last reviewed on 3/17/2017  1:23 PM by Hafsa Sanchez  Medications reviewed    Imaging reviewed    ECHO(pnd):      Impressions:  1. Atypical chest pain  2. Episodic dyspnea  3. Frequent PVCs with NSVT in ER  4. Mild-moderate mixed aortic disease as of 2014  5. H/O abnormal MPI  6. Obesity  7. HTN untreated    Recommendations:  His las echo was misread as not having any abnormalities, as it was of poor quality. However he has known valvular heart disease by history imaging and exam. Frequent ectopy with NSVT may be responsible for his symptoms, as could progression of AS or CAD given his abnormal stress from 2014. Also his untreated HTN could be at cause.    1. Echo with contrast pending. If unclear, will proceed to KIARRA or invasive valve study  2. Unfortunately no medication changes were implemented yesterday. I recommend Lisinopril 5mg daily, Diltiazem TID. Continue aspirin 81mg  4. Depending on above, likely coronary angiography this admission. It would of course be beneficial to know the true status of his aortic valve prior to proceeding with cath if possible.

## 2017-03-18 NOTE — PROGRESS NOTES
Received report from night shift RN. Assumed care of patient. Pt assessed and stable. VSS. Patient reports 0/10 pain at this time.  Discussed plan of care for day with patient and received verbal understanding. Call light within reach, bed in low position.  Educated pt re fall precautions and received verbal understanding.  However, pt continues to refuse bed alarms.  Pt is alert, oriented, steady on feet and calls appropriately.

## 2017-03-19 LAB
ANION GAP SERPL CALC-SCNC: 6 MMOL/L (ref 0–11.9)
BUN SERPL-MCNC: 18 MG/DL (ref 8–22)
CALCIUM SERPL-MCNC: 8.7 MG/DL (ref 8.5–10.5)
CHLORIDE SERPL-SCNC: 108 MMOL/L (ref 96–112)
CO2 SERPL-SCNC: 23 MMOL/L (ref 20–33)
CREAT SERPL-MCNC: 1.07 MG/DL (ref 0.5–1.4)
ERYTHROCYTE [DISTWIDTH] IN BLOOD BY AUTOMATED COUNT: 42.1 FL (ref 35.9–50)
GFR SERPL CREATININE-BSD FRML MDRD: >60 ML/MIN/1.73 M 2
GLUCOSE SERPL-MCNC: 115 MG/DL (ref 65–99)
HCT VFR BLD AUTO: 45.4 % (ref 42–52)
HGB BLD-MCNC: 15.7 G/DL (ref 14–18)
MCH RBC QN AUTO: 31.9 PG (ref 27–33)
MCHC RBC AUTO-ENTMCNC: 34.6 G/DL (ref 33.7–35.3)
MCV RBC AUTO: 92.3 FL (ref 81.4–97.8)
PLATELET # BLD AUTO: 171 K/UL (ref 164–446)
PMV BLD AUTO: 10.4 FL (ref 9–12.9)
POTASSIUM SERPL-SCNC: 4.4 MMOL/L (ref 3.6–5.5)
RBC # BLD AUTO: 4.92 M/UL (ref 4.7–6.1)
SODIUM SERPL-SCNC: 137 MMOL/L (ref 135–145)
WBC # BLD AUTO: 5.4 K/UL (ref 4.8–10.8)

## 2017-03-19 PROCEDURE — A9270 NON-COVERED ITEM OR SERVICE: HCPCS | Performed by: INTERNAL MEDICINE

## 2017-03-19 PROCEDURE — 700102 HCHG RX REV CODE 250 W/ 637 OVERRIDE(OP): Performed by: INTERNAL MEDICINE

## 2017-03-19 PROCEDURE — 700111 HCHG RX REV CODE 636 W/ 250 OVERRIDE (IP)

## 2017-03-19 PROCEDURE — 99153 MOD SED SAME PHYS/QHP EA: CPT

## 2017-03-19 PROCEDURE — 360979 HCHG DIAGNOSTIC CATH

## 2017-03-19 PROCEDURE — C1760 CLOSURE DEV, VASC: HCPCS

## 2017-03-19 PROCEDURE — 99152 MOD SED SAME PHYS/QHP 5/>YRS: CPT

## 2017-03-19 PROCEDURE — G0378 HOSPITAL OBSERVATION PER HR: HCPCS

## 2017-03-19 PROCEDURE — 4A023N8 MEASUREMENT OF CARDIAC SAMPLING AND PRESSURE, BILATERAL, PERCUTANEOUS APPROACH: ICD-10-PCS | Performed by: INTERNAL MEDICINE

## 2017-03-19 PROCEDURE — 700102 HCHG RX REV CODE 250 W/ 637 OVERRIDE(OP): Performed by: HOSPITALIST

## 2017-03-19 PROCEDURE — B2111ZZ FLUOROSCOPY OF MULTIPLE CORONARY ARTERIES USING LOW OSMOLAR CONTRAST: ICD-10-PCS | Performed by: INTERNAL MEDICINE

## 2017-03-19 PROCEDURE — 85027 COMPLETE CBC AUTOMATED: CPT

## 2017-03-19 PROCEDURE — 99226 PR SUBSEQUENT OBSERVATION CARE,LEVEL III: CPT | Performed by: INTERNAL MEDICINE

## 2017-03-19 PROCEDURE — 304952 HCHG R 2 PADS

## 2017-03-19 PROCEDURE — C1769 GUIDE WIRE: HCPCS

## 2017-03-19 PROCEDURE — C1894 INTRO/SHEATH, NON-LASER: HCPCS

## 2017-03-19 PROCEDURE — 360980 HCHG SINGLE TRANSDUCER

## 2017-03-19 PROCEDURE — 700111 HCHG RX REV CODE 636 W/ 250 OVERRIDE (IP): Performed by: HOSPITALIST

## 2017-03-19 PROCEDURE — 302870 HCHG CATH DUAL LUMEN PIGTAIL

## 2017-03-19 PROCEDURE — 80048 BASIC METABOLIC PNL TOTAL CA: CPT

## 2017-03-19 PROCEDURE — 93460 R&L HRT ART/VENTRICLE ANGIO: CPT

## 2017-03-19 PROCEDURE — 36415 COLL VENOUS BLD VENIPUNCTURE: CPT

## 2017-03-19 PROCEDURE — 700117 HCHG RX CONTRAST REV CODE 255: Performed by: INTERNAL MEDICINE

## 2017-03-19 PROCEDURE — B2151ZZ FLUOROSCOPY OF LEFT HEART USING LOW OSMOLAR CONTRAST: ICD-10-PCS | Performed by: INTERNAL MEDICINE

## 2017-03-19 PROCEDURE — A9270 NON-COVERED ITEM OR SERVICE: HCPCS | Performed by: HOSPITALIST

## 2017-03-19 PROCEDURE — 700101 HCHG RX REV CODE 250

## 2017-03-19 PROCEDURE — 305478 HCHG 7.5FR EDWARDS SWAN/THERMO

## 2017-03-19 RX ORDER — LIDOCAINE HYDROCHLORIDE 20 MG/ML
INJECTION, SOLUTION INFILTRATION; PERINEURAL
Status: COMPLETED
Start: 2017-03-19 | End: 2017-03-19

## 2017-03-19 RX ORDER — HEPARIN SODIUM,PORCINE 1000/ML
VIAL (ML) INJECTION
Status: COMPLETED
Start: 2017-03-19 | End: 2017-03-19

## 2017-03-19 RX ORDER — BETAMETHASONE DIPROPIONATE 0.5 MG/G
CREAM TOPICAL 2 TIMES DAILY
Status: DISCONTINUED | OUTPATIENT
Start: 2017-03-19 | End: 2017-03-23 | Stop reason: HOSPADM

## 2017-03-19 RX ORDER — MIDAZOLAM HYDROCHLORIDE 1 MG/ML
INJECTION INTRAMUSCULAR; INTRAVENOUS
Status: COMPLETED
Start: 2017-03-19 | End: 2017-03-19

## 2017-03-19 RX ADMIN — DILTIAZEM HYDROCHLORIDE 30 MG: 30 TABLET, FILM COATED ORAL at 01:26

## 2017-03-19 RX ADMIN — PRAVASTATIN SODIUM 40 MG: 20 TABLET ORAL at 22:07

## 2017-03-19 RX ADMIN — DILTIAZEM HYDROCHLORIDE 30 MG: 30 TABLET, FILM COATED ORAL at 13:29

## 2017-03-19 RX ADMIN — MIDAZOLAM 3 MG: 1 INJECTION INTRAMUSCULAR; INTRAVENOUS at 12:52

## 2017-03-19 RX ADMIN — IOHEXOL 83 ML: 350 INJECTION, SOLUTION INTRAVENOUS at 12:51

## 2017-03-19 RX ADMIN — HEPARIN SODIUM 5000 UNITS: 5000 INJECTION, SOLUTION INTRAVENOUS; SUBCUTANEOUS at 22:07

## 2017-03-19 RX ADMIN — FENTANYL CITRATE 100 MCG: 50 INJECTION, SOLUTION INTRAMUSCULAR; INTRAVENOUS at 12:52

## 2017-03-19 RX ADMIN — LISINOPRIL 5 MG: 5 TABLET ORAL at 08:34

## 2017-03-19 RX ADMIN — LIDOCAINE HYDROCHLORIDE: 20 INJECTION, SOLUTION INFILTRATION; PERINEURAL at 11:57

## 2017-03-19 RX ADMIN — NITROGLYCERIN 10 ML: 20 INJECTION INTRAVENOUS at 11:58

## 2017-03-19 RX ADMIN — HEPARIN SODIUM: 1000 INJECTION, SOLUTION INTRAVENOUS; SUBCUTANEOUS at 11:57

## 2017-03-19 RX ADMIN — Medication 2 TABLET: at 22:07

## 2017-03-19 RX ADMIN — ASPIRIN 81 MG: 81 TABLET, CHEWABLE ORAL at 08:34

## 2017-03-19 RX ADMIN — DILTIAZEM HYDROCHLORIDE 30 MG: 30 TABLET, FILM COATED ORAL at 17:27

## 2017-03-19 RX ADMIN — BETAMETHASONE DIPROPIONATE: 0.5 CREAM TOPICAL at 22:08

## 2017-03-19 RX ADMIN — HEPARIN SODIUM 2000 UNITS: 200 INJECTION, SOLUTION INTRAVENOUS at 11:57

## 2017-03-19 RX ADMIN — LEVOTHYROXINE SODIUM 25 MCG: 25 TABLET ORAL at 06:33

## 2017-03-19 RX ADMIN — BETAMETHASONE DIPROPIONATE: 0.5 CREAM TOPICAL at 13:29

## 2017-03-19 ASSESSMENT — PAIN SCALES - GENERAL
PAINLEVEL_OUTOF10: 0

## 2017-03-19 ASSESSMENT — ENCOUNTER SYMPTOMS
SHORTNESS OF BREATH: 0
PALPITATIONS: 0
CLAUDICATION: 0
PND: 0
ORTHOPNEA: 0
COUGH: 0

## 2017-03-19 NOTE — PROGRESS NOTES
Monitor Summary:  SB-SR 58-79; frequent PVCs; occasional couplets; rare triplets (multifocal triplet); frequent bigemy; .18/.10/.40

## 2017-03-19 NOTE — PROGRESS NOTES
Paged Dr. Haskins for Echo results and patient's heart rate in the low 50's and high 40's.  Sustained at 49 for 1 minute.  HR now 54-55.

## 2017-03-19 NOTE — CATH LAB
Immediate Post-Operative Note      PreOp Diagnosis:   1. Episodic Dyspnea  2. Abnormal Stress  3. AS    PostOp Diagnosis:   1. No CAD  2. LVEF 30-35%  3. FIFI calculations pending    Procedure(s) :  Coronary Angiography, Left Heart Catheterization, Left Ventriculography, RHC, Valve study, Angioseal    Surgeon(s):  Christopher Ibrahim M.D.    Type of Anesthesia: Moderate Sedation    Specimen: None    Complications: none apparent        Christopher Ibrahim M.D.  3/19/2017 1:12 PM

## 2017-03-19 NOTE — PROGRESS NOTES
Received call from monitor room that patient's heart rate in the 40's non-sustaining.  Held 1800 Cardizem per MAR and MD order.

## 2017-03-19 NOTE — CARE PLAN
Problem: Safety  Goal: Will remain free from injury  Outcome: PROGRESSING AS EXPECTED  Patient's bed is in low position, treaded socks are on, bed alarm is on, upper bed rails are up, and call light is within reach    Problem: Venous Thromboembolism (VTW)/Deep Vein Thrombosis (DVT) Prevention:  Goal: Patient will participate in Venous Thrombosis (VTE)/Deep Vein Thrombosis (DVT)Prevention Measures  Outcome: PROGRESSING AS EXPECTED  Pharm prophylaxis in use, early ambulation encouraged

## 2017-03-19 NOTE — PROCEDURES
DATE OF SERVICE:  03/19/2017    PROCEDURES PERFORMED:  Selective coronary angiography of native vessels, left   heart catheterization, right heart catheterization, aortic valve hemodynamic   evaluation, and Angio-Seal closure of the right femoral artery.    PREOPERATIVE DIAGNOSES:  1.  Intermittent dyspnea.  2.  Intermittent dizziness.  3.  Aortic stenosis of unclear severity due to poor noninvasive images,   previously moderate.  4.  Obesity.  5.  Poorly-controlled hypertension.    POSTOPERATIVE DIAGNOSES:  1.  At least moderate aortic stenosis with a simultaneous mean gradient of 24   mmHg and calculated aortic valve area of 0.71.  2.  Reduced left ventricular ejection fraction, estimated at 40%.  3.  No significant epicardial coronary artery disease.  4.  Reduced thermodilution cardiac output of 2.53 with an index of 1.02.  5.  Left ventricular end-diastolic pressure 28.    DESCRIPTION OF PROCEDURE:  The  risks, benefits, and alternatives of cardiac   catheterization including, but not limited to death, stroke, MI, urgent bypass   surgery, renovascular complications, bleeding and infection were discussed   with the patient who agreed to proceed both verbally and via written consent.    The patient was transferred to catheterization laboratory in fasting state   from the outpatient setting.  The right wrist and right groin were prepped and   draped in usual sterile fashion.  The bilateral groins were prepped and   draped using sterile fashion using ChloraPrep solution.  The right femoral   area was fluoroscopic localized in size at 18 mL of 1% Xylocaine, and the   right femoral artery was entered with a single front-wall puncture.  A   6-Nigerian side arm sheath was placed.  Then using modified Seldinger technique   the right femoral vein was entered and an 8-Nigerian sidearm sheath was placed.    Through the venous sheath, an 8-Nigerian balloon tipped [place]-Jack catheter was   advanced through the right heart and into  the pulmonary circulation without   difficulty and routine hemodynamics were obtained.  The [place]-Jack catheter was   then removed.  It was then turned to the arterial system.  A JL4 and JR4   diagnostic catheters were advanced in succession over the guidewire for   cannulation of coronary arteries.  Contrast was administered and multiple   images obtained in standard planes.  A JR4 catheter was then used to cross the   aortic valve.  Following this, over an exchange length wire, the JR4 was   removed and a Rui dual-lumen pigtail catheter was advanced into the LV   cavity, meticulously flushed, and both transducers were zeroed meticulously.    Simultaneous aortic and left ventricular pressures were obtained.  Contrast   was administered at 8 mL per second for 24 mL for left ventriculogram.    Pullback showed no drift of this system.  Following this, all catheters and   guidewires were removed and Angio-Seal device was deployed in the right   femoral artery in the usual fashion followed by pull and manual hold of the   venous sheath.  The patient tolerated the procedure well and left the   catheterization laboratory in stable condition.    FINDINGS:  I.  HEMODYNAMICS:  1.  Aortic pressure 131/73, mean 95, heart rate 57.  2.  Left ventricular pressure 155 and LVEDP 28.  3.  PA pressure 32/15 with a mean PA 20.  4.  RV pressure 31/3 with RVEDP 8.  5.  Right atrial pressure 8.  6.  Pulmonary capillary wedge pressure 10 with a V-wave of 12.  7.  Thermodilution cardiac output 2.53 with an index of 1.02.  8.  Mean aortic valve gradient 23.56 with a calculated valve area of 0.71.    II.  LEFT VENTRICULOGRAM:  The LVEF in the MURRAY projection is estimated at 40%.    III.  SELECTIVE CORONARY ANGIOGRAPHY OF NATIVE VESSELS:  1.  LEFT MAIN:  Large vessel, giving rise to LAD and circumflex.  No coronary   artery disease.  2.  Left circumflex is a large codominant system with a large obtuse marginal.    No coronary artery  disease.  3.  Left anterior descending is a very large system with usual complement of   diagonals, which wraps around the apex.  No coronary artery disease.  4.  RIGHT CORONARY ARTERY:  Moderate sized codominant vessel.  No coronary   artery disease.    CONCLUSIONS:  1.  At least moderate aortic stenosis with a simultaneous mean gradient of 24   mmHg and calculated aortic valve area of 0.71.  2.  Reduced left ventricular ejection fraction, estimated at 40%.  3.  No significant epicardial coronary artery disease.  4.  Reduced thermodilution cardiac output of 2.53 with an index of 1.02.  5.  Left ventricular end-diastolic pressure 28.       ____________________________________     MD NYDIA Hall / STARR    DD:  03/19/2017 15:19:49  DT:  03/19/2017 16:51:37    D#:  [id number]  Job#:  [id number]

## 2017-03-19 NOTE — PROGRESS NOTES
Monitor summary: .20/.08/.38 SB/SR 53-71 down to 48 unsustained with frequent PVCs, couplets, and bigem.

## 2017-03-19 NOTE — PROGRESS NOTES
Cardiology Progress Note               Author: Sanjay Adames Date & Time created: 3/19/2017  8:17 AM     Interval History:        Consultation for atypical chest pain, dyspnea, hypertension (170/80) frequent PVCs in emergency room            Admitted with atypical chest pain and dyspnea recent discharge a month ago (- )          History of  hypertension, hypothyroid,  history of abnormal MPI, obesity, mild to moderate mixed aortic disease (), chronic asymptomatic sinus bradycardia    Labs reviewed  NA, K, CR stable  Troponin peaked at 0.07  BNP = 278    BP = 126/78  HR = 63  Review of Systems   Respiratory: Negative for cough and shortness of breath.    Cardiovascular: Negative for chest pain, palpitations, orthopnea, claudication, leg swelling and PND.       Physical Exam   Constitutional: He is oriented to person, place, and time.   HENT:   Head: Normocephalic.   Eyes: Conjunctivae are normal.   Neck: No JVD present. No thyromegaly present.   Cardiovascular: Regular rhythm.  Bradycardia present.    Pulses:       Carotid pulses are 2+ on the right side, and 2+ on the left side.       Radial pulses are 2+ on the right side, and 2+ on the left side.        Posterior tibial pulses are 2+ on the right side, and 2+ on the left side.   Pulmonary/Chest: He has no wheezes.   Musculoskeletal: He exhibits no edema.   Neurological: He is oriented to person, place, and time.   Skin: Skin is warm and dry.       Hemodynamics:  Temp (24hrs), Av.4 °C (97.6 °F), Min:36.2 °C (97.1 °F), Max:36.8 °C (98.2 °F)  Temperature: 36.3 °C (97.4 °F)  Pulse  Av.7  Min: 35  Max: 71   Blood Pressure: 120/89 mmHg     Respiratory:    Respiration: 18, Pulse Oximetry: 94 %        RUL Breath Sounds: Diminished, RML Breath Sounds: Diminished, RLL Breath Sounds: Diminished, YESSICA Breath Sounds: Diminished, LLL Breath Sounds: Diminished  Fluids:     Weight: (!) 128.3 kg (282 lb 13.6 oz)  GI/Nutrition:  No orders of the defined types  were placed in this encounter.     Lab Results:  Recent Labs      03/17/17   1130  03/18/17   0036   WBC  7.0  6.0   RBC  4.91  4.66*   HEMOGLOBIN  15.7  14.8   HEMATOCRIT  45.9  43.2   MCV  93.5  92.7   MCH  32.0  31.8   MCHC  34.2  34.3   RDW  42.7  42.4   PLATELETCT  181  159*   MPV  10.3  10.6     Recent Labs      03/17/17   1130  03/18/17   0036   SODIUM  137  136   POTASSIUM  4.7  4.0   CHLORIDE  108  107   CO2  22  21   GLUCOSE  108*  118*   BUN  18  18   CREATININE  1.06  1.22   CALCIUM  9.0  8.3*     Recent Labs      03/17/17   1130   APTT  26.4   INR  0.96     Recent Labs      03/17/17   1130   BNPBTYPENAT  278*     Recent Labs      03/17/17   1130  03/17/17   1901  03/18/17   0036   TROPONINI  0.06*  0.07*  0.06*   BNPBTYPENAT  278*   --    --              Medical Decision Making, by Problem:  Active Hospital Problems    Diagnosis   • Bradycardia [R00.1]   • PVC (premature ventricular contraction) [I49.3]   • Chest pain, rule out acute myocardial infarction [R07.9]       Plan:   NSR-SB frequent PVCs, Diltiazem 30 q6h ( was held x2 secondary to low HR, 50-60 )  Trop peaked at 0.07, on ASA, Statin   NO chest pain  Mild dyspnea  Obesity  Hypertension,  BP is better controlled ( on Lisinopril )  Aortic valve disease, echo noted ( will D/W DR Ibrahim )  Currently NPO for ?  Medications reviewed and Labs reviewed

## 2017-03-19 NOTE — PROGRESS NOTES
Received report from previous nurse regarding prior 12 hours.  POC reviewed with pt, white board updated, pt verbalized understanding, call light within reach.  Pt tolerated evening meds. Pt will be NPO at midnight.

## 2017-03-19 NOTE — PROGRESS NOTES
HOSPITALIST PROGRESS NOTE (SOAP)   Date of Service  3/18  CHIEF COMPLAINT  56 y.o.yo male presented 3/17/2017 with Shortness of Breath    SUBJECTIVE  Not complaining of chest pain this morning. Has swelling legs.  PHYSICAL EXAM  Pulse: 60 70 63 54   Temp: 36.2 °C (97.1 °F) 36.4 °C (97.5 °F) 36.4 °C (97.5 °F) 36.2 °C (97.1 °F)   Resp: 18 18 20 18   Height:       Weight:       SpO2: 96% 93% 95% 94%     No intake or output data in the 24 hours ending 03/18/17 1725Vitals reviewed  General/Constitutional: No acute distress.   Head: Normocephalic, atraumatic  ENT: Oral mucosa is moist. No obvious pharyngeal exudates  Eyes: Pink conjunctiva, no scleral icterus  Neck: Supple, no lymphadenopathy  Cardiovascular: Normal rate and regular rhythm. S1,2 noted. Slight murmur noted.  Pulmonary: Clear to auscultation bilaterally. No wheezes, rales or rhonchi  Abdominal: Soft, nontender, not distended, bowel sounds normoactive.  Musculoskeletal: No tenderness to palpation of chest wall. Bilateral lower extremity edema.  Neurologic: Grossly nonfocal, moving all extremities.  Genitourinary: No gross hematuria  Skin: No obvious rash.  Psychiatric: Pleasant, cooperative.    LABS  Lab Results   Component Value Date/Time    WBC 6.0 03/18/2017 12:36 AM    RBC 4.66* 03/18/2017 12:36 AM    HEMOGLOBIN 14.8 03/18/2017 12:36 AM    HEMATOCRIT 43.2 03/18/2017 12:36 AM    MCV 92.7 03/18/2017 12:36 AM    MCH 31.8 03/18/2017 12:36 AM    MCHC 34.3 03/18/2017 12:36 AM    MPV 10.6 03/18/2017 12:36 AM    NEUTROPHILS-POLYS 62.60 03/18/2017 12:36 AM    LYMPHOCYTES 28.80 03/18/2017 12:36 AM    MONOCYTES 4.80 03/18/2017 12:36 AM    EOSINOPHILS 2.70 03/18/2017 12:36 AM    BASOPHILS 0.80 03/18/2017 12:36 AM      Lab Results   Component Value Date/Time    SODIUM 136 03/18/2017 12:36 AM    POTASSIUM 4.0 03/18/2017 12:36 AM    CHLORIDE 107 03/18/2017 12:36 AM    CO2 21 03/18/2017 12:36 AM    GLUCOSE 118* 03/18/2017 12:36 AM    BUN 18 03/18/2017 12:36 AM     CREATININE 1.22 03/18/2017 12:36 AM      Lab Results   Component Value Date/Time    PT 13.1 03/17/2017 11:30 AM    INR 0.96 03/17/2017 11:30 AM      No results found for: BLOODCULTU, BLDCULT, BCHOLD  Labs reviewed  Imaging reviewed  Medications reviewed  ASSESSMENT and PLAN  Active Problems:    Chest pain    NSVT    PVC (premature ventricular contraction)    Hypertension    Hyperlipidemia  On lisinopril, diltiazem, aspirin, pravastatin. Cardiology planning cardiac catheterization. Echocardiogram pending. Telemetry.     Pharmacologic DVT prophylaxis    I spent 36 minutes, reviewing the chart, notes, vitals, labs, imaging, ordering labs, evaluating Gagandeep Durbin Jr. for assessment, enacting the plan above and writing this note. 50% of the time was spent in counseling Gagandeep Durbin Jr. and fanswering questions. All of the above were discussed.

## 2017-03-20 PROBLEM — I47.29 NON-SUSTAINED VENTRICULAR TACHYCARDIA (HCC): Status: ACTIVE | Noted: 2017-03-17

## 2017-03-20 PROBLEM — I42.9 CARDIOMYOPATHY (HCC): Status: ACTIVE | Noted: 2017-03-20

## 2017-03-20 PROBLEM — Q23.0 AORTIC STENOSIS DUE TO BICUSPID AORTIC VALVE: Status: ACTIVE | Noted: 2017-03-17

## 2017-03-20 PROBLEM — Q23.81 AORTIC STENOSIS DUE TO BICUSPID AORTIC VALVE: Status: ACTIVE | Noted: 2017-03-17

## 2017-03-20 PROBLEM — Q23.1 AORTIC STENOSIS DUE TO BICUSPID AORTIC VALVE: Status: ACTIVE | Noted: 2017-03-17

## 2017-03-20 PROBLEM — I50.9 ACUTE ON CHRONIC HEART FAILURE (HCC): Status: ACTIVE | Noted: 2017-03-20

## 2017-03-20 PROBLEM — I35.0 AORTIC STENOSIS DUE TO BICUSPID AORTIC VALVE: Status: ACTIVE | Noted: 2017-03-17

## 2017-03-20 PROBLEM — I49.3 PVC (PREMATURE VENTRICULAR CONTRACTION): Status: ACTIVE | Noted: 2017-03-20

## 2017-03-20 PROBLEM — R07.9 CHEST PAIN, RULE OUT ACUTE MYOCARDIAL INFARCTION: Status: ACTIVE | Noted: 2017-03-20

## 2017-03-20 LAB
ANION GAP SERPL CALC-SCNC: 6 MMOL/L (ref 0–11.9)
BUN SERPL-MCNC: 18 MG/DL (ref 8–22)
CALCIUM SERPL-MCNC: 9.2 MG/DL (ref 8.5–10.5)
CHLORIDE SERPL-SCNC: 104 MMOL/L (ref 96–112)
CO2 SERPL-SCNC: 27 MMOL/L (ref 20–33)
CREAT SERPL-MCNC: 1.18 MG/DL (ref 0.5–1.4)
ERYTHROCYTE [DISTWIDTH] IN BLOOD BY AUTOMATED COUNT: 42.7 FL (ref 35.9–50)
GFR SERPL CREATININE-BSD FRML MDRD: >60 ML/MIN/1.73 M 2
GLUCOSE SERPL-MCNC: 89 MG/DL (ref 65–99)
HCT VFR BLD AUTO: 47 % (ref 42–52)
HGB BLD-MCNC: 15.6 G/DL (ref 14–18)
LV EJECT FRACT  99904: 50
MCH RBC QN AUTO: 31.3 PG (ref 27–33)
MCHC RBC AUTO-ENTMCNC: 33.2 G/DL (ref 33.7–35.3)
MCV RBC AUTO: 94.4 FL (ref 81.4–97.8)
PLATELET # BLD AUTO: 172 K/UL (ref 164–446)
PMV BLD AUTO: 10.5 FL (ref 9–12.9)
POTASSIUM SERPL-SCNC: 4.1 MMOL/L (ref 3.6–5.5)
RBC # BLD AUTO: 4.98 M/UL (ref 4.7–6.1)
SODIUM SERPL-SCNC: 137 MMOL/L (ref 135–145)
WBC # BLD AUTO: 6.9 K/UL (ref 4.8–10.8)

## 2017-03-20 PROCEDURE — A9270 NON-COVERED ITEM OR SERVICE: HCPCS | Performed by: INTERNAL MEDICINE

## 2017-03-20 PROCEDURE — 93350 STRESS TTE ONLY: CPT

## 2017-03-20 PROCEDURE — 700102 HCHG RX REV CODE 250 W/ 637 OVERRIDE(OP): Performed by: INTERNAL MEDICINE

## 2017-03-20 PROCEDURE — 770020 HCHG ROOM/CARE - TELE (206)

## 2017-03-20 PROCEDURE — 700102 HCHG RX REV CODE 250 W/ 637 OVERRIDE(OP): Performed by: HOSPITALIST

## 2017-03-20 PROCEDURE — 700111 HCHG RX REV CODE 636 W/ 250 OVERRIDE (IP): Performed by: HOSPITALIST

## 2017-03-20 PROCEDURE — A9270 NON-COVERED ITEM OR SERVICE: HCPCS | Performed by: HOSPITALIST

## 2017-03-20 PROCEDURE — 85027 COMPLETE CBC AUTOMATED: CPT

## 2017-03-20 PROCEDURE — 80048 BASIC METABOLIC PNL TOTAL CA: CPT

## 2017-03-20 PROCEDURE — 99233 SBSQ HOSP IP/OBS HIGH 50: CPT | Performed by: INTERNAL MEDICINE

## 2017-03-20 PROCEDURE — 36415 COLL VENOUS BLD VENIPUNCTURE: CPT

## 2017-03-20 PROCEDURE — 700111 HCHG RX REV CODE 636 W/ 250 OVERRIDE (IP)

## 2017-03-20 PROCEDURE — 93350 STRESS TTE ONLY: CPT | Mod: 26 | Performed by: INTERNAL MEDICINE

## 2017-03-20 RX ORDER — CARVEDILOL 3.12 MG/1
3.12 TABLET ORAL 2 TIMES DAILY WITH MEALS
Status: DISCONTINUED | OUTPATIENT
Start: 2017-03-20 | End: 2017-03-23 | Stop reason: HOSPADM

## 2017-03-20 RX ADMIN — HEPARIN SODIUM 5000 UNITS: 5000 INJECTION, SOLUTION INTRAVENOUS; SUBCUTANEOUS at 20:35

## 2017-03-20 RX ADMIN — LISINOPRIL 5 MG: 5 TABLET ORAL at 10:03

## 2017-03-20 RX ADMIN — DOBUTAMINE HYDROCHLORIDE: 100 INJECTION INTRAVENOUS at 13:30

## 2017-03-20 RX ADMIN — BETAMETHASONE DIPROPIONATE: 0.5 CREAM TOPICAL at 20:44

## 2017-03-20 RX ADMIN — CARVEDILOL 3.12 MG: 3.12 TABLET, FILM COATED ORAL at 18:23

## 2017-03-20 RX ADMIN — HEPARIN SODIUM 5000 UNITS: 5000 INJECTION, SOLUTION INTRAVENOUS; SUBCUTANEOUS at 14:52

## 2017-03-20 RX ADMIN — HEPARIN SODIUM 5000 UNITS: 5000 INJECTION, SOLUTION INTRAVENOUS; SUBCUTANEOUS at 05:31

## 2017-03-20 RX ADMIN — PRAVASTATIN SODIUM 40 MG: 20 TABLET ORAL at 20:34

## 2017-03-20 RX ADMIN — ASPIRIN 81 MG: 81 TABLET, CHEWABLE ORAL at 10:03

## 2017-03-20 RX ADMIN — LEVOTHYROXINE SODIUM 25 MCG: 25 TABLET ORAL at 05:31

## 2017-03-20 RX ADMIN — DILTIAZEM HYDROCHLORIDE 30 MG: 30 TABLET, FILM COATED ORAL at 03:53

## 2017-03-20 RX ADMIN — Medication 2 TABLET: at 20:35

## 2017-03-20 ASSESSMENT — PAIN SCALES - GENERAL
PAINLEVEL_OUTOF10: 0

## 2017-03-20 ASSESSMENT — ENCOUNTER SYMPTOMS
SHORTNESS OF BREATH: 1
FEVER: 0
CLAUDICATION: 0
DIZZINESS: 0
CHILLS: 0

## 2017-03-20 NOTE — PROGRESS NOTES
Assumed care at 0700, bedside report received from NOC shift RN. Patient is AAOX4 with no sign of distress. Initial assessment completed, orders reviewed, call light within reach, bed alarm in use, and hourly rounding in place. POC addressed with patient no additional questions at this time. Cardiology notified of patient overnight 29 beats v-tach, plan for patient to have KIARRA this after to assess aortic valve possible AVR in future, patient to be NPO after breakfast per cards breakfast clear liquids.

## 2017-03-20 NOTE — PROGRESS NOTES
Patient seen and films reviewed. Patient scheduled for dobutamine stress echo. If the results are still equivocal he will need KIARRA. Will follow.

## 2017-03-20 NOTE — CARE PLAN
Problem: Safety  Goal: Will remain free from injury  Outcome: PROGRESSING AS EXPECTED  Patient's bed is in low position, treaded socks are on, upper bed rails are up, and call light is within reach    Problem: Venous Thromboembolism (VTW)/Deep Vein Thrombosis (DVT) Prevention:  Goal: Patient will participate in Venous Thrombosis (VTE)/Deep Vein Thrombosis (DVT)Prevention Measures  Outcome: PROGRESSING AS EXPECTED  Pharm prophylaxis in use, early ambulation encouraged

## 2017-03-20 NOTE — PROGRESS NOTES
"Cardiology Progress Note               Author: Tunde Hernándezsrmaikel Date & Time created: 3/20/2017  9:30 AM     Interval History:  Admitted for episodic \"hyperventilating\" usually during mild exertion i.e.   Walking from his truck to the trailer. Sedentary.   Denied dizziness, CP or syncope    Has known bicuspid AV with moderate AS by KIARRA 2015    Cath yesterday AV area 0.71 cm2 with relatively low gradient but low output as well  EF 40%  No CAD    Has been having frequent PVCs and occ NSVT  Had another 28 beats run fast NSVT around 3 AM. He as asleep.    Review of Systems   Constitutional: Negative for fever and chills.   Respiratory: Positive for shortness of breath.         Wife does not think he snores   Cardiovascular: Negative for chest pain, claudication and leg swelling.   Neurological: Negative for dizziness.       Physical Exam   Constitutional: He is oriented to person, place, and time. No distress.   Obese, NAD   HENT:   Mouth/Throat: Mucous membranes are normal.   Neck: No JVD present. No tracheal deviation present. No thyroid mass and no thyromegaly present.   Cardiovascular: Normal rate, regular rhythm and intact distal pulses.  Exam reveals distant heart sounds.    Murmur heard.   Systolic murmur is present with a grade of 3/6   Pulmonary/Chest: Effort normal and breath sounds normal. No respiratory distress. He exhibits no tenderness.   Abdominal: Soft. There is no tenderness.   Musculoskeletal: He exhibits no edema.   Neurological: He is alert and oriented to person, place, and time. He has normal strength. He displays no tremor.   Skin: Skin is warm and dry. He is not diaphoretic.   Vitals reviewed.      Hemodynamics:  Temp (24hrs), Av.5 °C (97.7 °F), Min:36.1 °C (97 °F), Max:36.9 °C (98.4 °F)  Temperature: 36.9 °C (98.4 °F)  Pulse  Av.6  Min: 35  Max: 79   Blood Pressure: 143/75 mmHg     Respiratory:    Respiration: 19, Pulse Oximetry: 94 %        RUL Breath Sounds: Diminished, RML " Breath Sounds: Diminished, RLL Breath Sounds: Diminished, YESSICA Breath Sounds: Diminished, LLL Breath Sounds: Diminished  Fluids:     Weight: (!) 125.4 kg (276 lb 7.3 oz)  GI/Nutrition:  Orders Placed This Encounter   Procedures   • DIET ORDER     Standing Status: Standing      Number of Occurrences: 1      Standing Expiration Date:      Order Specific Question:  Diet:     Answer:  Cardiac [6]     Lab Results:  Recent Labs      03/18/17   0036  03/19/17   0823  03/20/17   0218   WBC  6.0  5.4  6.9   RBC  4.66*  4.92  4.98   HEMOGLOBIN  14.8  15.7  15.6   HEMATOCRIT  43.2  45.4  47.0   MCV  92.7  92.3  94.4   MCH  31.8  31.9  31.3   MCHC  34.3  34.6  33.2*   RDW  42.4  42.1  42.7   PLATELETCT  159*  171  172   MPV  10.6  10.4  10.5     Recent Labs      03/18/17   0036  03/19/17   0824  03/20/17   0218   SODIUM  136  137  137   POTASSIUM  4.0  4.4  4.1   CHLORIDE  107  108  104   CO2  21  23  27   GLUCOSE  118*  115*  89   BUN  18  18  18   CREATININE  1.22  1.07  1.18   CALCIUM  8.3*  8.7  9.2     Recent Labs      03/17/17   1130   APTT  26.4   INR  0.96     Recent Labs      03/17/17   1130   BNPBTYPENAT  278*     Recent Labs      03/17/17   1130  03/17/17   1901  03/18/17   0036   TROPONINI  0.06*  0.07*  0.06*   BNPBTYPENAT  278*   --    --              Medical Decision Making, by Problem:  Active Hospital Problems    Diagnosis   • Non-sustained ventricular tachycardia (CMS-HCC) [I47.2] symptomatic   • Aortic stenosis due to bicuspid aortic valve [Q23.1, I35.0] likely severe and symptomatic   • Cardiomyopathy (CMS-HCC) [I42.9] nonischemic   • Acute on chronic heart failure (CMS-HCC) [I50.9]   • Bradycardia [R00.1] probably from diltiazem   Obesity  Past tobacco use    Plan:  Reviewed his extensive record including KIARRA from 1/2015 and last two TTEs.  I beleive that his symptoms and VT are related to his aortic valve disorder.  Will consult CV surgery for consideration of AVR  May benefit from KIARRA to better clarify his  aortic valve.  Will d/c diltiazem  Continue ACEI.  Try low dose carvedilol once HR returns to normal range.  Consult EP for VT.  Spent >45 minutes discussing with him and his wife.      Core Measures

## 2017-03-20 NOTE — PROGRESS NOTES
HOSPITALIST PROGRESS NOTE (SOAP)   Date of Service  3/19  CHIEF COMPLAINT  56 y.o.yo male presented 3/17/2017 with Shortness of Breath    SUBJECTIVE  No lightheadedness, shortness of breath or chest pain. He is anticiopating cardiac cath.  PHYSICAL EXAM  Filed Vitals:    03/19/17 1425 03/19/17 1455 03/19/17 1525 03/19/17 1555   BP: 137/76 123/78 138/78 133/77   Pulse: 49 52 51 53   Temp:       Resp:       Height:       Weight:       SpO2: 96% 96% 95% 97%       Intake/Output Summary (Last 24 hours) at 03/19/17 1826  Last data filed at 03/19/17 1800   Gross per 24 hour   Intake    250 ml   Output    350 ml   Net   -100 ml   Vitals reviewed  General/Constitutional: No acute distress.    Head: Normocephalic, atraumatic  ENT: Oral mucosa is moist. No obvious pharyngeal exudates  Eyes: Pink conjunctiva, no scleral icterus  Neck: Supple, no lymphadenopathy  Cardiovascular: Normal rate and regular rhythm. S1,2 noted. Slight murmur noted.  Pulmonary: Clear to auscultation bilaterally. No wheezes, rales or rhonchi  Abdominal: Soft, nontender, not distended, bowel sounds normoactive.  Musculoskeletal: No tenderness to palpation of chest wall. Bilateral lower extremity edema.  Neurologic: Grossly nonfocal, moving all extremities.  Genitourinary: No gross hematuria  Skin: No obvious rash.  Psychiatric: Pleasant, cooperative.  LABS  Lab Results   Component Value Date/Time    WBC 5.4 03/19/2017 08:23 AM    RBC 4.92 03/19/2017 08:23 AM    HEMOGLOBIN 15.7 03/19/2017 08:23 AM    HEMATOCRIT 45.4 03/19/2017 08:23 AM    MCV 92.3 03/19/2017 08:23 AM    MCH 31.9 03/19/2017 08:23 AM    MCHC 34.6 03/19/2017 08:23 AM    MPV 10.4 03/19/2017 08:23 AM    NEUTROPHILS-POLYS 62.60 03/18/2017 12:36 AM    LYMPHOCYTES 28.80 03/18/2017 12:36 AM    MONOCYTES 4.80 03/18/2017 12:36 AM    EOSINOPHILS 2.70 03/18/2017 12:36 AM    BASOPHILS 0.80 03/18/2017 12:36 AM      Lab Results   Component Value Date/Time    SODIUM 137 03/19/2017 08:24 AM    POTASSIUM  4.4 03/19/2017 08:24 AM    CHLORIDE 108 03/19/2017 08:24 AM    CO2 23 03/19/2017 08:24 AM    GLUCOSE 115* 03/19/2017 08:24 AM    BUN 18 03/19/2017 08:24 AM    CREATININE 1.07 03/19/2017 08:24 AM      Lab Results   Component Value Date/Time    PT 13.1 03/17/2017 11:30 AM    INR 0.96 03/17/2017 11:30 AM      No results found for: BLOODCULTU, BLDCULT, BCHOLD  Labs reviewed  Imaging reviewed  Medications reviewed  ASSESSMENT and PLAN  Active Problems:    Chest pain    NSVT    PVC (premature ventricular contraction)    Hypertension    Hyperlipidemia    Moderate aortic stenosis  On lisinopril, diltiazem, aspirin, pravastatin. ardiac catheterization today. Echocardiogram showed EF decreased slightly from previous, to 45%. Telemetry.    I spent 35 minutes, reviewing the chart, notes, vitals, labs, imaging, ordering labs, evaluating Gagandeep Durbin Jr. for assessment, enacting the plan above and writing this note. 50% of the time was spent in counseling Gagandeep Durbin Jr. and familyanswering questions.

## 2017-03-20 NOTE — PROGRESS NOTES
DOBUTAMINE STRESS ECHO, AORTIC VALVE STUDY    Pt arrived via wheelchair, on RA, with #22 gauge IV in his R upper arm.  Consent signed at 1345 and placed in chart.      Physical Exam:  Constitutional: He is oriented to person, place, and time. No distress.   Cardiovascular: Bradycardic, regular rhythm and intact distal pulses.  Exam reveals distant heart sounds. Murmur heard.  Pulmonary/Chest: Effort normal and breath sounds normal. No respiratory distress. He exhibits no tenderness.   Musculoskeletal: He exhibits no edema.   Neurological: He is alert and oriented to person, place, and time. He has normal strength. He displays no tremor.     Vitals throughout the exam are as follows:    Dobutamine Infusion rate (250 mg in 250 ml D5W); Max dose 20 mcg/kg/min    Time mcg/kg/min ml/hr BP HR   Baseline:   1348 0 0 125/78 55   Stage 1 @ 5 mins 1400 5 37.5 150/86 57   Stage 2 @ 5 mins 1405 10 75 128/84 60   Stage 3 @ 5 mins 1410 20 150 105/64 82   Recovery 1412 20 150 94/63 80    1414- dobut stopped   101/61 82    1416   109/61 80    1418   112/63 70    1420   112/66 61       RASHEED Bradley  [facility]

## 2017-03-20 NOTE — CARE PLAN
Problem: Knowledge Deficit  Goal: Knowledge of disease process/condition, treatment plan, diagnostic tests, and medications will improve  Intervention: Explain information regarding disease process/condition, treatment plan, diagnostic tests, and medications and document in education  Pt educated to the indication and findings of the cardiac catheterization. Pt verbalized understanding.       Problem: Discharge Barriers/Planning  Goal: Patient’s continuum of care needs will be met  Intervention: Involve patient and significant other/support system in setting and prioritizing goals for hospital stay and discharge  Pt and wife updated to POC and verbalized understanding.

## 2017-03-20 NOTE — PROGRESS NOTES
Report received by day shift RN. Pt sitting up at edge of bed eating dinner with wife at bedside. Pt has no c/o pain. Groin sight has scant old serosanguineous drainage that has been marked and is old. Femoral sight soft and palpable. Bed locked in low position with fall precautions in place. Call light in place and bed side table with in reach.

## 2017-03-21 PROBLEM — I50.42 CHRONIC COMBINED SYSTOLIC AND DIASTOLIC CHF (CONGESTIVE HEART FAILURE) (HCC): Status: ACTIVE | Noted: 2017-03-21

## 2017-03-21 PROBLEM — I35.0 SEVERE AORTIC STENOSIS: Status: ACTIVE | Noted: 2017-03-21

## 2017-03-21 PROBLEM — N28.9 RENAL INSUFFICIENCY: Status: ACTIVE | Noted: 2017-03-21

## 2017-03-21 LAB
ANION GAP SERPL CALC-SCNC: 6 MMOL/L (ref 0–11.9)
BUN SERPL-MCNC: 20 MG/DL (ref 8–22)
CALCIUM SERPL-MCNC: 9.1 MG/DL (ref 8.5–10.5)
CHLORIDE SERPL-SCNC: 105 MMOL/L (ref 96–112)
CHOLEST SERPL-MCNC: 153 MG/DL (ref 100–199)
CO2 SERPL-SCNC: 26 MMOL/L (ref 20–33)
CREAT SERPL-MCNC: 1.3 MG/DL (ref 0.5–1.4)
ERYTHROCYTE [DISTWIDTH] IN BLOOD BY AUTOMATED COUNT: 41.8 FL (ref 35.9–50)
GFR SERPL CREATININE-BSD FRML MDRD: 57 ML/MIN/1.73 M 2
GLUCOSE SERPL-MCNC: 101 MG/DL (ref 65–99)
HCT VFR BLD AUTO: 46.5 % (ref 42–52)
HDLC SERPL-MCNC: 35 MG/DL
HGB BLD-MCNC: 15.6 G/DL (ref 14–18)
LDLC SERPL CALC-MCNC: 78 MG/DL
MCH RBC QN AUTO: 31.1 PG (ref 27–33)
MCHC RBC AUTO-ENTMCNC: 33.5 G/DL (ref 33.7–35.3)
MCV RBC AUTO: 92.6 FL (ref 81.4–97.8)
PLATELET # BLD AUTO: 161 K/UL (ref 164–446)
PMV BLD AUTO: 10.4 FL (ref 9–12.9)
POTASSIUM SERPL-SCNC: 4.3 MMOL/L (ref 3.6–5.5)
RBC # BLD AUTO: 5.02 M/UL (ref 4.7–6.1)
SODIUM SERPL-SCNC: 137 MMOL/L (ref 135–145)
TRIGL SERPL-MCNC: 198 MG/DL (ref 0–149)
WBC # BLD AUTO: 6.3 K/UL (ref 4.8–10.8)

## 2017-03-21 PROCEDURE — 80048 BASIC METABOLIC PNL TOTAL CA: CPT

## 2017-03-21 PROCEDURE — 770020 HCHG ROOM/CARE - TELE (206)

## 2017-03-21 PROCEDURE — A9270 NON-COVERED ITEM OR SERVICE: HCPCS | Performed by: INTERNAL MEDICINE

## 2017-03-21 PROCEDURE — 80061 LIPID PANEL: CPT

## 2017-03-21 PROCEDURE — 36415 COLL VENOUS BLD VENIPUNCTURE: CPT

## 2017-03-21 PROCEDURE — 93880 EXTRACRANIAL BILAT STUDY: CPT | Mod: 26 | Performed by: SURGERY

## 2017-03-21 PROCEDURE — 700111 HCHG RX REV CODE 636 W/ 250 OVERRIDE (IP): Performed by: HOSPITALIST

## 2017-03-21 PROCEDURE — 85027 COMPLETE CBC AUTOMATED: CPT

## 2017-03-21 PROCEDURE — 700102 HCHG RX REV CODE 250 W/ 637 OVERRIDE(OP): Performed by: HOSPITALIST

## 2017-03-21 PROCEDURE — 99233 SBSQ HOSP IP/OBS HIGH 50: CPT | Performed by: FAMILY MEDICINE

## 2017-03-21 PROCEDURE — 93880 EXTRACRANIAL BILAT STUDY: CPT

## 2017-03-21 PROCEDURE — 700102 HCHG RX REV CODE 250 W/ 637 OVERRIDE(OP): Performed by: INTERNAL MEDICINE

## 2017-03-21 PROCEDURE — A9270 NON-COVERED ITEM OR SERVICE: HCPCS | Performed by: HOSPITALIST

## 2017-03-21 RX ADMIN — CARVEDILOL 3.12 MG: 3.12 TABLET, FILM COATED ORAL at 18:14

## 2017-03-21 RX ADMIN — BETAMETHASONE DIPROPIONATE: 0.5 CREAM TOPICAL at 20:45

## 2017-03-21 RX ADMIN — CARVEDILOL 3.12 MG: 3.12 TABLET, FILM COATED ORAL at 08:31

## 2017-03-21 RX ADMIN — Medication 2 TABLET: at 08:30

## 2017-03-21 RX ADMIN — HEPARIN SODIUM 5000 UNITS: 5000 INJECTION, SOLUTION INTRAVENOUS; SUBCUTANEOUS at 20:48

## 2017-03-21 RX ADMIN — HEPARIN SODIUM 5000 UNITS: 5000 INJECTION, SOLUTION INTRAVENOUS; SUBCUTANEOUS at 14:31

## 2017-03-21 RX ADMIN — POLYETHYLENE GLYCOL 3350 1 PACKET: 17 POWDER, FOR SOLUTION ORAL at 08:39

## 2017-03-21 RX ADMIN — LISINOPRIL 5 MG: 5 TABLET ORAL at 08:31

## 2017-03-21 RX ADMIN — PRAVASTATIN SODIUM 40 MG: 20 TABLET ORAL at 20:48

## 2017-03-21 RX ADMIN — HEPARIN SODIUM 5000 UNITS: 5000 INJECTION, SOLUTION INTRAVENOUS; SUBCUTANEOUS at 05:43

## 2017-03-21 RX ADMIN — Medication 2 TABLET: at 20:48

## 2017-03-21 RX ADMIN — ASPIRIN 81 MG: 81 TABLET, CHEWABLE ORAL at 09:00

## 2017-03-21 RX ADMIN — BETAMETHASONE DIPROPIONATE 1 APPLICATION: 0.5 CREAM TOPICAL at 08:31

## 2017-03-21 RX ADMIN — LEVOTHYROXINE SODIUM 25 MCG: 25 TABLET ORAL at 05:43

## 2017-03-21 ASSESSMENT — ENCOUNTER SYMPTOMS
FEVER: 0
HEARTBURN: 0
PALPITATIONS: 0
NAUSEA: 0
HEADACHES: 0
COUGH: 0
SHORTNESS OF BREATH: 0
SORE THROAT: 0
VOMITING: 0
WHEEZING: 0
ABDOMINAL PAIN: 0
DIARRHEA: 0
CHILLS: 0
DIZZINESS: 0
BLURRED VISION: 0

## 2017-03-21 ASSESSMENT — PAIN SCALES - GENERAL
PAINLEVEL_OUTOF10: 0

## 2017-03-21 NOTE — PROGRESS NOTES
"As soon as I went to see the patient this am, he said that \" you are the one who told me that I have alcoholic cardiomyopathy, I am fine with cardiology now and I don't need to see you\". I was trying to explain to him that I only saw him once during admission, but he didn't want to listen. Hospitalist EL Hodgson and patient EL Nevarez by the bedside.   The case was discussed with Dr. Kraft who agreed to see the patient in the hospital.  "

## 2017-03-21 NOTE — PROGRESS NOTES
MS:  SR 65-77 with F PVCs, coup, bigem and trigem and 5 beats of V-tach MD aware.  0.18/0.08/0.44

## 2017-03-21 NOTE — PROGRESS NOTES
"Cardiology Progress Note               Author: Nuzhat Hodgson Date & Time created: 3/21/2017  8:18 AM     Interval History:  56 year old with HX of moderate AS with bicuspid AV, presented with orthostatic complaints and  \"hyperventalation\" with chest pain    3/21:  127/78  HR 64    No chest pain or SOB this am        POSTOPERATIVE DIAGNOSES:  1.  At least moderate aortic stenosis with a simultaneous mean gradient of 24    mmHg and calculated aortic valve area of 0.71.  2.  Reduced left ventricular ejection fraction, estimated at 40%.  3.  No significant epicardial coronary artery disease.  4.  Reduced thermodilution cardiac output of 2.53 with an index of 1.02.  5.  Left ventricular end-diastolic pressure 28.       Echocardiography Laboratory    CONCLUSIONS  1. Left ventricular ejection fraction is visually estimated to be 50-  55%. Grade I diastolic dysfunction.    2. No doppler evidence of valvular heart disease    3. Unable to estimate pulmonary artery pressure due to an inadequate   tricuspid regurgitant jet.  Echocardiography Laboratory    CONCLUSIONS  Compared to the echo dated 02/26/2017, the aortic valve is better   visualized.  More views of the gradients are obtained.  The EF appears   to have decreased.    1. Left ventricular ejection fraction is visually estimated to be 45%.   Grade II diastolic dysfunction.    2. Moderate aortic stenosis with concern for severe low gradient AS.    3. Unable to estimate pulmonary artery pressure due to an inadequate   tricuspid regurgitant jet.     Echocardiography Laboratory    CONCLUSIONS Dobutamine stress:  Augmented LV function with dobutamine stress. Severe AS with mean gradient of   55 mm Hg with   dobutamine. Endocardium not well visualized but no wall motion abnormality   noted with dobutamine   stress.    Review of Systems   Constitutional: Negative for fever, chills and malaise/fatigue.   HENT: Negative for sore throat.    Respiratory: Negative for shortness of " breath.    Cardiovascular: Negative for chest pain, palpitations and leg swelling.   Gastrointestinal: Negative for nausea, vomiting and diarrhea.   Skin: Negative for rash.   Neurological: Negative for dizziness.       Physical Exam   Constitutional: He is oriented to person, place, and time. He appears well-developed and well-nourished. No distress.   HENT:   Head: Normocephalic and atraumatic.   Right Ear: External ear normal.   Left Ear: External ear normal.   Neck: Normal range of motion.   Cardiovascular: Normal rate and regular rhythm.    Murmur heard.  Pulmonary/Chest: Effort normal and breath sounds normal. No respiratory distress. He has no wheezes.   Abdominal: Soft.   Musculoskeletal: Normal range of motion.   Neurological: He is alert and oriented to person, place, and time.   Skin: Skin is warm and dry.   Psychiatric: He has a normal mood and affect. His behavior is normal. Judgment and thought content normal.   Nursing note and vitals reviewed.      Hemodynamics:  Temp (24hrs), Av.6 °C (97.9 °F), Min:36.2 °C (97.2 °F), Max:36.9 °C (98.4 °F)  Temperature: 36.7 °C (98 °F)  Pulse  Av  Min: 35  Max: 79   Blood Pressure: 127/78 mmHg     Respiratory:    Respiration: 16, Pulse Oximetry: 100 %        RUL Breath Sounds: Clear, RML Breath Sounds: Diminished, RLL Breath Sounds: Diminished, YESSICA Breath Sounds: Clear, LLL Breath Sounds: Diminished  Fluids:     Weight: (!) 125.7 kg (277 lb 1.9 oz)  GI/Nutrition:  Orders Placed This Encounter   Procedures   • DIET ORDER     Standing Status: Standing      Number of Occurrences: 1      Standing Expiration Date:      Order Specific Question:  Diet:     Answer:  Cardiac [6]     Lab Results:  Recent Labs      17   0823  17   0218  17   0145   WBC  5.4  6.9  6.3   RBC  4.92  4.98  5.02   HEMOGLOBIN  15.7  15.6  15.6   HEMATOCRIT  45.4  47.0  46.5   MCV  92.3  94.4  92.6   MCH  31.9  31.3  31.1   MCHC  34.6  33.2*  33.5*   RDW  42.1  42.7  41.8    PLATELETCT  171  172  161*   MPV  10.4  10.5  10.4     Recent Labs      03/19/17   0824  03/20/17   0218  03/21/17   0145   SODIUM  137  137  137   POTASSIUM  4.4  4.1  4.3   CHLORIDE  108  104  105   CO2  23  27  26   GLUCOSE  115*  89  101*   BUN  18  18  20   CREATININE  1.07  1.18  1.30   CALCIUM  8.7  9.2  9.1                 Recent Labs      03/21/17   0145   TRIGLYCERIDE  198*   HDL  35*   LDL  78         Medical Decision Making, by Problem:  Active Hospital Problems    Diagnosis   • Non-sustained ventricular tachycardia (CMS-HCC) [I47.2]   • Aortic stenosis due to bicuspid aortic valve [Q23.1, I35.0]   • Cardiomyopathy (CMS-HCC) [I42.9]   • Acute on chronic heart failure (CMS-HCC) [I50.9]   • PVC (premature ventricular contraction) [I49.3]   • Chest pain, rule out acute myocardial infarction [R07.9]   • Bradycardia [R00.1]       Plan:  PVC:  Consider EP eval   On coreg 3.125mg      AS:  Awaiting direction from [facility] team    Case and plan with Dr. Diggs        Core Measures

## 2017-03-21 NOTE — PROGRESS NOTES
Hospital Medicine Progress Note, Adult, Complex               Author: Jeramie Kraft Date & Time created: 3/21/2017  1:29 PM     Interval History:  Admitted for Aortic Stenosis, Chest pain.    AS - severe noted on dobutamine stress Echo  CP - none currently  NSVT - no recurrence  CHF - stable    Review of Systems:  Review of Systems   Constitutional: Negative for fever and chills.   HENT: Negative for sore throat.    Eyes: Negative for blurred vision.   Respiratory: Negative for cough, shortness of breath and wheezing.    Cardiovascular: Negative for chest pain.   Gastrointestinal: Negative for heartburn, nausea, vomiting, abdominal pain and diarrhea.   Genitourinary: Negative for dysuria.   Neurological: Negative for dizziness and headaches.       Physical Exam:  Physical Exam   Constitutional: He is oriented to person, place, and time. He appears well-developed and well-nourished.   HENT:   Head: Normocephalic and atraumatic.   Eyes: Conjunctivae are normal. Pupils are equal, round, and reactive to light.   Neck: No tracheal deviation present. No thyromegaly present.   Cardiovascular: Normal rate and regular rhythm.    Murmur heard.  Pulmonary/Chest: Effort normal and breath sounds normal.   Abdominal: Soft. Bowel sounds are normal. He exhibits no distension. There is no tenderness.   Lymphadenopathy:     He has no cervical adenopathy.   Neurological: He is alert and oriented to person, place, and time.   Skin: Skin is warm and dry.   Nursing note and vitals reviewed.      Labs:        Invalid input(s): [id number]      Recent Labs      03/19/17   0824  03/20/17 0218  03/21/17   0145   SODIUM  137  137  137   POTASSIUM  4.4  4.1  4.3   CHLORIDE  108  104  105   CO2  23  27  26   BUN  18  18  20   CREATININE  1.07  1.18  1.30   CALCIUM  8.7  9.2  9.1     Recent Labs      03/19/17   0824  03/20/17   0218  03/21/17   0145   GLUCOSE  115*  89  101*     Recent Labs      03/19/17   0823  03/20/17   0218   17   0145   RBC  4.92  4.98  5.02   HEMOGLOBIN  15.7  15.6  15.6   HEMATOCRIT  45.4  47.0  46.5   PLATELETCT  171  172  161*     Recent Labs      17   0823  17   0218  17   0145   WBC  5.4  6.9  6.3           Hemodynamics:  Temp (24hrs), Av.5 °C (97.7 °F), Min:36.2 °C (97.2 °F), Max:36.9 °C (98.4 °F)  Temperature: 36.3 °C (97.3 °F)  Pulse  Av.3  Min: 35  Max: 79   Blood Pressure: 103/68 mmHg     Respiratory:    Respiration: 18, Pulse Oximetry: 90 %        RUL Breath Sounds: Clear, RML Breath Sounds: Diminished, RLL Breath Sounds: Diminished, YESSICA Breath Sounds: Clear, LLL Breath Sounds: Diminished  Fluids:    Intake/Output Summary (Last 24 hours) at 17 1329  Last data filed at 17   Gross per 24 hour   Intake    400 ml   Output      0 ml   Net    400 ml     Weight: (!) 125.7 kg (277 lb 1.9 oz)  GI/Nutrition:  Orders Placed This Encounter   Procedures   • DIET ORDER     Standing Status: Standing      Number of Occurrences: 1      Standing Expiration Date:      Order Specific Question:  Diet:     Answer:  Cardiac [6]     Medical Decision Making, by Problem:  Active Hospital Problems    Diagnosis   • Severe aortic stenosis [I35.0]     CTS and Cardiology following   • Chest pain, rule out acute myocardial infarction [R07.9]      ASA   • Non-sustained ventricular tachycardia (CMS-HCC) [I47.2]     Carvedilol   • Chronic combined systolic and diastolic CHF (congestive heart failure) (CMS-HCC) [I50.42]    Carvedilol, Lisinopril   • Renal insufficiency [N28.9]      Follow bmp   • Cardiomyopathy (CMS-HCC) [I42.9]      Carvedilol, Lisinopril   • PVC (premature ventricular contraction) [I49.3]     check Mg   • Bradycardia [R00.1]     resolved   • Hypothyroid [E03.9]      Synthroid, TSH wnl   • Hyperlipidemia [E78.5]      Pravastatin       EKG reviewed, Medications reviewed, Radiology images reviewed and Labs reviewed  Bach catheter: No Bach      DVT Prophylaxis: Heparin    Ulcer  prophylaxis: No

## 2017-03-21 NOTE — PROGRESS NOTES
Report received by day shift RN. Pt sitting up in bed with wife at bedside.  Both updated to POC and verbalized understanding. No further questions at this time. Fall precautions in place and call light in place.

## 2017-03-21 NOTE — CARE PLAN
Problem: Knowledge Deficit  Goal: Knowledge of disease process/condition, treatment plan, diagnostic tests, and medications will improve  Intervention: Explain information regarding disease process/condition, treatment plan, diagnostic tests, and medications and document in education  Pt educated for the indication for possible KIARRA in the am. Pt educated to the possible risks and benefits between the different types of aortic valvrd. Pt verbalized understanding.       Problem: Discharge Barriers/Planning  Goal: Patient’s continuum of care needs will be met  Intervention: Involve patient and significant other/support system in setting and prioritizing goals for hospital stay and discharge  Pt and wife updated to the POC and future consultation post procedures. Both verbalize understanding.

## 2017-03-21 NOTE — CONSULTS
DATE OF SERVICE:  03/20/2017    REFERRING PHYSICIAN:  Dr. Diggs.    REASON FOR CONSULTATION:  Consideration for aortic valve replacement.    CHIEF COMPLAINT:  Shortness of breath.    HISTORY OF PRESENT ILLNESS:  The patient is a pleasant 56-year-old white male   who has been having shortness of breath and substernal chest pressure for   several weeks.  He presented to the emergency room with orthostatic symptoms   and substernal chest pressure.  Echocardiography showed calcific aortic   stenosis.  However, the gradient was only approximately 20 mmHg.  His left   ventricular ejection fraction was 45%.  The aortic stenosis appeared more   severe visually.  Cardiac catheterization did not show any hemodynamically   significant coronary artery disease.    PAST MEDICAL HISTORY:  Unremarkable.    ALLERGIES:  No known drug allergies.    MEDICATIONS:  Synthroid 25 mcg p.o. q. a.m., Pravachol 20 mg 2 tablets p.o.   daily and aspirin 81 mg p.o. daily.    FAMILY HISTORY:  Negative for premature coronary artery disease.    PSYCHOSOCIAL HISTORY:  He does not smoke and does not use alcohol.    REVIEW OF SYSTEMS:  NEUROLOGIC:  There is no history of strokes or transient ischemic attacks, or   syncope.  RESPIRATORY:  Shortness of breath.  CARDIAC:  As per history of present illness, the remainder of the review of   systems is negative.    PHYSICAL EXAMINATION:  GENERAL:  Well-developed, obese 56-year-old white male in no acute distress.    He is alert and oriented x3.  VITAL SIGNS:  Blood pressure is 120/77, heart rate 55 and regular,   respirations 18.  NECK:  Supple, without jugular venous distention.  There is no cervical   lymphadenopathy.  HEENT:  Normocephalic, atraumatic.  Extraocular muscles intact.  His nasal and   oral mucosa are pink and moist.  SKIN:  Normal.  RESPIRATORY:  Lungs are clear to auscultation bilaterally.  CARDIAC:  Regular rate and rhythm with a III/VI systolic ejection murmur.  ABDOMEN:  Soft,  protuberant, nontender.  EXTREMITIES:  There is no clubbing, cyanosis or edema.  VASCULAR:  There are good peripheral pulses bilaterally.  NEUROLOGIC:  Grossly intact.    IMPRESSION:  Aortic stenosis (calcific or degenerative), hypertension,   obesity, hypothyroidism, dyslipidemia.    PLAN:  The patient will likely need aortic valve replacement and   intraoperative transesophageal echocardiography.  It is surprising that his   valve gradient turned out so low by transthoracic echocardiography.  I   recommend proceeding with a dobutamine stress echo and if the results of that   are equivocal, then I would recommend doing a transesophageal   echocardiography.  Findings and recommendations were discussed with the   patient's cardiologist, Dr. Diggs.    Thank you for this challenging consultation and participation in the patient's   care.       ____________________________________     MD SHILOH MORENO / STARR    DD:  03/20/2017 14:58:43  DT:  03/20/2017 19:12:33    D#:  [id number]  Job#:  [id number]    cc: [facility]

## 2017-03-21 NOTE — PROGRESS NOTES
HOSPITALIST PROGRESS NOTE (SOAP)   Date of Service  3/20  CHIEF COMPLAINT  56 y.o.yo male presented 3/17/2017 with Shortness of Breath    SUBJECTIVE  He tolerated cardiac cath but has  Dyspnea on exertion.  PHYSICAL EXAM  Filed Vitals:    03/20/17 0600 03/20/17 0759 03/20/17 1122 03/20/17 1547   BP: 121/76 143/75 120/77 112/76   Pulse: 50 52 55 67   Temp:  36.9 °C (98.4 °F) 36.6 °C (97.8 °F) 36.9 °C (98.4 °F)   Resp:  19 19 19   Height:       Weight:       SpO2:  94% 94% 95%       Intake/Output Summary (Last 24 hours) at 03/20/17 1902  Last data filed at 03/19/17 2200   Gross per 24 hour   Intake    400 ml   Output      0 ml   Net    400 ml   Vitals reviewed  General/Constitutional: No acute distress.   Head: Normocephalic, atraumatic  ENT: Oral mucosa is moist. No obvious pharyngeal exudates  Eyes: Pink conjunctiva, no scleral icterus  Neck: Supple, no lymphadenopathy  Cardiovascular: Normal rate and regular rhythm. S1,2 noted. Slight murmur noted.  Pulmonary: Clear to auscultation bilaterally. No wheezes, rales or rhonchi  Abdominal: Soft, nontender, not distended, bowel sounds normoactive.  Musculoskeletal: No tenderness to palpation of chest wall.  Neurologic: Grossly nonfocal, moving all extremities.  Genitourinary: No gross hematuria  Skin: No obvious rash.  Psychiatric: Pleasant, cooperative.    LABS  Lab Results   Component Value Date/Time    WBC 6.9 03/20/2017 02:18 AM    RBC 4.98 03/20/2017 02:18 AM    HEMOGLOBIN 15.6 03/20/2017 02:18 AM    HEMATOCRIT 47.0 03/20/2017 02:18 AM    MCV 94.4 03/20/2017 02:18 AM    MCH 31.3 03/20/2017 02:18 AM    MCHC 33.2* 03/20/2017 02:18 AM    MPV 10.5 03/20/2017 02:18 AM    NEUTROPHILS-POLYS 62.60 03/18/2017 12:36 AM    LYMPHOCYTES 28.80 03/18/2017 12:36 AM    MONOCYTES 4.80 03/18/2017 12:36 AM    EOSINOPHILS 2.70 03/18/2017 12:36 AM    BASOPHILS 0.80 03/18/2017 12:36 AM      Lab Results   Component Value Date/Time    SODIUM 137 03/20/2017 02:18 AM    POTASSIUM 4.1  03/20/2017 02:18 AM    CHLORIDE 104 03/20/2017 02:18 AM    CO2 27 03/20/2017 02:18 AM    GLUCOSE 89 03/20/2017 02:18 AM    BUN 18 03/20/2017 02:18 AM    CREATININE 1.18 03/20/2017 02:18 AM      Lab Results   Component Value Date/Time    PT 13.1 03/17/2017 11:30 AM    INR 0.96 03/17/2017 11:30 AM      No results found for: BLOODCULTU, BLDCULT, BCHOLD  Labs reviewed  Imaging reviewed  Medications reviewed  ASSESSMENT and PLAN  Active Problems:    Chest pain    NSVT    PVC (premature ventricular contraction)    Hypertension    Hyperlipidemia    Moderate aortic stenosis    Chronic LV systolic failure  On lisinopril, diltiazem, aspirin, pravastatin. Echocardiogram showed EF decreased slightly from previous, to 45%. Telemetry. Tolerated cardiac catheterization by Dr. Ibrahim which showed no CAD but did show EF of 30-35%. He also has moderate aortic stenosis on echo. TAVR vs valve replacement being considered.      I spent 35 minutes, reviewing the chart, notes, vitals, labs, imaging, ordering labs, evaluating Gagandeep Durbin Jr. for assessment, enacting the plan above and writing this note. 50% of the time was spent in counseling Gagandeep Funesluis Lewis. and family answering questions. All of the aove were discussed.

## 2017-03-22 LAB
ANION GAP SERPL CALC-SCNC: 9 MMOL/L (ref 0–11.9)
BUN SERPL-MCNC: 21 MG/DL (ref 8–22)
CALCIUM SERPL-MCNC: 9.2 MG/DL (ref 8.5–10.5)
CHLORIDE SERPL-SCNC: 105 MMOL/L (ref 96–112)
CO2 SERPL-SCNC: 24 MMOL/L (ref 20–33)
CREAT SERPL-MCNC: 1.28 MG/DL (ref 0.5–1.4)
GFR SERPL CREATININE-BSD FRML MDRD: 58 ML/MIN/1.73 M 2
GLUCOSE SERPL-MCNC: 108 MG/DL (ref 65–99)
MAGNESIUM SERPL-MCNC: 2 MG/DL (ref 1.5–2.5)
POTASSIUM SERPL-SCNC: 4.3 MMOL/L (ref 3.6–5.5)
SODIUM SERPL-SCNC: 138 MMOL/L (ref 135–145)

## 2017-03-22 PROCEDURE — A9270 NON-COVERED ITEM OR SERVICE: HCPCS | Performed by: INTERNAL MEDICINE

## 2017-03-22 PROCEDURE — 83735 ASSAY OF MAGNESIUM: CPT

## 2017-03-22 PROCEDURE — 700102 HCHG RX REV CODE 250 W/ 637 OVERRIDE(OP): Performed by: INTERNAL MEDICINE

## 2017-03-22 PROCEDURE — 700111 HCHG RX REV CODE 636 W/ 250 OVERRIDE (IP): Performed by: HOSPITALIST

## 2017-03-22 PROCEDURE — 99232 SBSQ HOSP IP/OBS MODERATE 35: CPT | Performed by: FAMILY MEDICINE

## 2017-03-22 PROCEDURE — 80048 BASIC METABOLIC PNL TOTAL CA: CPT

## 2017-03-22 PROCEDURE — 36415 COLL VENOUS BLD VENIPUNCTURE: CPT

## 2017-03-22 PROCEDURE — 770020 HCHG ROOM/CARE - TELE (206)

## 2017-03-22 PROCEDURE — A9270 NON-COVERED ITEM OR SERVICE: HCPCS | Performed by: HOSPITALIST

## 2017-03-22 PROCEDURE — 700102 HCHG RX REV CODE 250 W/ 637 OVERRIDE(OP): Performed by: HOSPITALIST

## 2017-03-22 RX ADMIN — HEPARIN SODIUM 5000 UNITS: 5000 INJECTION, SOLUTION INTRAVENOUS; SUBCUTANEOUS at 05:26

## 2017-03-22 RX ADMIN — BETAMETHASONE DIPROPIONATE: 0.5 CREAM TOPICAL at 08:31

## 2017-03-22 RX ADMIN — Medication 2 TABLET: at 20:09

## 2017-03-22 RX ADMIN — LISINOPRIL 5 MG: 5 TABLET ORAL at 08:18

## 2017-03-22 RX ADMIN — LEVOTHYROXINE SODIUM 25 MCG: 25 TABLET ORAL at 05:26

## 2017-03-22 RX ADMIN — Medication 2 TABLET: at 08:18

## 2017-03-22 RX ADMIN — HEPARIN SODIUM 5000 UNITS: 5000 INJECTION, SOLUTION INTRAVENOUS; SUBCUTANEOUS at 20:09

## 2017-03-22 RX ADMIN — ASPIRIN 81 MG: 81 TABLET, CHEWABLE ORAL at 08:18

## 2017-03-22 RX ADMIN — BETAMETHASONE DIPROPIONATE: 0.5 CREAM TOPICAL at 20:10

## 2017-03-22 RX ADMIN — HEPARIN SODIUM 5000 UNITS: 5000 INJECTION, SOLUTION INTRAVENOUS; SUBCUTANEOUS at 15:44

## 2017-03-22 RX ADMIN — CARVEDILOL 3.12 MG: 3.12 TABLET, FILM COATED ORAL at 17:29

## 2017-03-22 RX ADMIN — PRAVASTATIN SODIUM 40 MG: 20 TABLET ORAL at 20:08

## 2017-03-22 ASSESSMENT — ENCOUNTER SYMPTOMS
WHEEZING: 0
CHILLS: 0
BLURRED VISION: 0
PALPITATIONS: 0
HEADACHES: 0
SHORTNESS OF BREATH: 0
ABDOMINAL PAIN: 0
COUGH: 0
HEARTBURN: 0
SORE THROAT: 0
DIARRHEA: 0
DIZZINESS: 0
VOMITING: 0
NAUSEA: 0
FEVER: 0

## 2017-03-22 ASSESSMENT — PAIN SCALES - GENERAL
PAINLEVEL_OUTOF10: 0

## 2017-03-22 NOTE — CONSULTS
Addendum to Dr. Carter consult:  Patient will be done on Monday 3/27/2017 aortic valve replacement with a possible aortic root enlargement.     I discussed the risks of surgery I have quoted the patient a 3.5% operative mortality, as well as, the risk of perioperative stroke, myocardial infarction, sternal infection, dehiscence, ulnar neuropathy, paresis, phrenic nerve paralysis, possible transfusions and its attendant risks.  At the present time, the patient is willing to proceed with surgery.  Findings and recommendations were discussed with the patient’s cardiologist Dr. Ibrahim.  Thank you for this challenging consultation and participation in the patient’s care.  We will keep you apprised of all future developments.       ____________________________________   Ko Coffey M.D.

## 2017-03-22 NOTE — PROGRESS NOTES
Received report from night RN. Assumed care of patient. Patient A&O x4. Patient is on room air. Patient has 20g in right upper arm saline locked. Patient laying in bed and declines any pain at this time. Bed locked and in lowest position with call light within reach.

## 2017-03-22 NOTE — PROGRESS NOTES
Cardiology Progress Note               Author: Trupti Martinez Date & Time created: 3/22/2017  9:30 AM     Interval History:  Patient seen on EPS rounds.  Patient with severe AS with 28 beats of VT on 3/20/17.  Patient states he is going to surgery tomorrow for his aortic valve.  There was some question in the past about him going home for a week before surgery.  Dobutamine Stress Echo Report  Echocardiography Laboratory  CONCLUSIONS  Augmented LV function with dobutamine stress. Severe AS with mean gradient of   55 mm Hg with   dobutamine. Endocardium not well visualized but no wall motion abnormality   noted with dobutamine   Stress.    CTS CONSULT:  Dr. Carter Consult:  IMPRESSION:  Aortic stenosis (calcific or degenerative), hypertension,    obesity, hypothyroidism, dyslipidemia.   PLAN:  The patient will likely need aortic valve replacement and    intraoperative transesophageal echocardiography.  It is surprising that his    valve gradient turned out so low by transthoracic echocardiography.  I    recommend proceeding with a dobutamine stress echo and if the results of that    are equivocal, then I would recommend doing a transesophageal    echocardiography.  Findings and recommendations were discussed with the    patient's cardiologist, Dr. Diggs.     CARDIAC CATHETERIZATION CONCLUSIONS:  1.  At least moderate aortic stenosis with a simultaneous mean gradient of 24    mmHg and calculated aortic valve area of 0.71.  2.  Reduced left ventricular ejection fraction, estimated at 40%.  3.  No significant epicardial coronary artery disease.  4.  Reduced thermodilution cardiac output of 2.53 with an index of 1.02.  5.  Left ventricular end-diastolic pressure 28.    Echocardiography Laboratory  CONCLUSIONS  Compared to the echo dated 02/26/2017, the aortic valve is better   visualized.  More views of the gradients are obtained.  The EF appears   to have decreased.  1. Left ventricular ejection fraction is  visually estimated to be 45%.   Grade II diastolic dysfunction.  2. Moderate aortic stenosis with concern for severe low gradient AS.  3. Unable to estimate pulmonary artery pressure due to an inadequate   tricuspid regurgitant jet.          ROS    Physical Exam    Hemodynamics:  Temp (24hrs), Av.3 °C (97.4 °F), Min:36.2 °C (97.1 °F), Max:36.6 °C (97.9 °F)  Temperature: 36.2 °C (97.2 °F)  Pulse  Av.3  Min: 35  Max: 79   Blood Pressure: 137/84 mmHg     Respiratory:    Respiration: 16, Pulse Oximetry: 93 %        RUL Breath Sounds: Clear, RML Breath Sounds: Clear, RLL Breath Sounds: Diminished, YESSICA Breath Sounds: Clear, LLL Breath Sounds: Diminished  Fluids:     Weight: (!) 127.1 kg (280 lb 3.3 oz)  GI/Nutrition:  Orders Placed This Encounter   Procedures   • DIET ORDER     Standing Status: Standing      Number of Occurrences: 1      Standing Expiration Date:      Order Specific Question:  Diet:     Answer:  Cardiac [6]     Lab Results:  Recent Labs      17   0145   WBC  6.9  6.3   RBC  4.98  5.02   HEMOGLOBIN  15.6  15.6   HEMATOCRIT  47.0  46.5   MCV  94.4  92.6   MCH  31.3  31.1   MCHC  33.2*  33.5*   RDW  42.7  41.8   PLATELETCT  172  161*   MPV  10.5  10.4     Recent Labs      178  17   0145  17   0212   SODIUM  137  137  138   POTASSIUM  4.1  4.3  4.3   CHLORIDE  104  105  105   CO2  27  26  24   GLUCOSE  89  101*  108*   BUN  18  20  21   CREATININE  1.18  1.30  1.28   CALCIUM  9.2  9.1  9.2                 Recent Labs      17   0145   TRIGLYCERIDE  198*   HDL  35*   LDL  78         Medical Decision Making, by Problem:  Active Hospital Problems    Diagnosis   • Severe aortic stenosis [I35.0]   • Chest pain, rule out acute myocardial infarction [R07.9]   • Non-sustained ventricular tachycardia (CMS-HCC) [I47.2]   • Chronic combined systolic and diastolic CHF (congestive heart failure) (CMS-HCC) [I50.42]   • Renal insufficiency [N28.9]   •  Cardiomyopathy (CMS-HCC) [I42.9]   • PVC (premature ventricular contraction) [I49.3]   • Bradycardia [R00.1]   • Hypothyroid [E03.9]   • Hyperlipidemia [E78.5]       Plan:  EP will follow along with H/O NSVT.  EF 40% will anticipate.  No further VT since 3/20/17.  Might consider switch to Metoprolol from Coreg for rhythm control.  Dr Tran will see patient and full consult note will accompany that visit.  Core Measures

## 2017-03-22 NOTE — PROGRESS NOTES
"Cardiology Progress Note               Author: Nuzhat Hodgson Date & Time created: 3/22/2017  9:02 AM     Interval History:  56 year old with HX of moderate AS with bicuspid AV, presented with orthostatic complaints and  \"hyperventalation\" with chest pain    3/22:  No cardiac complaints  Up to chair  Questions answered     137/84      3/21:  127/78  HR 64    No chest pain or SOB this am        POSTOPERATIVE DIAGNOSES:  1.  At least moderate aortic stenosis with a simultaneous mean gradient of 24    mmHg and calculated aortic valve area of 0.71.  2.  Reduced left ventricular ejection fraction, estimated at 40%.  3.  No significant epicardial coronary artery disease.  4.  Reduced thermodilution cardiac output of 2.53 with an index of 1.02.  5.  Left ventricular end-diastolic pressure 28.       Echocardiography Laboratory    CONCLUSIONS  1. Left ventricular ejection fraction is visually estimated to be 50-  55%. Grade I diastolic dysfunction.    2. No doppler evidence of valvular heart disease    3. Unable to estimate pulmonary artery pressure due to an inadequate   tricuspid regurgitant jet.  Echocardiography Laboratory    CONCLUSIONS  Compared to the echo dated 02/26/2017, the aortic valve is better   visualized.  More views of the gradients are obtained.  The EF appears   to have decreased.    1. Left ventricular ejection fraction is visually estimated to be 45%.   Grade II diastolic dysfunction.    2. Moderate aortic stenosis with concern for severe low gradient AS.    3. Unable to estimate pulmonary artery pressure due to an inadequate   tricuspid regurgitant jet.     Echocardiography Laboratory    CONCLUSIONS Dobutamine stress:  Augmented LV function with dobutamine stress. Severe AS with mean gradient of   55 mm Hg with   dobutamine. Endocardium not well visualized but no wall motion abnormality   noted with dobutamine   stress.    Review of Systems   Constitutional: Negative for fever, chills and " malaise/fatigue.   HENT: Negative for sore throat.    Respiratory: Negative for shortness of breath.    Cardiovascular: Negative for chest pain, palpitations and leg swelling.   Gastrointestinal: Negative for nausea, vomiting and diarrhea.   Skin: Negative for rash.   Neurological: Negative for dizziness.       Physical Exam   Constitutional: He is oriented to person, place, and time. He appears well-developed and well-nourished. No distress.   HENT:   Head: Normocephalic and atraumatic.   Right Ear: External ear normal.   Left Ear: External ear normal.   Neck: Normal range of motion.   Cardiovascular: Normal rate and regular rhythm.    Murmur heard.  Pulmonary/Chest: Effort normal and breath sounds normal. No respiratory distress. He has no wheezes.   Abdominal: Soft.   Musculoskeletal: Normal range of motion.   Neurological: He is alert and oriented to person, place, and time.   Skin: Skin is warm and dry.   Psychiatric: He has a normal mood and affect. His behavior is normal. Judgment and thought content normal.   Nursing note and vitals reviewed.      Hemodynamics:  Temp (24hrs), Av.3 °C (97.4 °F), Min:36.2 °C (97.1 °F), Max:36.6 °C (97.9 °F)  Temperature: 36.2 °C (97.2 °F)  Pulse  Av.3  Min: 35  Max: 79   Blood Pressure: 137/84 mmHg     Respiratory:    Respiration: 16, Pulse Oximetry: 93 %        RUL Breath Sounds: Clear, RML Breath Sounds: Clear, RLL Breath Sounds: Diminished, YESSICA Breath Sounds: Clear, LLL Breath Sounds: Diminished  Fluids:     Weight: (!) 127.1 kg (280 lb 3.3 oz)  GI/Nutrition:  Orders Placed This Encounter   Procedures   • DIET ORDER     Standing Status: Standing      Number of Occurrences: 1      Standing Expiration Date:      Order Specific Question:  Diet:     Answer:  Cardiac [6]     Lab Results:  Recent Labs      17   0218  17   0145   WBC  6.9  6.3   RBC  4.98  5.02   HEMOGLOBIN  15.6  15.6   HEMATOCRIT  47.0  46.5   MCV  94.4  92.6   MCH  31.3  31.1   MCHC  33.2*   33.5*   RDW  42.7  41.8   PLATELETCT  172  161*   MPV  10.5  10.4     Recent Labs      03/20/17   0218  03/21/17   0145  03/22/17   0212   SODIUM  137  137  138   POTASSIUM  4.1  4.3  4.3   CHLORIDE  104  105  105   CO2  27  26  24   GLUCOSE  89  101*  108*   BUN  18  20  21   CREATININE  1.18  1.30  1.28   CALCIUM  9.2  9.1  9.2                 Recent Labs      03/21/17   0145   TRIGLYCERIDE  198*   HDL  35*   LDL  78         Medical Decision Making, by Problem:  Active Hospital Problems    Diagnosis   • Non-sustained ventricular tachycardia (CMS-HCC) [I47.2]   • Aortic stenosis due to bicuspid aortic valve [Q23.1, I35.0]   • Cardiomyopathy (CMS-HCC) [I42.9]   • Acute on chronic heart failure (CMS-HCC) [I50.9]   • PVC (premature ventricular contraction) [I49.3]   • Chest pain, rule out acute myocardial infarction [R07.9]   • Bradycardia [R00.1]       Plan:  PVC:  Ep eval for Vtach  On coreg 3.125mg      AS:  Pt states surgery is tomorrow  Ok to move to [facility] to prep if that is scheduled      Case and plan with Dr. Diggs        Core Measures

## 2017-03-22 NOTE — PROGRESS NOTES
Hospital Medicine Progress Note, Adult, Complex               Author: Jeramie Pereirawaalex Date & Time created: 3/22/2017  1:44 PM     Interval History:  Admitted for Aortic Stenosis, Chest pain.    AS - severe noted on dobutamine stress Echo, for AVR tomorrow  CP - none currently  NSVT - no recurrence  CHF - stable    Review of Systems:  Review of Systems   Constitutional: Negative for fever and chills.   HENT: Negative for sore throat.    Eyes: Negative for blurred vision.   Respiratory: Negative for cough, shortness of breath and wheezing.    Cardiovascular: Negative for chest pain.   Gastrointestinal: Negative for heartburn, nausea, vomiting, abdominal pain and diarrhea.   Genitourinary: Negative for dysuria.   Neurological: Negative for dizziness and headaches.       Physical Exam:  Physical Exam   Constitutional: He is oriented to person, place, and time. He appears well-developed and well-nourished.   HENT:   Head: Normocephalic and atraumatic.   Eyes: Conjunctivae are normal. Pupils are equal, round, and reactive to light.   Neck: No tracheal deviation present. No thyromegaly present.   Cardiovascular: Normal rate and regular rhythm.    Murmur heard.  Pulmonary/Chest: Effort normal and breath sounds normal.   Abdominal: Soft. Bowel sounds are normal. He exhibits no distension. There is no tenderness.   Lymphadenopathy:     He has no cervical adenopathy.   Neurological: He is alert and oriented to person, place, and time.   Skin: Skin is warm and dry.   Nursing note and vitals reviewed.      Labs:        Invalid input(s): [id number]      Recent Labs      03/20/17 0218 03/21/17 0145 03/22/17 0212   SODIUM  137  137  138   POTASSIUM  4.1  4.3  4.3   CHLORIDE  104  105  105   CO2  27  26  24   BUN  18  20  21   CREATININE  1.18  1.30  1.28   MAGNESIUM   --    --   2.0   CALCIUM  9.2  9.1  9.2     Recent Labs      03/20/17 0218 03/21/17 0145 03/22/17 0212   GLUCOSE  89  101*  108*     Recent  Labs      17   0218  17   0145   RBC  4.98  5.02   HEMOGLOBIN  15.6  15.6   HEMATOCRIT  47.0  46.5   PLATELETCT  172  161*     Recent Labs      17   0218  17   0145   WBC  6.9  6.3           Hemodynamics:  Temp (24hrs), Av.3 °C (97.4 °F), Min:36.2 °C (97.1 °F), Max:36.6 °C (97.9 °F)  Temperature: 36.2 °C (97.2 °F)  Pulse  Av.3  Min: 35  Max: 79   Blood Pressure: 117/70 mmHg     Respiratory:    Respiration: 18, Pulse Oximetry: 93 %        RUL Breath Sounds: Clear, RML Breath Sounds: Clear, RLL Breath Sounds: Diminished, YESSICA Breath Sounds: Clear, LLL Breath Sounds: Diminished  Fluids:  No intake or output data in the 24 hours ending 17 1344  Weight: (!) 127.1 kg (280 lb 3.3 oz)  GI/Nutrition:  Orders Placed This Encounter   Procedures   • DIET ORDER     Standing Status: Standing      Number of Occurrences: 1      Standing Expiration Date:      Order Specific Question:  Diet:     Answer:  Cardiac [6]     Medical Decision Making, by Problem:  Active Hospital Problems    Diagnosis   • Severe aortic stenosis [I35.0]     for AVR tomorrow   • Chest pain, rule out acute myocardial infarction [R07.9]        ASA   • Non-sustained ventricular tachycardia (CMS-HCC) [I47.2]        Carvedilol   • Chronic combined systolic and diastolic CHF (congestive heart failure) (CMS-HCC) [I50.42]    Carvedilol, Lisinopril   • Renal insufficiency [N28.9]      follow bmp   • Cardiomyopathy (CMS-HCC) [I42.9]      Carvedilol, Lisinopril   • PVC (premature ventricular contraction) [I49.3]        • Bradycardia [R00.1]     resolved   • Hypothyroid [E03.9]      Synthroid, TSH wnl   • Hyperlipidemia [E78.5]      Pravastatin       EKG reviewed, Medications reviewed, Radiology images reviewed and Labs reviewed  Bach catheter: No Bach      DVT Prophylaxis: Heparin    Ulcer prophylaxis: No

## 2017-03-23 ENCOUNTER — HOSPITAL ENCOUNTER (OUTPATIENT)
Facility: MEDICAL CENTER | Age: 57
DRG: 951 | End: 2017-03-23
Attending: THORACIC SURGERY (CARDIOTHORACIC VASCULAR SURGERY) | Admitting: THORACIC SURGERY (CARDIOTHORACIC VASCULAR SURGERY)
Payer: COMMERCIAL

## 2017-03-23 VITALS
HEART RATE: 57 BPM | TEMPERATURE: 96.8 F | OXYGEN SATURATION: 95 % | SYSTOLIC BLOOD PRESSURE: 128 MMHG | RESPIRATION RATE: 18 BRPM | WEIGHT: 279.98 LBS | HEIGHT: 73 IN | BODY MASS INDEX: 37.11 KG/M2 | DIASTOLIC BLOOD PRESSURE: 75 MMHG

## 2017-03-23 LAB
ANION GAP SERPL CALC-SCNC: 5 MMOL/L (ref 0–11.9)
BUN SERPL-MCNC: 19 MG/DL (ref 8–22)
CALCIUM SERPL-MCNC: 9 MG/DL (ref 8.5–10.5)
CHLORIDE SERPL-SCNC: 103 MMOL/L (ref 96–112)
CO2 SERPL-SCNC: 27 MMOL/L (ref 20–33)
CREAT SERPL-MCNC: 1.13 MG/DL (ref 0.5–1.4)
GFR SERPL CREATININE-BSD FRML MDRD: >60 ML/MIN/1.73 M 2
GLUCOSE SERPL-MCNC: 102 MG/DL (ref 65–99)
MAGNESIUM SERPL-MCNC: 2 MG/DL (ref 1.5–2.5)
PHOSPHATE SERPL-MCNC: 3.2 MG/DL (ref 2.5–4.5)
POTASSIUM SERPL-SCNC: 4.2 MMOL/L (ref 3.6–5.5)
SODIUM SERPL-SCNC: 135 MMOL/L (ref 135–145)

## 2017-03-23 PROCEDURE — 700102 HCHG RX REV CODE 250 W/ 637 OVERRIDE(OP): Performed by: HOSPITALIST

## 2017-03-23 PROCEDURE — 83735 ASSAY OF MAGNESIUM: CPT

## 2017-03-23 PROCEDURE — 84100 ASSAY OF PHOSPHORUS: CPT

## 2017-03-23 PROCEDURE — A9270 NON-COVERED ITEM OR SERVICE: HCPCS | Performed by: INTERNAL MEDICINE

## 2017-03-23 PROCEDURE — 99239 HOSP IP/OBS DSCHRG MGMT >30: CPT | Performed by: FAMILY MEDICINE

## 2017-03-23 PROCEDURE — 700102 HCHG RX REV CODE 250 W/ 637 OVERRIDE(OP): Performed by: INTERNAL MEDICINE

## 2017-03-23 PROCEDURE — A9270 NON-COVERED ITEM OR SERVICE: HCPCS | Performed by: HOSPITALIST

## 2017-03-23 PROCEDURE — 80048 BASIC METABOLIC PNL TOTAL CA: CPT

## 2017-03-23 PROCEDURE — 36415 COLL VENOUS BLD VENIPUNCTURE: CPT

## 2017-03-23 RX ORDER — LISINOPRIL 5 MG/1
5 TABLET ORAL DAILY
Qty: 30 TAB | Refills: 1 | Status: SHIPPED | OUTPATIENT
Start: 2017-03-23 | End: 2017-05-25

## 2017-03-23 RX ORDER — CARVEDILOL 3.12 MG/1
3.12 TABLET ORAL 2 TIMES DAILY WITH MEALS
Qty: 60 TAB | Refills: 1 | Status: SHIPPED | OUTPATIENT
Start: 2017-03-23 | End: 2017-05-25 | Stop reason: SDUPTHER

## 2017-03-23 RX ADMIN — CARVEDILOL 3.12 MG: 3.12 TABLET, FILM COATED ORAL at 08:42

## 2017-03-23 RX ADMIN — LEVOTHYROXINE SODIUM 25 MCG: 25 TABLET ORAL at 05:51

## 2017-03-23 RX ADMIN — Medication 2 TABLET: at 08:42

## 2017-03-23 RX ADMIN — LISINOPRIL 5 MG: 5 TABLET ORAL at 08:42

## 2017-03-23 RX ADMIN — BETAMETHASONE DIPROPIONATE: 0.5 CREAM TOPICAL at 08:43

## 2017-03-23 ASSESSMENT — LIFESTYLE VARIABLES
EVER_SMOKED: YES
PACK_YEARS: 42

## 2017-03-23 ASSESSMENT — PAIN SCALES - GENERAL
PAINLEVEL_OUTOF10: 0
PAINLEVEL_OUTOF10: 0

## 2017-03-23 ASSESSMENT — ENCOUNTER SYMPTOMS
NAUSEA: 0
SHORTNESS OF BREATH: 0
CLAUDICATION: 0
VOMITING: 0
WEAKNESS: 0
COUGH: 0
SORE THROAT: 0
FOCAL WEAKNESS: 0
HEADACHES: 0
BLURRED VISION: 0
ORTHOPNEA: 0
HEARTBURN: 0
LOSS OF CONSCIOUSNESS: 0
CHILLS: 0
ABDOMINAL PAIN: 0
PSYCHIATRIC NEGATIVE: 1
FEVER: 0
SEIZURES: 0
HEMOPTYSIS: 0
MUSCULOSKELETAL NEGATIVE: 1
PND: 0
SPEECH CHANGE: 0
PALPITATIONS: 0
DIZZINESS: 0
WEIGHT LOSS: 0
MYALGIAS: 0
WHEEZING: 0
FLANK PAIN: 0
DOUBLE VISION: 0
SPUTUM PRODUCTION: 0

## 2017-03-23 NOTE — CARE PLAN
Problem: Infection  Goal: Will remain free from infection  Intervention: Assess signs and symptoms of infection  Discussed with patient the importance of good hand hygiene while in the hospital in order to help prevent infection. Patient very accepting of the information and all questions were answered at this time.       Problem: Venous Thromboembolism (VTW)/Deep Vein Thrombosis (DVT) Prevention:  Goal: Patient will participate in Venous Thrombosis (VTE)/Deep Vein Thrombosis (DVT)Prevention Measures  Intervention: Assess and monitor for anticoagulation complications  Discussed with patient the importance of VTE prophylaxis in order to prevent blood clots while in the hospital. Discussed with patient that it also helps that he mobilizes around to help as well. Patient very accepting of information and all questions were answered at this time.

## 2017-03-23 NOTE — PROGRESS NOTES
Surgical office called and stated pt will be having the valve replacement done by Dr. Carter on Monday 3/27 around 1:30pm. He will have to come to their office this Friday (3/24) though to have labs drawn and pre surgery tests done. Family updated.

## 2017-03-23 NOTE — PROGRESS NOTES
Assumed care of patient with new graduate RN at 0715.  Report received from Night RN Autumn. Patient's chart and MAR reviewed. Patient was updated on plan of care for the day. Questions answered and concerns addressed.  Pt denies any additional needs at this time. White board updated. Call light, phone and personal belongings within reach. Pt anxious to go home today.

## 2017-03-23 NOTE — CONSULTS
DATE OF SERVICE:  03/22/2017    ADDENDUM    The patient is a 56-year-old white male with aortic stenosis and normal   coronary arteries.  The patient will ultimately need an aortic valve   replacement.  Concern that the patient had one bout of V-tach while sleeping,   short-lived, resolved spontaneously; was concerning to the cardiologist to   keep the patient hospitalized.  The patient has had no further ventricular   tachycardia during this hospitalization.  He has been started on metoprolol.    I have made arrangements for the patient to be discharged with followup on   Friday with Dr. Carter with surgery scheduled Monday afternoon.  I discussed   this with Dr. Tran.  He thinks the risk of sending the patient home is   extremely small given the fact that it was a short-lived ventricular   tachycardia which resolved spontaneously.  Therefore, the plan will be to   discharge the patient to home, follow up with Dr. Carter this Friday in his   office as an outpatient with outpatient surgery scheduled on Monday.       ____________________________________     MD MICHAEL DUCKWORTH / STARR    DD:  03/22/2017 14:56:33  DT:  03/22/2017 19:19:47    D#:  [id number]  Job#:  [id number]

## 2017-03-23 NOTE — CARE PLAN
Problem: Communication  Goal: The ability to communicate needs accurately and effectively will improve  Intervention: Educate patient and significant other/support system about the plan of care, procedures, treatments, medications and allow for questions  Patient's white board is updated. Patient is updated on plan of care. All questions were answered.       Problem: Knowledge Deficit  Goal: Knowledge of disease process/condition, treatment plan, diagnostic tests, and medications will improve  Pt educated about disease process and plan of care for the day.  Pt verbalized understanding.

## 2017-03-23 NOTE — PROGRESS NOTES
Monitor summary: .18/.10/.40 SB 47-58 with occasional PVCs, couplets and bigeminal PVCs, and 5 beats of asymptomatic Vtach.

## 2017-03-23 NOTE — HEART FAILURE PROGRAM
"Cardiovascular Nurse Navigator () Progress Note:     This patient has a history of HF. However, per Dr. Kraft in IDT rounds 3/23, the HF is chronic and not in exacerbation at this time. Therefore, a seven day HF f/u appt is not currently indicated.    Should pt develop an iatrogenic exacerbation, he will require a seven day f/u appt which can be achieved by placing a \"schedule heart failure follow up appointment\" order per protocol or by calling the hospital schedulers at [phone].     Thank you and please call with questions or concerns.      "

## 2017-03-23 NOTE — PROGRESS NOTES
IV and tele box removed. Discharge instructions and medications discussed with patient, all questions answered, pt verbalized understanding. Follow up appointments made. Discharge instructions, prescriptions and personal belongings with patient. Patient down to car by walking with spouse and daughter. Patient off unit without incident.

## 2017-03-23 NOTE — PROGRESS NOTES
Cardiology Progress Note               Author: Trupti Martinez Date & Time created: 3/23/2017  8:58 AM     Interval History:  Patient seen on EPS rounds.  Patient with severe AS with 28 beats of VT on 3/20/17. 5 beats VT during the night asymptomatic.  Patient states he is going to surgery Monday for his aortic valve.Will go home over weekend for his birthday and then back for surgery on Monday.    Dobutamine Stress Echo Report  Echocardiography Laboratory  CONCLUSIONS  Augmented LV function with dobutamine stress. Severe AS with mean gradient of   55 mm Hg with   dobutamine. Endocardium not well visualized but no wall motion abnormality   noted with dobutamine   Stress.    CTS CONSULT:  Dr. Carter Consult:  IMPRESSION:  Aortic stenosis (calcific or degenerative), hypertension,    obesity, hypothyroidism, dyslipidemia.   PLAN:  The patient will likely need aortic valve replacement and    intraoperative transesophageal echocardiography.  It is surprising that his    valve gradient turned out so low by transthoracic echocardiography.  I    recommend proceeding with a dobutamine stress echo and if the results of that    are equivocal, then I would recommend doing a transesophageal    echocardiography.  Findings and recommendations were discussed with the    patient's cardiologist, Dr. Diggs.     CARDIAC CATHETERIZATION CONCLUSIONS:  1.  At least moderate aortic stenosis with a simultaneous mean gradient of 24    mmHg and calculated aortic valve area of 0.71.  2.  Reduced left ventricular ejection fraction, estimated at 40%.  3.  No significant epicardial coronary artery disease.  4.  Reduced thermodilution cardiac output of 2.53 with an index of 1.02.  5.  Left ventricular end-diastolic pressure 28.    Echocardiography Laboratory  CONCLUSIONS  Compared to the echo dated 02/26/2017, the aortic valve is better   visualized.  More views of the gradients are obtained.  The EF appears   to have decreased.  1. Left  ventricular ejection fraction is visually estimated to be 45%.   Grade II diastolic dysfunction.  2. Moderate aortic stenosis with concern for severe low gradient AS.  3. Unable to estimate pulmonary artery pressure due to an inadequate   tricuspid regurgitant jet.       Echocardiography Laboratory    CONCLUSIONS  Compared to the echo dated 02/26/2017, the aortic valve is better   visualized.  More views of the gradients are obtained.  The EF appears   to have decreased.    1. Left ventricular ejection fraction is visually estimated to be 45%.   Grade II diastolic dysfunction.    2. Moderate aortic stenosis with concern for severe low gradient AS.    3. Unable to estimate pulmonary artery pressure due to an inadequate   tricuspid regurgitant jet.         Review of Systems   Constitutional: Negative for fever, chills, weight loss and malaise/fatigue.   HENT: Negative for congestion and sore throat.    Eyes: Negative for blurred vision and double vision.   Respiratory: Negative for cough, sputum production, shortness of breath and wheezing.    Cardiovascular: Negative for chest pain, palpitations, orthopnea, claudication, leg swelling and PND.   Gastrointestinal: Negative for heartburn, nausea, vomiting and abdominal pain.   Genitourinary: Negative for dysuria, frequency and flank pain.   Musculoskeletal: Negative.    Skin: Negative.    Neurological: Negative for dizziness, speech change, focal weakness, seizures, loss of consciousness and headaches.   Endo/Heme/Allergies: Negative.    Psychiatric/Behavioral: Negative.        Physical Exam   Constitutional: He is oriented to person, place, and time. He appears well-developed and well-nourished.   HENT:   Head: Normocephalic and atraumatic.   Eyes: Pupils are equal, round, and reactive to light.   Neck: Normal range of motion. Neck supple. No thyromegaly present.   Cardiovascular: Normal rate and regular rhythm.  Exam reveals no gallop and no friction rub.    Murmur  heard.  Pulmonary/Chest: Effort normal and breath sounds normal. No respiratory distress. He has no wheezes. He has no rales.   Abdominal: Soft. Bowel sounds are normal. He exhibits no distension. There is no tenderness. There is no guarding.   Musculoskeletal: Normal range of motion. He exhibits no edema.   Neurological: He is alert and oriented to person, place, and time.   Skin: Skin is warm and dry.   Psychiatric: He has a normal mood and affect.       Hemodynamics:  Temp (24hrs), Av.4 °C (97.6 °F), Min:36 °C (96.8 °F), Max:36.8 °C (98.3 °F)  Temperature: 36 °C (96.8 °F)  Pulse  Av.7  Min: 35  Max: 84   Blood Pressure: 128/75 mmHg     Respiratory:    Respiration: 18, Pulse Oximetry: 95 %        RUL Breath Sounds: Clear, RML Breath Sounds: Clear, RLL Breath Sounds: Diminished, YESSICA Breath Sounds: Clear, LLL Breath Sounds: Diminished  Fluids:     Weight: (!) 127 kg (279 lb 15.8 oz)  GI/Nutrition:  Orders Placed This Encounter   Procedures   • DIET ORDER     Standing Status: Standing      Number of Occurrences: 1      Standing Expiration Date:      Order Specific Question:  Diet:     Answer:  Cardiac [6]     Lab Results:  Recent Labs      17   0145   WBC  6.3   RBC  5.02   HEMOGLOBIN  15.6   HEMATOCRIT  46.5   MCV  92.6   MCH  31.1   MCHC  33.5*   RDW  41.8   PLATELETCT  161*   MPV  10.4     Recent Labs      17   0145  17   0212  17   0754   SODIUM  137  138  135   POTASSIUM  4.3  4.3  4.2   CHLORIDE  105  105  103   CO2  26  24  27   GLUCOSE  101*  108*  102*   BUN  20  21  19   CREATININE  1.30  1.28  1.13   CALCIUM  9.1  9.2  9.0                 Recent Labs      17   0145   TRIGLYCERIDE  198*   HDL  35*   LDL  78         Medical Decision Making, by Problem:  Active Hospital Problems    Diagnosis   • Severe aortic stenosis [I35.0]   • Chest pain, rule out acute myocardial infarction [R07.9]   • Non-sustained ventricular tachycardia (CMS-HCC) [I47.2]   • Chronic combined  systolic and diastolic CHF (congestive heart failure) (CMS-[facility]) [I50.42]   • Renal insufficiency [N28.9]   • Cardiomyopathy (CMS-HCC) [I42.9]   • PVC (premature ventricular contraction) [I49.3]   • Bradycardia [R00.1]   • Hypothyroid [E03.9]   • Hyperlipidemia [E78.5]       Plan:  Patient to go home today.  Dr Tran saw him yesterday and they agreed home with no Vest . Chance of arrhythmic issue 1/1000  Home on current BB therapy.  To return Monday for AVR.    EKG reviewed, Medications reviewed and Labs reviewed

## 2017-03-23 NOTE — DISCHARGE INSTRUCTIONS
Discharge Instructions    Discharged to home by car with relative. Discharged via walking, hospital escort: Yes.  Special equipment needed: Not Applicable    Be sure to schedule a follow-up appointment with your primary care doctor or any specialists as instructed.     Discharge Plan:   Smoking Cessation Offered: Patient Refused  Influenza Vaccine Indication: Patient Refuses    I understand that a diet low in cholesterol, fat, and sodium is recommended for good health. Unless I have been given specific instructions below for another diet, I accept this instruction as my diet prescription.         · Is patient discharged on Warfarin / Coumadin?   No     · Is patient Post Blood Transfusion?  No      Aortic Valve Replacement   Aortic valve replacement is a procedure to replace an aortic valve that cannot be repaired. An artificial (prosthetic) valve is used to do this. Three types of prosthetic valves are available:   · Mechanical valves made entirely from man-made materials.    · Donor valves made from human donors. These are only used in special situations.    · Biological valves made from animal tissues.    The type of prosthetic valve used will be determined based on various factors, including your age, your lifestyle, and other medical conditions you have.   LET YOUR HEALTH CARE PROVIDER KNOW ABOUT:  · Any allergies you have.  · All medicines you are taking, including vitamins, herbs, eye drops, creams, and over-the-counter medicines.  · Previous problems you or members of your family have had with the use of anesthetics.  · Any blood disorders you have.  · Previous surgeries you have had.  · Medical conditions you have.  RISKS AND COMPLICATIONS  Generally, this is a safe procedure. However, as with any procedure, problems can occur. Possible problems include:   · Blood clotting caused by the new valve. Replacement with a mechanical valve requires lifelong treatment with medicine to prevent blood clots.     · Infection in the new valve.    · Valve failure.    · Bleeding.    · Reaction to anesthetics.    BEFORE THE PROCEDURE  · Ask your health care provider about:  ¨ Changing or stopping your regular medicines. This is especially important if you are taking diabetes medicines or blood thinners.  ¨ Taking medicines such as aspirin and ibuprofen. These medicines can thin your blood. Do not take these medicines before your procedure if your health care provider asks you not to.  · Do not eat or drink anything after midnight on the night before the procedure or as directed by your health care provider.  PROCEDURE   The surgeon may use either an open technique or a minimally invasive technique for this surgery:   Traditional open surgery  · You will be given a medicine that makes you fall asleep (general anesthetic).  · You will then be placed on a heart-lung bypass machine. This machine provides oxygen to your blood while the heart is undergoing surgery.  · During surgery, the surgeon will make a large cut (incision) in the chest.  · The heart will be cooled to slow or stop the heartbeat.  · The damaged aortic valve will be removed and replaced with a prosthetic heart valve.  · The incision will then be closed with staples or stitches.  Minimally invasive surgery  This is done through a smaller incision. This still requires general anesthetic and the heart-lung bypass machine. Your heart will be cooled to slow or stop the heartbeat, allowing the damaged valve to be removed and replaced with the new valve. The smaller incision will then be closed. If your condition allows for this procedure, there is often less blood loss, less pain, and faster recovery compared to traditional open surgery.   AFTER THE PROCEDURE  You will be monitored closely in a recovery area. From there, you will likely go to an intensive care unit.       This information is not intended to replace advice given to you by your health care provider. Make  sure you discuss any questions you have with your health care provider.     Document Released: 05/09/2006 Document Revised: 01/08/2016 Document Reviewed: 05/27/2014  [employer] Patient Education ©2016 [employer]      Depression / Suicide Risk    As you are discharged from this [facility] facility, it is important to learn how to keep safe from harming yourself.    Recognize the warning signs:  · Abrupt changes in personality, positive or negative- including increase in energy   · Giving away possessions  · Change in eating patterns- significant weight changes-  positive or negative  · Change in sleeping patterns- unable to sleep or sleeping all the time   · Unwillingness or inability to communicate  · Depression  · Unusual sadness, discouragement and loneliness  · Talk of wanting to die  · Neglect of personal appearance   · Rebelliousness- reckless behavior  · Withdrawal from people/activities they love  · Confusion- inability to concentrate     If you or a loved one observes any of these behaviors or has concerns about self-harm, here's what you can do:  · Talk about it- your feelings and reasons for harming yourself  · Remove any means that you might use to hurt yourself (examples: pills, rope, extension cords, firearm)  · Get professional help from the community (Mental Health, Substance Abuse, psychological counseling)  · Do not be alone:Call your Safe Contact- someone whom you trust who will be there for you.  · Call your local CRISIS HOTLINE 114-9240 or 235-350-4468  · Call your local Children's Mobile Crisis Response Team [place] (077) 413-5463 or www.People and Pages  · Call the toll free National Suicide Prevention Hotlines   · National Suicide Prevention Lifeline 181-698-ZNDH (5212)  · National Hope Line Network 800-SUICIDE (608-4420)

## 2017-03-23 NOTE — PROGRESS NOTES
Assumed care at 0715. Received report from EL Barbosa. Pt. Awake in bed. Pt. Updated on plan of care. No additional needs at this time. All safety measures in place.

## 2017-03-23 NOTE — PROGRESS NOTES
Cardiology Progress Note               Author: Tunde Hernándezsrmaikel Date & Time created: 3/23/2017  9:08 AM     Interval History:  No palpitations or dizziness  Ambulated without issue    Monitor one 5 beat NSVT asymptomatic  No long NSVT >48 hours now  Plan now is to go home and return for AVR on Monday    Review of Systems   Constitutional: Negative for malaise/fatigue.   HENT: Negative for congestion.    Respiratory: Negative for hemoptysis and shortness of breath.    Cardiovascular: Negative for chest pain, palpitations, orthopnea, claudication, leg swelling and PND.   Gastrointestinal: Negative for nausea, vomiting and abdominal pain.   Musculoskeletal: Negative for myalgias.   Neurological: Negative for dizziness, speech change, focal weakness and weakness.       Physical Exam   Constitutional: He is oriented to person, place, and time. No distress.   HENT:   Mouth/Throat: Mucous membranes are normal.   Eyes: Conjunctivae and EOM are normal.   Neck: No JVD present. No tracheal deviation present. No thyroid mass and no thyromegaly present.   Cardiovascular: Normal rate, regular rhythm and intact distal pulses.  Exam reveals distant heart sounds.    Murmur heard.   Systolic murmur is present with a grade of 2/6   Pulmonary/Chest: Effort normal and breath sounds normal. No respiratory distress. He exhibits no tenderness.   Abdominal: Soft. There is no tenderness.   Musculoskeletal: Normal range of motion. He exhibits no edema.   Neurological: He is alert and oriented to person, place, and time. He has normal strength. He displays no tremor.   Skin: Skin is warm and dry. He is not diaphoretic.   Psychiatric: He has a normal mood and affect. His behavior is normal.   Vitals reviewed.      Hemodynamics:  Temp (24hrs), Av.4 °C (97.6 °F), Min:36 °C (96.8 °F), Max:36.8 °C (98.3 °F)  Temperature: 36 °C (96.8 °F)  Pulse  Av.7  Min: 35  Max: 84   Blood Pressure: 128/75 mmHg     Respiratory:    Respiration: 18,  Pulse Oximetry: 95 %        RUL Breath Sounds: Clear, RML Breath Sounds: Clear, RLL Breath Sounds: Diminished, YESSICA Breath Sounds: Clear, LLL Breath Sounds: Diminished  Fluids:     Weight: (!) 127 kg (279 lb 15.8 oz)  GI/Nutrition:  Orders Placed This Encounter   Procedures   • DIET ORDER     Standing Status: Standing      Number of Occurrences: 1      Standing Expiration Date:      Order Specific Question:  Diet:     Answer:  Cardiac [6]     Lab Results:  Recent Labs      03/21/17   0145   WBC  6.3   RBC  5.02   HEMOGLOBIN  15.6   HEMATOCRIT  46.5   MCV  92.6   MCH  31.1   MCHC  33.5*   RDW  41.8   PLATELETCT  161*   MPV  10.4     Recent Labs      03/21/17   0145  03/22/17   0212  03/23/17   0754   SODIUM  137  138  135   POTASSIUM  4.3  4.3  4.2   CHLORIDE  105  105  103   CO2  26  24  27   GLUCOSE  101*  108*  102*   BUN  20  21  19   CREATININE  1.30  1.28  1.13   CALCIUM  9.1  9.2  9.0                 Recent Labs      03/21/17   0145   TRIGLYCERIDE  198*   HDL  35*   LDL  78         Medical Decision Making, by Problem:  Active Hospital Problems    Diagnosis   • Severe aortic stenosis [I35.0] with CHF   • Chest pain, no CAD   • Non-sustained ventricular tachycardia (CMS-HCC) [I47.2] improved   • Chronic combined systolic and diastolic CHF (congestive heart failure) (CMS-HCC) [I50.42]   • Renal insufficiency [N28.9] resolved   • Cardiomyopathy (CMS-HCC) [I42.9]   • PVC (premature ventricular contraction) [I49.3]   • Bradycardia [R00.1]   • Hypothyroid [E03.9]   • Hyperlipidemia [E78.5]       Plan:  AVR planned for Monday per CV surgery  Avoid strenuous activity until then  Return if palpitations dizziness recur prior to that time    Core Measures

## 2017-03-24 ENCOUNTER — HOSPITAL ENCOUNTER (OUTPATIENT)
Dept: RADIOLOGY | Facility: MEDICAL CENTER | Age: 57
DRG: 951 | End: 2017-03-24
Attending: THORACIC SURGERY (CARDIOTHORACIC VASCULAR SURGERY) | Admitting: THORACIC SURGERY (CARDIOTHORACIC VASCULAR SURGERY)
Payer: COMMERCIAL

## 2017-03-24 ENCOUNTER — HOSPITAL ENCOUNTER (OUTPATIENT)
Dept: CARDIOLOGY | Facility: MEDICAL CENTER | Age: 57
DRG: 951 | End: 2017-03-24
Attending: THORACIC SURGERY (CARDIOTHORACIC VASCULAR SURGERY) | Admitting: THORACIC SURGERY (CARDIOTHORACIC VASCULAR SURGERY)
Payer: COMMERCIAL

## 2017-03-24 ENCOUNTER — PATIENT OUTREACH (OUTPATIENT)
Dept: HEALTH INFORMATION MANAGEMENT | Facility: OTHER | Age: 57
End: 2017-03-24

## 2017-03-24 LAB
ABO GROUP BLD: NORMAL
ABO GROUP BLD: NORMAL
ALBUMIN SERPL BCP-MCNC: 3.9 G/DL (ref 3.2–4.9)
ALBUMIN/GLOB SERPL: 1.3 G/DL
ALP SERPL-CCNC: 67 U/L (ref 30–99)
ALT SERPL-CCNC: 41 U/L (ref 2–50)
ANION GAP SERPL CALC-SCNC: 8 MMOL/L (ref 0–11.9)
APPEARANCE UR: CLEAR
APTT PPP: 27.8 SEC (ref 24.7–36)
AST SERPL-CCNC: 26 U/L (ref 12–45)
BACTERIA #/AREA URNS HPF: ABNORMAL /HPF
BASOPHILS # BLD AUTO: 0.5 % (ref 0–1.8)
BASOPHILS # BLD: 0.03 K/UL (ref 0–0.12)
BILIRUB SERPL-MCNC: 0.7 MG/DL (ref 0.1–1.5)
BILIRUB UR QL STRIP.AUTO: NEGATIVE
BLD GP AB SCN SERPL QL: NORMAL
BUN SERPL-MCNC: 22 MG/DL (ref 8–22)
CALCIUM SERPL-MCNC: 9.3 MG/DL (ref 8.5–10.5)
CHLORIDE SERPL-SCNC: 104 MMOL/L (ref 96–112)
CO2 SERPL-SCNC: 27 MMOL/L (ref 20–33)
COLOR UR: YELLOW
CREAT SERPL-MCNC: 1.41 MG/DL (ref 0.5–1.4)
EKG IMPRESSION: NORMAL
EOSINOPHIL # BLD AUTO: 0.12 K/UL (ref 0–0.51)
EOSINOPHIL NFR BLD: 1.9 % (ref 0–6.9)
EPI CELLS #/AREA URNS HPF: ABNORMAL /HPF
ERYTHROCYTE [DISTWIDTH] IN BLOOD BY AUTOMATED COUNT: 42.3 FL (ref 35.9–50)
EST. AVERAGE GLUCOSE BLD GHB EST-MCNC: 120 MG/DL
GFR SERPL CREATININE-BSD FRML MDRD: 51 ML/MIN/1.73 M 2
GLOBULIN SER CALC-MCNC: 2.9 G/DL (ref 1.9–3.5)
GLUCOSE SERPL-MCNC: 64 MG/DL (ref 65–99)
GLUCOSE UR STRIP.AUTO-MCNC: NEGATIVE MG/DL
HBA1C MFR BLD: 5.8 % (ref 0–5.6)
HCT VFR BLD AUTO: 47 % (ref 42–52)
HGB BLD-MCNC: 15.9 G/DL (ref 14–18)
HYALINE CASTS #/AREA URNS LPF: ABNORMAL /LPF
IMM GRANULOCYTES # BLD AUTO: 0.02 K/UL (ref 0–0.11)
IMM GRANULOCYTES NFR BLD AUTO: 0.3 % (ref 0–0.9)
INR PPP: 0.99 (ref 0.87–1.13)
KETONES UR STRIP.AUTO-MCNC: NEGATIVE MG/DL
LEUKOCYTE ESTERASE UR QL STRIP.AUTO: ABNORMAL
LYMPHOCYTES # BLD AUTO: 1.31 K/UL (ref 1–4.8)
LYMPHOCYTES NFR BLD: 21 % (ref 22–41)
MCH RBC QN AUTO: 31.6 PG (ref 27–33)
MCHC RBC AUTO-ENTMCNC: 33.8 G/DL (ref 33.7–35.3)
MCV RBC AUTO: 93.4 FL (ref 81.4–97.8)
MICRO URNS: ABNORMAL
MONOCYTES # BLD AUTO: 0.4 K/UL (ref 0–0.85)
MONOCYTES NFR BLD AUTO: 6.4 % (ref 0–13.4)
MUCOUS THREADS #/AREA URNS HPF: ABNORMAL /HPF
NEUTROPHILS # BLD AUTO: 4.36 K/UL (ref 1.82–7.42)
NEUTROPHILS NFR BLD: 69.9 % (ref 44–72)
NITRITE UR QL STRIP.AUTO: NEGATIVE
NRBC # BLD AUTO: 0 K/UL
NRBC BLD AUTO-RTO: 0 /100 WBC
PH UR STRIP.AUTO: 5.5 [PH]
PLATELET # BLD AUTO: 189 K/UL (ref 164–446)
PMV BLD AUTO: 10.4 FL (ref 9–12.9)
POTASSIUM SERPL-SCNC: 4.4 MMOL/L (ref 3.6–5.5)
PROT SERPL-MCNC: 6.8 G/DL (ref 6–8.2)
PROT UR QL STRIP: NEGATIVE MG/DL
PROTHROMBIN TIME: 13.4 SEC (ref 12–14.6)
RBC # BLD AUTO: 5.03 M/UL (ref 4.7–6.1)
RBC # URNS HPF: ABNORMAL /HPF
RBC UR QL AUTO: NEGATIVE
RH BLD: NORMAL
SODIUM SERPL-SCNC: 139 MMOL/L (ref 135–145)
SP GR UR STRIP.AUTO: 1.02
WBC # BLD AUTO: 6.2 K/UL (ref 4.8–10.8)
WBC #/AREA URNS HPF: ABNORMAL /HPF

## 2017-03-24 PROCEDURE — 71020 DX-CHEST-2 VIEWS: CPT

## 2017-03-24 PROCEDURE — 85730 THROMBOPLASTIN TIME PARTIAL: CPT

## 2017-03-24 PROCEDURE — 93010 ELECTROCARDIOGRAM REPORT: CPT | Performed by: INTERNAL MEDICINE

## 2017-03-24 PROCEDURE — 85025 COMPLETE CBC W/AUTO DIFF WBC: CPT

## 2017-03-24 PROCEDURE — 86850 RBC ANTIBODY SCREEN: CPT

## 2017-03-24 PROCEDURE — 86900 BLOOD TYPING SEROLOGIC ABO: CPT

## 2017-03-24 PROCEDURE — 85610 PROTHROMBIN TIME: CPT

## 2017-03-24 PROCEDURE — 81001 URINALYSIS AUTO W/SCOPE: CPT

## 2017-03-24 PROCEDURE — 93005 ELECTROCARDIOGRAM TRACING: CPT | Performed by: THORACIC SURGERY (CARDIOTHORACIC VASCULAR SURGERY)

## 2017-03-24 PROCEDURE — 36415 COLL VENOUS BLD VENIPUNCTURE: CPT

## 2017-03-24 PROCEDURE — 86901 BLOOD TYPING SEROLOGIC RH(D): CPT

## 2017-03-24 PROCEDURE — 83036 HEMOGLOBIN GLYCOSYLATED A1C: CPT

## 2017-03-24 PROCEDURE — 80053 COMPREHEN METABOLIC PANEL: CPT

## 2017-03-24 NOTE — DISCHARGE SUMMARY
DATE OF ADMISSION:  03/17/2017    DATE OF DISCHARGE:  03/23/2017    DISCHARGE DIAGNOSES:  1.  Severe aortic stenosis.  2.  Chest pain.  3.  Nonsustained ventricular tachycardia.  4.  Chronic combined systolic and diastolic congestive heart failure.  5.  Renal insufficiency.  6.  Cardiomyopathy.  7.  PVCs.  8.  Bradycardia.  9.  Hypothyroidism.  10.  Hyperlipidemia.    CONSULTS:  1.  Cardiothoracic surgery Dr. Ko Coffey.  2.  Cardiothoracic surgery, Aditya Carter MD  3.  Cardiology, Dr. Christopher Ibrahim.    PROCEDURES:  1.  Coronary angiography, which showed at least moderate aortic stenosis with   reduced left ventricular ejection fraction of 40%.  There is no significant   coronary artery disease.  2.  Carotid duplex which showed minimal plaque in the bilateral carotid bulb,   less than 50% stenosis.  Normal bilateral subclavian and vertebral arterial   exam.  3.  Echocardiogram which showed left ventricular ejection fraction estimated   45% with grade II diastolic dysfunction, there is moderate aortic stenosis.  4.  Stress echocardiogram which showed augmented LV function with dobutamine   stress, severe AS with gradient of 55 mmHg with dobutamine.    HISTORY OF PRESENT ILLNESS:  For detailed H and P, please see Dr. Donis's note   on 03/17/2017.  In brief summary, a 56-year-old white male who came in with   symptoms of chest pain and shortness of breath.  He also was noted to have   nonsustained ventricular tachycardia.  He was admitted for further evaluation   and management.    HOSPITAL COURSE:  Upon admission, the patient was referred to cardiology.    Coronary angiogram was done, which revealed no coronary artery disease, but   does have moderate aortic stenosis.  Cardiology continued to follow patient as   well as EP cardiology for nonsustained ventricular tachycardia.  He was   placed on carvedilol.  There have been some runs of ventricular tachycardia   which are nonsustained, most have been applied  beats at the most and he is   asymptomatic.  Cardiothoracic surgery was also consulted and he underwent   dobutamine stress echo, which did show severe aortic stenosis.  Will try to   get it scheduled for him soon; however, the earliest schedule will be next   week.  He has been placed on Coreg as well as lisinopril.  His heart rate has   been in the high 50s to low 60s which he is able to tolerate.  Cardiology and   cardiothoracic surgery have cleared him for discharge with follow up next week   for scheduled aortic valve replacement.  The patient will now be discharged   improved and in stable condition.    DISCHARGE MEDICATIONS:  1.  Coreg 3.125 mg twice a day with meals.  2.  Lisinopril 5 mg per day.  3.  Diprolene cream.  4.  Levothyroxine 25 mcg daily.  5.  Pravastatin 40 mg daily.  6.  Vitamin D 2000 units per day.    DIET:  Cardiac, low salt.    ACTIVITY:  As tolerated.    FOLLOWUP:  Follow up with primary care physician, Dr. Roopa Butler, in 4 weeks.    Follow up with Dr. Aditya Carter as scheduled on March 24th.    Total discharge time was 35 minutes.       ____________________________________     MD EDUARDO GUARDADO / STARR    DD:  03/23/2017 15:22:04  DT:  03/23/2017 19:02:39    D#:  [id number]  Job#:  [id number]

## 2017-03-24 NOTE — CARE PLAN
Problem: Pre Op  Goal: Optimal preparation for CABG/Heart Valve surgery  Intervention: Pre Op education to patient/significant other. Provide patient [facility] Patient Guideline for Cardiac Surgery (See Pt. Ed.)  Discussed anatomy and physiology of cardiac surgery with patient and family to include pre-op regimen. Reviewed post-op expectations to include  the use of incentive spirometry with return demonstration, ventilator management, cardiac monitoring, tubes and drains, early ambulation, and expected length of stay. Also provided information on Cardiac Rehab and how to schedule an appointment. Patient and family state full understanding of all information given.  Intervention: Baseline assessment documented to include IS volume, weight, bilateral BP and peripheral pulses.  3000 mL  Intervention: NPO at midnight except cardiac medications. (No ASA, coumadin or Plavix)  Instructed patient nothing to eat or drink after midnight the night prior to scheduled surgery date.  Intervention: Shower with chlorhexidine x 2  Instructed patient to wash entire body with chlorhexedine wipes prior to bedtime the night before surgery.

## 2017-03-26 ENCOUNTER — HOSPITAL ENCOUNTER (EMERGENCY)
Facility: MEDICAL CENTER | Age: 57
End: 2017-03-26
Attending: EMERGENCY MEDICINE | Admitting: THORACIC SURGERY (CARDIOTHORACIC VASCULAR SURGERY)
Payer: COMMERCIAL

## 2017-03-26 ENCOUNTER — APPOINTMENT (OUTPATIENT)
Dept: RADIOLOGY | Facility: MEDICAL CENTER | Age: 57
End: 2017-03-26
Attending: EMERGENCY MEDICINE
Payer: COMMERCIAL

## 2017-03-26 VITALS
RESPIRATION RATE: 16 BRPM | TEMPERATURE: 97.3 F | OXYGEN SATURATION: 97 % | WEIGHT: 270 LBS | BODY MASS INDEX: 35.78 KG/M2 | SYSTOLIC BLOOD PRESSURE: 124 MMHG | HEART RATE: 62 BPM | DIASTOLIC BLOOD PRESSURE: 58 MMHG | HEIGHT: 73 IN

## 2017-03-26 DIAGNOSIS — M79.671 RIGHT FOOT PAIN: ICD-10-CM

## 2017-03-26 PROCEDURE — 73630 X-RAY EXAM OF FOOT: CPT | Mod: RT

## 2017-03-26 PROCEDURE — 99284 EMERGENCY DEPT VISIT MOD MDM: CPT

## 2017-03-26 RX ORDER — MORPHINE SULFATE 15 MG/1
15 TABLET ORAL EVERY 4 HOURS PRN
Qty: 12 TAB | Refills: 0 | Status: SHIPPED | OUTPATIENT
Start: 2017-03-26 | End: 2017-05-25

## 2017-03-26 ASSESSMENT — PAIN SCALES - GENERAL
PAINLEVEL_OUTOF10: 5
PAINLEVEL_OUTOF10: 3

## 2017-03-26 ASSESSMENT — LIFESTYLE VARIABLES: DO YOU DRINK ALCOHOL: NO

## 2017-03-26 NOTE — DISCHARGE INSTRUCTIONS
Musculoskeletal Pain  Musculoskeletal pain is muscle and ab aches and pains. These pains can occur in any part of the body. Your caregiver may treat you without knowing the cause of the pain. They may treat you if blood or urine tests, X-rays, and other tests were normal.   CAUSES  There is often not a definite cause or reason for these pains. These pains may be caused by a type of germ (virus). The discomfort may also come from overuse. Overuse includes working out too hard when your body is not fit. Ab aches also come from weather changes. Bone is sensitive to atmospheric pressure changes.  HOME CARE INSTRUCTIONS   · Ask when your test results will be ready. Make sure you get your test results.  · Only take over-the-counter or prescription medicines for pain, discomfort, or fever as directed by your caregiver. If you were given medications for your condition, do not drive, operate machinery or power tools, or sign legal documents for 24 hours. Do not drink alcohol. Do not take sleeping pills or other medications that may interfere with treatment.  · Continue all activities unless the activities cause more pain. When the pain lessens, slowly resume normal activities. Gradually increase the intensity and duration of the activities or exercise.  · During periods of severe pain, bed rest may be helpful. Lay or sit in any position that is comfortable.  · Putting ice on the injured area.  ¨ Put ice in a bag.  ¨ Place a towel between your skin and the bag.  ¨ Leave the ice on for 15 to 20 minutes, 3 to 4 times a day.  · Follow up with your caregiver for continued problems and no reason can be found for the pain. If the pain becomes worse or does not go away, it may be necessary to repeat tests or do additional testing. Your caregiver may need to look further for a possible cause.  SEEK IMMEDIATE MEDICAL CARE IF:  · You have pain that is getting worse and is not relieved by medications.  · You develop chest pain  that is associated with shortness or breath, sweating, feeling sick to your stomach (nauseous), or throw up (vomit).  · Your pain becomes localized to the abdomen.  · You develop any new symptoms that seem different or that concern you.  MAKE SURE YOU:   · Understand these instructions.  · Will watch your condition.  · Will get help right away if you are not doing well or get worse.     This information is not intended to replace advice given to you by your health care provider. Make sure you discuss any questions you have with your health care provider.     Document Released: 12/18/2006 Document Revised: 03/11/2013 Document Reviewed: 08/22/2014  [employer] Interactive Patient Education ©2016 Elsevier Inc.

## 2017-03-26 NOTE — ED AVS SNAPSHOT
[employer] Access Code: [email or web address]  Expires: 4/4/2017 11:35 AM    [employer]  A secure, online tool to manage your health information     [employer]’s [employer]® is a secure, online tool that connects you to your personalized health information from the privacy of your home -- day or night - making it very easy for you to manage your healthcare. Once the activation process is completed, you can even access your medical information using the [employer] katiana, which is available for free in the Apple Katiana store or Google Play store.     [employer] provides the following levels of access (as shown below):   My Chart Features   [facility] Primary Care Doctor [facility]  Specialists [facility]  Urgent  Care Non-[facility]  Primary Care  Doctor   Email your healthcare team securely and privately 24/7 X X X X   Manage appointments: schedule your next appointment; view details of past/upcoming appointments X      Request prescription refills. X      View recent personal medical records, including lab and immunizations X X X X   View health record, including health history, allergies, medications X X X X   Read reports about your outpatient visits, procedures, consult and ER notes X X X X   See your discharge summary, which is a recap of your hospital and/or ER visit that includes your diagnosis, lab results, and care plan. X X       How to register for [employer]:  1. Go to  [email or web address].  2. Click on the Sign Up Now box, which takes you to the New Member Sign Up page. You will need to provide the following information:  a. Enter your [employer] Access Code exactly as it appears at the top of this page. (You will not need to use this code after you’ve completed the sign-up process. If you do not sign up before the expiration date, you must request a new code.)   b. Enter your date of birth.   c. Enter your home email address.   d. Click Submit, and follow the next screen’s instructions.  3. Create a [employer] ID. This will be your [employer]  login ID and cannot be changed, so think of one that is secure and easy to remember.  4. Create a [facility] password. You can change your password at any time.  5. Enter your Password Reset Question and Answer. This can be used at a later time if you forget your password.   6. Enter your e-mail address. This allows you to receive e-mail notifications when new information is available in [facility].  7. Click Sign Up. You can now view your health information.    For assistance activating your [facility] account, call [phone]

## 2017-03-26 NOTE — ED NOTES
Chief Complaint   Patient presents with   • Foot Pain     Pt BIB self/wheelchair for c/o right foot pain/ pt denies trauma/ pain described as pinpoint behind two smll toes/ pt denies hx of clots/ reports on blood thinners while in hospital. Pt w/recent admit to hospital for 1 wk. report to have arotic valve replacement tmr.      Explained to pt triage process, made pt aware to tell this RN of any changes/concerns, pt verbalized understanding of process and instructions given. Pt to ER lobby.

## 2017-03-26 NOTE — ED NOTES
Provided patient with discharge instructions and verbal education on prescription. Patient verbalized understanding of follow up care.

## 2017-03-26 NOTE — ED AVS SNAPSHOT
Home Care Instructions                                                                                                                Gagandeep Durbin Jr.   MRN: [id number]    Department:  [facility], Emergency Dept   Date of Visit:  3/26/2017            [facility], Emergency Dept    [address]    [place] [address]    Phone:  [phone]      You were seen by     Joe Brunner M.D.      Your Diagnosis Was     Right foot pain     M79.671       Follow-up Information     1. Follow up with FELIX Fournier In 1 day.    Specialty:  Family Medicine    Contact information    [address] [place] [address] [address] [address] [phone]        Medication Information     Review all of your home medications and newly ordered medications with your primary doctor and/or pharmacist as soon as possible. Follow medication instructions as directed by your doctor and/or pharmacist.     Please keep your complete medication list with you and share with your physician. Update the information when medications are discontinued, doses are changed, or new medications (including over-the-counter products) are added; and carry medication information at all times in the event of emergency situations.               Medication List      START taking these medications        Instructions    Morning Afternoon Evening Bedtime    morphine 15 MG tablet   Commonly known as:  MS IR        Take 1 Tab by mouth every four hours as needed for Severe Pain.   Dose:  15 mg                          ASK your doctor about these medications        Instructions    Morning Afternoon Evening Bedtime    aspirin EC 81 MG Tbec   Commonly known as:  ECOTRIN        Take 81 mg by mouth every day.   Dose:  81 mg                        Aug Betamethasone Dipropionate 0.05 % Crea   Commonly known as:  DIPROLENE-AF        Apply 1 g to affected area(s) 2 times a day.   Dose:  1 g                        carvedilol 3.125 MG Tabs      Commonly known as:  COREG        Take 1 Tab by mouth 2 times a day, with meals.   Dose:  3.125 mg                        levothyroxine 25 MCG Tabs   Commonly known as:  SYNTHROID        Take 1 Tab by mouth Every morning on an empty stomach.   Dose:  25 mcg                        lisinopril 5 MG Tabs   Commonly known as:  PRINIVIL        Take 1 Tab by mouth every day.   Dose:  5 mg                        pravastatin 20 MG Tabs   Commonly known as:  PRAVACHOL        TAKE TWO TABLETS BY MOUTH ONCE DAILY                        Vitamin D3 2000 UNITS Tabs        Take 2,000 Units by mouth every day.   Dose:  2000 Units                             Where to Get Your Medications      You can get these medications from any pharmacy     Bring a paper prescription for each of these medications    - morphine 15 MG tablet            Procedures and tests performed during your visit     DX-FOOT-COMPLETE 3+ RIGHT        Discharge Instructions       Musculoskeletal Pain  Musculoskeletal pain is muscle and ab aches and pains. These pains can occur in any part of the body. Your caregiver may treat you without knowing the cause of the pain. They may treat you if blood or urine tests, X-rays, and other tests were normal.   CAUSES  There is often not a definite cause or reason for these pains. These pains may be caused by a type of germ (virus). The discomfort may also come from overuse. Overuse includes working out too hard when your body is not fit. Ab aches also come from weather changes. Bone is sensitive to atmospheric pressure changes.  HOME CARE INSTRUCTIONS   · Ask when your test results will be ready. Make sure you get your test results.  · Only take over-the-counter or prescription medicines for pain, discomfort, or fever as directed by your caregiver. If you were given medications for your condition, do not drive, operate machinery or power tools, or sign legal documents for 24 hours. Do not drink alcohol. Do not take  sleeping pills or other medications that may interfere with treatment.  · Continue all activities unless the activities cause more pain. When the pain lessens, slowly resume normal activities. Gradually increase the intensity and duration of the activities or exercise.  · During periods of severe pain, bed rest may be helpful. Lay or sit in any position that is comfortable.  · Putting ice on the injured area.  ¨ Put ice in a bag.  ¨ Place a towel between your skin and the bag.  ¨ Leave the ice on for 15 to 20 minutes, 3 to 4 times a day.  · Follow up with your caregiver for continued problems and no reason can be found for the pain. If the pain becomes worse or does not go away, it may be necessary to repeat tests or do additional testing. Your caregiver may need to look further for a possible cause.  SEEK IMMEDIATE MEDICAL CARE IF:  · You have pain that is getting worse and is not relieved by medications.  · You develop chest pain that is associated with shortness or breath, sweating, feeling sick to your stomach (nauseous), or throw up (vomit).  · Your pain becomes localized to the abdomen.  · You develop any new symptoms that seem different or that concern you.  MAKE SURE YOU:   · Understand these instructions.  · Will watch your condition.  · Will get help right away if you are not doing well or get worse.     This information is not intended to replace advice given to you by your health care provider. Make sure you discuss any questions you have with your health care provider.     Document Released: 12/18/2006 Document Revised: 03/11/2013 Document Reviewed: 08/22/2014  Elsevier Interactive Patient Education ©2016 Elsevier Inc.            Patient Information     Patient Information    Following emergency treatment: all patient requiring follow-up care must return either to a private physician or a clinic if your condition worsens before you are able to obtain further medical attention, please return to the  emergency room.     Billing Information    At [facility], we work to make the billing process streamlined for our patients.  Our Representatives are here to answer any questions you may have regarding your hospital bill.  If you have insurance coverage and have supplied your insurance information to us, we will submit a claim to your insurer on your behalf.  Should you have any questions regarding your bill, we can be reached online or by phone as follows:  Online: You are able pay your bills online or live chat with our representatives about any billing questions you may have. We are here to help Monday - Friday from 8:00am to 7:30pm and 9:00am - 12:00pm on Saturdays.  Please visit [email or web address]  for more information.   Phone:  [phone] or [phone]    Please note that your emergency physician, surgeon, pathologist, radiologist, anesthesiologist, and other specialists are not employed by [facility] and will therefore bill separately for their services.  Please contact them directly for any questions concerning their bills at the numbers below:     Emergency Physician Services:  [phone]  [facility]:  [phone]  [facility]:  [phone]  [facility]:  [phone]    1. Your final bill may vary from the amount quoted upon discharge if all procedures are not complete at that time, or if your doctor has additional procedures of which we are not aware. You will receive an additional bill if you return to the Emergency Department at [facility] for suture removal regardless of the facility of which the sutures were placed.     2. Please arrange for settlement of this account at the emergency registration.    3. All self-pay accounts are due in full at the time of treatment.  If you are unable to meet this obligation then payment is expected within 4-5 days.     4. If you have had radiology studies (CT, X-ray,  Ultrasound, MRI), you have received a preliminary result during your emergency department visit. Please contact the radiology department [phone] to receive a copy of your final result. Please discuss the Final result with your primary physician or with the follow up physician provided.     Crisis Hotline:  [place] Crisis Hotline:  6-473-IDIQWSW or 1-863.871.7722  [place] Crisis Hotline:    1-393.524.4216 or 270-925-9100         ED Discharge Follow Up Questions    1. In order to provide you with very good care, we would like to follow up with a phone call in the next few days.  May we have your permission to contact you?     YES /  NO    2. What is the best phone number to call you? (       )_____-__________    3. What is the best time to call you?      Morning  /  Afternoon  /  Evening                   Patient Signature:  ____________________________________________________________    Date:  ____________________________________________________________      Your appointments     Apr 28, 2017  7:00 AM   Adult Draw/Collection with [facility] (--)    [address] [place] [id number] [phone]            Apr 28, 2017  9:20 AM   FOLLOW UP with Steve Ruelas M.D.   [facility] (--)    [address], [address]  [place] [id number] [phone]            May 05, 2017  8:00 AM   [facility]    [address] [place] [id number] [phone]           You will be receiving a confirmation call a few days before your appointment from our automated call confirmation system.

## 2017-03-26 NOTE — ED NOTES
Patient provided adult tall crutches, where he was advised proper technique.  He stated he understood and felt comfortable with operating on crutches.  Patient was in good spirits and wheeled out to the lobby by wheel chair.

## 2017-03-26 NOTE — ED AVS SNAPSHOT
3/26/2017          Gagandeep Castillo [place] [address]    Dear Gagandeep:    [facility] wants to ensure your discharge home is safe and you or your loved ones have had all your questions answered regarding your care after you leave the hospital.    You may receive a telephone call within two days of your discharge.  This call is to make certain you understand your discharge instructions as well as ensure we provided you with the best care possible during your stay with us.     The call will only last approximately 3-5 minutes and will be done by a nurse.    Once again, we want to ensure your discharge home is safe and that you have a clear understanding of any next steps in your care.  If you have any questions or concerns, please do not hesitate to contact us, we are here for you.  Thank you for choosing [facility] for your healthcare needs.    Sincerely,    Eros Mccormick    [facility]

## 2017-03-26 NOTE — ED PROVIDER NOTES
ED Provider Note    CHIEF COMPLAINT  Chief Complaint   Patient presents with   • Foot Pain     Pt BIB self/wheelchair for c/o right foot pain/ pt denies trauma/ pain described as pinpoint behind two smll toes/ pt denies hx of clots/ reports on blood thinners while in hospital. Pt w/recent admit to hospital for 1 wk. report to have arotic valve replacement tmr.        HPI  Gagandeep Durbin Jr. is a 57 y.o. male who presents for evaluation of right foot pain without obvious trauma. The patient states that he was recently admitted to the hospital chest pain, he actually has aortic valve replacement surgery coming up in 2 days, when he woke up 2 days ago he's had pain in the right foot with increasing degree of pain since. He describes the pain is localized to lateral aspect of the foot without weakness or numbness. He reports swelling. No calf pain or swelling, no other complaints offered by the patient at this time    REVIEW OF SYSTEMS  Negative for fever, weakness, numbness.    PAST MEDICAL HISTORY  Past Medical History   Diagnosis Date   • Shortness of breath 10/14/2014   • BMI 37.0-37.9, adult 10/14/2014   • Hyperlipidemia 10/14/2014   • Right knee pain 10/15/2014   • Dry skin 10/15/2014   • Accessory skin tags 10/15/2014   • Enlarged heart 10/15/2014   • Unspecified vitamin D deficiency 10/21/2014   • Hypertension    • Heart burn    • Indigestion    • Dental disorder      partial   • NSTEMI (non-ST elevated myocardial infarction) (CMS-[facility]) 2/26/2017       FAMILY HISTORY  Family History   Problem Relation Age of Onset   • Diabetes Mother    • Cancer Father    • Heart Disease Father    • Heart Failure Father        SOCIAL HISTORY  Social History   Substance Use Topics   • Smoking status: Former Smoker     Quit date: 05/14/2013   • Smokeless tobacco: Never Used   • Alcohol Use: Yes      Comment: 1-2 a night       SURGICAL HISTORY  Past Surgical History   Procedure Laterality Date   • Recovery  1/28/2015     Performed by  "[facility] at SURGERY SAME DAY [facility] ORS       CURRENT MEDICATIONS  I personally reviewed the medication list in the charting documentation.     ALLERGIES  No Known Allergies    MEDICAL RECORD  I have reviewed patient's medical record and pertinent results are listed above.      PHYSICAL EXAM  VITAL SIGNS: /76 mmHg  Pulse 52  Temp(Src) 36.3 °C (97.3 °F)  Resp 16  Ht 1.854 m (6' 0.99\")  Wt 122.471 kg (270 lb)  BMI 35.63 kg/m2  SpO2 97%   Constitutional: Alert in no apparent distress.  HENT: No signs of trauma.   Eyes: Conjunctiva normal, Non-icteric.   Chest: Normal nonlabored respirations  Skin: Warm, Dry, No erythema, No rash.   Extremities: No edema.   Musculoskeletal: Right foot has impressive tenderness along the lateral aspect corresponding to the region of the proximal 5th metatarsal. No erythema, there is some swelling noted however. Neurovascularly intact distally. There is no tenderness whatsoever of the 1st MTP joint. No erythema there. Normal dorsalis pedis pulse.  Neurologic: Alert, No focal deficits noted. Gait is normal.  Psychiatric: Affect normal, Judgment normal.    DIAGNOSTIC STUDIES / PROCEDURES    RADIOLOGY  DX-FOOT-COMPLETE 3+ RIGHT   Final Result      No acute findings.            COURSE & MEDICAL DECISION MAKING  I have reviewed any medical record information, laboratory studies and radiographic results as noted above.    Encounter Summary: This is a 57 y.o. male with right foot pain without obvious injury, on exam there is no signs concerning for infectious process although there is some swelling, this is not a neurovascular sort of a disease process. We'll obtain an x-ray to rule out occult fracture, otherwise will be placed on crutches, prescribed oral morphine and instructed to follow-up his primary care provider tomorrow prior to his open heart surgery Tuesday. [facility] aware database will be queried as I'm required to do.      DISPOSITION: Discharge home in stable " condition      FINAL IMPRESSION  1. Right foot pain           This dictation was created using voice recognition software. The accuracy of the dictation is limited to the abilities of the software. I expect there may be some errors of grammar and possibly content. The nursing notes were reviewed and certain aspects of this information were incorporated into this note.    Electronically signed by: Joe Brunner, 3/26/2017 11:41 AM

## 2017-03-27 ENCOUNTER — OFFICE VISIT (OUTPATIENT)
Dept: MEDICAL GROUP | Facility: PHYSICIAN GROUP | Age: 57
End: 2017-03-27
Payer: COMMERCIAL

## 2017-03-27 ENCOUNTER — HOSPITAL ENCOUNTER (OUTPATIENT)
Dept: LAB | Facility: MEDICAL CENTER | Age: 57
End: 2017-03-27
Attending: FAMILY MEDICINE
Payer: COMMERCIAL

## 2017-03-27 VITALS
OXYGEN SATURATION: 94 % | SYSTOLIC BLOOD PRESSURE: 114 MMHG | RESPIRATION RATE: 12 BRPM | WEIGHT: 270 LBS | HEIGHT: 73 IN | TEMPERATURE: 97.7 F | BODY MASS INDEX: 35.78 KG/M2 | HEART RATE: 56 BPM | DIASTOLIC BLOOD PRESSURE: 68 MMHG

## 2017-03-27 DIAGNOSIS — R60.0 LEG EDEMA, RIGHT: ICD-10-CM

## 2017-03-27 DIAGNOSIS — E66.9 OBESITY (BMI 30-39.9): ICD-10-CM

## 2017-03-27 DIAGNOSIS — M79.671 RIGHT FOOT PAIN: Primary | ICD-10-CM

## 2017-03-27 DIAGNOSIS — M79.671 RIGHT FOOT PAIN: ICD-10-CM

## 2017-03-27 LAB — URATE SERPL-MCNC: 8.9 MG/DL (ref 2.5–8.3)

## 2017-03-27 PROCEDURE — 36415 COLL VENOUS BLD VENIPUNCTURE: CPT

## 2017-03-27 PROCEDURE — 84550 ASSAY OF BLOOD/URIC ACID: CPT

## 2017-03-27 PROCEDURE — 99214 OFFICE O/P EST MOD 30 MIN: CPT | Performed by: FAMILY MEDICINE

## 2017-03-27 RX ORDER — PREDNISONE 20 MG/1
20 TABLET ORAL DAILY
Qty: 5 TAB | Refills: 0 | Status: SHIPPED | OUTPATIENT
Start: 2017-03-27 | End: 2017-03-30 | Stop reason: SDUPTHER

## 2017-03-27 NOTE — MR AVS SNAPSHOT
"        Gagandeep Durbin    3/27/2017 1:20 PM   Office Visit   MRN: [id number]    Department:  [facility]   Dept Phone:  [phone]    Description:  Male : 1960   Provider:  Sabina Baird M.D.           Reason for Visit     Foot Swelling rt foot  NKI      Allergies as of 3/27/2017     No Known Allergies      You were diagnosed with     Right foot pain   [584529]  -  Primary     Leg edema, right   [[id number]]         Vital Signs     Blood Pressure Pulse Temperature Respirations Height Weight    114/68 mmHg 56 36.5 °C (97.7 °F) 12 1.854 m (6' 0.99\") 122.471 kg (270 lb)    Body Mass Index Oxygen Saturation Smoking Status             35.63 kg/m2 94% Former Smoker         Basic Information     Date Of Birth Sex Race Ethnicity Preferred Language    1960 Male White Non- English      Your appointments     Mar 27, 2017  2:00 PM   Adult Draw/Collection with LAB [facility]   [facility] (--)    [facility] [address] [place] [address]   [phone]            2017  7:00 AM   Adult Draw/Collection with LAB [facility] OUT (--)    [facility] [address] [place] [id number] [phone]            2017  9:20 AM   FOLLOW UP with Steve Ruelas M.D.   [facility] (--)    [address] [place] [id number] [phone]            May 05, 2017  8:00 AM   Established Patient with FELIX Fournier   [facility]    [address] [place] [address] [place] [address] [phone]           You will be receiving a confirmation call a few days before your appointment from our automated call confirmation system.              Problem List              ICD-10-CM Priority Class Noted - Resolved    BMI 37.0-37.9, adult Z68.37   10/14/2014 - Present    Hyperlipidemia E78.5 Low  10/14/2014 - Present    Accessory skin tags Q82.8   10/15/2014 - Present    Vitamin D deficiency E55.9   10/21/2014 - Present    HTN (hypertension) " I10 High  10/30/2014 - Present    Abnormal EKG R94.31   11/24/2014 - Present    Aortic stenosis I35.0 Medium  1/20/2015 - Present    Hypothyroid E03.9 Low  12/6/2016 - Present    Need for Tdap vaccination Z23   2/10/2017 - Present    Alcoholism (CMS-HCC) F10.20   2/26/2017 - Present    Hypothyroidism E03.9   2/26/2017 - Present    Arthritis M19.90   2/26/2017 - Present    Chronic sinus bradycardia R00.1   2/26/2017 - Present    NSTEMI (non-ST elevated myocardial infarction) (CMS-HCC) I21.4   2/26/2017 - Present    Bradycardia R00.1 Medium  3/17/2017 - Present    Non-sustained ventricular tachycardia (CMS-HCC) I47.2 High  3/17/2017 - Present    Aortic stenosis due to bicuspid aortic valve Q23.1, I35.0 High  3/17/2017 - Present    Cardiomyopathy (CMS-HCC) I42.9 Medium  3/20/2017 - Present    Acute on chronic heart failure (CMS-HCC) I50.9   3/20/2017 - Present    PVC (premature ventricular contraction) I49.3 Medium  3/20/2017 - Present    Chest pain, rule out acute myocardial infarction R07.9 High  3/20/2017 - Present    Chronic combined systolic and diastolic CHF (congestive heart failure) (CMS-HCC) I50.42 Medium  3/21/2017 - Present    Severe aortic stenosis I35.0 High  3/21/2017 - Present    Renal insufficiency N28.9 Medium  3/21/2017 - Present      Health Maintenance        Date Due Completion Dates    IMM PNEUMOCOCCAL 19-64 (ADULT) MEDIUM RISK SERIES (1 of 1 - PPSV23) 3/25/1977 ---    COLONOSCOPY 10/1/2021 10/1/2016 (N/S), 3/1/2013 (N/S)    Override on 10/1/2016: (N/S)    Override on 3/1/2013: (N/S)    IMM DTaP/Tdap/Td Vaccine (2 - Td) 2/10/2027 2/10/2017            Current Immunizations     Influenza Vaccine Quad Inj (Pf) 10/15/2016    Tdap Vaccine 2/10/2017  8:30 AM      Below and/or attached are the medications your provider expects you to take. Review all of your home medications and newly ordered medications with your provider and/or pharmacist. Follow medication instructions as directed by your provider  and/or pharmacist. Please keep your medication list with you and share with your provider. Update the information when medications are discontinued, doses are changed, or new medications (including over-the-counter products) are added; and carry medication information at all times in the event of emergency situations     Allergies:  No Known Allergies          Medications  Valid as of: March 27, 2017 -  1:39 PM    Generic Name Brand Name Tablet Size Instructions for use    Aspirin (Tablet Delayed Response) ECOTRIN 81 MG Take 81 mg by mouth every day.        Betamethasone Dipropionate Aug (Cream) DIPROLENE-AF 0.05 % Apply 1 g to affected area(s) 2 times a day.        Carvedilol (Tab) COREG 3.125 MG Take 1 Tab by mouth 2 times a day, with meals.        Cholecalciferol (Tab) Vitamin D3 2000 UNITS Take 2,000 Units by mouth every day.        Levothyroxine Sodium (Tab) SYNTHROID 25 MCG Take 1 Tab by mouth Every morning on an empty stomach.        Lisinopril (Tab) PRINIVIL 5 MG Take 1 Tab by mouth every day.        Morphine Sulfate (Tab) MS IR 15 MG Take 1 Tab by mouth every four hours as needed for Severe Pain.        Pravastatin Sodium (Tab) PRAVACHOL 20 MG TAKE TWO TABLETS BY MOUTH ONCE DAILY        PredniSONE (Tab) DELTASONE 20 MG Take 1 Tab by mouth every day for 5 days.        .                 Medicines prescribed today were sent to:     [facility] [address] [place] [address] - [address] [place] [id number]    Phone: [phone] Fax: [phone]    Open 24 Hours?: No      Medication refill instructions:       If your prescription bottle indicates you have medication refills left, it is not necessary to call your provider’s office. Please contact your pharmacy and they will refill your medication.    If your prescription bottle indicates you do not have any refills left, you may request refills at any time through one of the following ways: The [email or web address] system (except  Urgent Care), by calling your provider’s office, or by asking your pharmacy to contact your provider’s office with a refill request. Medication refills are processed only during regular business hours and may not be available until the next business day. Your provider may request additional information or to have a follow-up visit with you prior to refilling your medication.   *Please Note: Medication refills are assigned a new Rx number when refilled electronically. Your pharmacy may indicate that no refills were authorized even though a new prescription for the same medication is available at the pharmacy. Please request the medicine by name with the pharmacy before contacting your provider for a refill.        Your To Do List     Future Labs/Procedures Complete By Expires    URIC ACID  As directed 3/28/2018    US-EXTREMITY VENOUS UNILATERAL-LOWER RIGHT  As directed 9/27/2017         [employer] Access Code: [email or web address]  Expires: 4/4/2017 11:35 AM    Days of Wonder  A secure, online tool to manage your health information     [employer]’s Days [employer] Wonder® is a secure, online tool that connects you to your personalized health information from the privacy of your home -- day or night - making it very easy for you to manage your healthcare. Once the activation process is completed, you can even access your medical information using the Days of Wonder katiana, which is available for free in the Apple Katiana store or Google Play store.     Days of Wonder provides the following levels of access (as shown below):   My Chart Features   [facility] Doctor [facility]  Urgent  Care Non-[facility]  Primary Care  Doctor   Email your healthcare team securely and privately 24/7 X X X    Manage appointments: schedule your next appointment; view details of past/upcoming appointments X      Request prescription refills. X      View recent personal medical records, including lab and immunizations X X X X   View health record, including health  history, allergies, medications X X X X   Read reports about your outpatient visits, procedures, consult and ER notes X X X X   See your discharge summary, which is a recap of your hospital and/or ER visit that includes your diagnosis, lab results, and care plan. X X       How to register for [employer]:  1. Go to  [email or web address].  2. Click on the Sign Up Now box, which takes you to the New Member Sign Up page. You will need to provide the following information:  a. Enter your [employer] Access Code exactly as it appears at the top of this page. (You will not need to use this code after you’ve completed the sign-up process. If you do not sign up before the expiration date, you must request a new code.)   b. Enter your date of birth.   c. Enter your home email address.   d. Click Submit, and follow the next screen’s instructions.  3. Create a [employer] ID. This will be your [employer] login ID and cannot be changed, so think of one that is secure and easy to remember.  4. Create a [employer] password. You can change your password at any time.  5. Enter your Password Reset Question and Answer. This can be used at a later time if you forget your password.   6. Enter your e-mail address. This allows you to receive e-mail notifications when new information is available in [employer].  7. Click Sign Up. You can now view your health information.    For assistance activating your [employer] account, call [phone]

## 2017-03-28 PROBLEM — E66.9 OBESITY (BMI 30-39.9): Status: ACTIVE | Noted: 2017-03-28

## 2017-03-28 RX ORDER — PRAVASTATIN SODIUM 20 MG
TABLET ORAL
Qty: 90 TAB | Refills: 0 | Status: SHIPPED | OUTPATIENT
Start: 2017-03-28 | End: 2017-05-04 | Stop reason: SDUPTHER

## 2017-03-28 NOTE — PROGRESS NOTES
Subjective:   CC: right foot pain    HPI:     Gagandeep Durbin Jr. is a 57 y.o. male, established patient of the clinic, presents with the following concerns:     1. Right foot pain, edema.   Pt was scheduled for open heart surgery today to replace aortic valve. However, the procedure was cancelled due to acute right foot pain. Pt states that he was in his normal state of health until 3-4 days ago. He was walking with his wife and notices diffuse right foot pain, redness, and edema, most noticeable at the lateral side and dorsum of the foot. He wore protected shoes, and did not remember any trauma to his foot. Pain escalates quickly the following days, and he was not able to ambulate without pain. He presented to the ED. Foot X-ray was negative for fracture. He was prescribed morphine, which he states that it does nothing for his symptoms. Because of upcoming surgery, he was instructed not to take NSAID.   Denies fever, chills, recent viral infection, hx of foot fractures/problems, posterior knee pain, hx of clotting disorders.       Current medicines (including changes today)  Current Outpatient Prescriptions   Medication Sig Dispense Refill   • predniSONE (DELTASONE) 20 MG Tab Take 1 Tab by mouth every day for 5 days. 5 Tab 0   • morphine (MS IR) 15 MG tablet Take 1 Tab by mouth every four hours as needed for Severe Pain. 12 Tab 0   • lisinopril (PRINIVIL) 5 MG Tab Take 1 Tab by mouth every day. 30 Tab 1   • carvedilol (COREG) 3.125 MG Tab Take 1 Tab by mouth 2 times a day, with meals. 60 Tab 1   • Cholecalciferol (VITAMIN D3) 2000 UNITS Tab Take 2,000 Units by mouth every day.     • Aug Betamethasone Dipropionate (DIPROLENE-AF) 0.05 % Cream Apply 1 g to affected area(s) 2 times a day.     • levothyroxine (SYNTHROID) 25 MCG Tab Take 1 Tab by mouth Every morning on an empty stomach. 90 Tab 0   • pravastatin (PRAVACHOL) 20 MG Tab TAKE TWO TABLETS BY MOUTH ONCE DAILY 90 Tab 1   • aspirin EC (ECOTRIN) 81 MG Tablet Delayed  "Response Take 81 mg by mouth every day.       No current facility-administered medications for this visit.     He  has a past medical history of Shortness of breath (10/14/2014); BMI 37.0-37.9, adult (10/14/2014); Hyperlipidemia (10/14/2014); Right knee pain (10/15/2014); Dry skin (10/15/2014); Accessory skin tags (10/15/2014); Enlarged heart (10/15/2014); Unspecified vitamin D deficiency (10/21/2014); Hypertension; Heart burn; Indigestion; Dental disorder; and NSTEMI (non-ST elevated myocardial infarction) (CMS-HCC) (2/26/2017). He also has no past medical history of Anxiety, Depression, Type II or unspecified type diabetes mellitus without mention of complication, not stated as uncontrolled, Cancer (CMS-HCC), Asthma, or Headache(784.0).    I personally reviewed patient's problem list, allergies, medications, family hx, social hx with patient and update EPIC.     REVIEW OF SYSTEMS:  CONSTITUTIONAL:  Denies night sweats, fatigue, malaise, lethargy, fever or chills.  RESPIRATORY:  Denies cough, wheeze, hemoptysis, or shortness of breath.  CARDIOVASCULAR:  Denies chest pains, palpitations, pedal edema  GASTROINTESTINAL:  Denies abdominal pain, nausea or vomiting, diarrhea, constipation, hematemesis, hematochezia, melena.     Objective:     Blood pressure 114/68, pulse 56, temperature 36.5 °C (97.7 °F), resp. rate 12, height 1.854 m (6' 0.99\"), weight 122.471 kg (270 lb), SpO2 94 %. Body mass index is 35.63 kg/(m^2).    Physical Exam:  Constitutional: awake, alert, in no distress.  Skin: Warm, dry, good turgor, no rashes, bruises, ulcers in visible areas.  Eye: conjunctiva clear, lids neg for edema or lesions.  Neck: Trachea midline, no masses, no thyromegaly. No cervical or supraclavicular lymphadenopathy  Respiratory: Unlabored respiratory effort, lungs clear to auscultation, no wheezes, no rhonchi.  Cardiovascular: Normal S1, S2, no murmur, no pedal edema.  Neuro: CN2-12 grossly intact. Strength 5/5, reflexes 2/4 in " all extremities, no sensory deficits.   Psych: Oriented x3, affect and mood wnl, intact judgement and insight.   MSK: right foot is warm and well perfused. Right ankle ROM is mildly limited compared to left due to pain and edema. Diffuse erythema and 1+ edema noted. Tenderness to palpation along the 3-5th metatarsals. First MTP joint is not tender to palpation. Neg Homans sign, no palpable cords noted at the right posterior knee. Left foot exam wnl.       Assessment and Plan:   The following treatment plan was discussed    1. Right foot pain  Acute right foot pain without hx of trauma. X-ray done by ED negative for fracture. Morphine is ineffective to manage pt's symptoms. He is afebrile, hemodynamically stable, non-toxic appearing. DDx include but not limited to: gout, DVT, hairline fractures, infection/inflammation. Plan:   - predniSONE (DELTASONE) 20 MG Tab; Take 1 Tab by mouth every day for 5 days.  Dispense: 5 Tab; Refill: 0  - URIC ACID; Future  - US-EXTREMITY VENOUS UNILATERAL-LOWER RIGHT; Future  - advised pt to seek medical attn if he develops respiratory symptoms.     2. Leg edema, right  - US-EXTREMITY VENOUS UNILATERAL-LOWER RIGHT; Future    3. Obesity (BMI 30-39.9)  - Patient identified as having weight management issue.  Appropriate orders and counseling given.      Sabina Baird M.D.      Followup: Return if symptoms worsen or fail to improve.    Please note that this dictation was created using voice recognition software. I have made every reasonable attempt to correct obvious errors, but I expect that there are errors of grammar and possibly content that I did not discover before finalizing the note.

## 2017-03-28 NOTE — TELEPHONE ENCOUNTER
Was the patient seen in the last year in this department? Yes     Does patient have an active prescription for medications requested? No     Received Request Via: Pharmacy      Pt met protocol?: Yes, last ov 3/27/17, last lipid panel 3/20/17   BP Readings from Last 1 Encounters:   03/27/17 114/68

## 2017-03-30 RX ORDER — PREDNISONE 20 MG/1
TABLET ORAL
Qty: 5 TAB | Refills: 0 | Status: ON HOLD | OUTPATIENT
Start: 2017-03-30 | End: 2017-04-14

## 2017-03-31 NOTE — CONSULTS
DATE OF SERVICE:  03/22/2017    This consultation is performed at the request of Dr. Ko Coffey for   evaluation of ventricular tachycardia.      HISTORY OF PRESENT ILLNESS:  This is a 56-year-old gentleman who had recent   admission for non-ST elevation MI and was discovered at that time to have   significant aortic stenosis.  He was doing generally well until the last few   months, he has been getting substernal chest pressure and dyspnea on exertion.    He has been having some dizzy spells with these episodes and his workup has   been revealed that he has an ejection fraction of 45% with relatively severe   aortic stenosis, may be low gradient severe AS, as his gradient has only been   20 mmHg, but his valve looks quite calcified, so he has been referred for   aortic valve surgery.  While in the hospital, he has had some runs of   ventricular tachycardia.  He had 20-beat run that was symptomatic causing   presyncope on 03/20.  Subsequently, he had a dobutamine stress echo that   showed that the gradient augments to 55 with dobutamine, so he has now been   referred for surgery and that surgery cannot be performed until next week   under ideal conditions, so the question is whether patient can return home or   should stay in the hospital or what exactly he should do.      PAST MEDICAL HISTORY:  Significant for the aortic stenosis;   hypercholesterolemia, treated with Pravachol and hypothyroidism, treated with   Synthroid.  He also takes daily aspirin.      MEDICATIONS:  Synthroid, Pravachol, aspirin.      ALLERGIES:  No known drug allergies.      FAMILY HISTORY:  No premature coronary artery disease.  No family history of   sudden death.      SOCIAL HISTORY:  He is a nonsmoker.  He is .      REVIEW OF SYSTEMS:  Unremarkable.  He has not had syncope.  He does get some   exertional chest discomfort as described in the HPI.  Full review of systems   is otherwise negative.      PHYSICAL EXAMINATION:    VITAL  SIGNS:  He is afebrile.  Vital signs are stable.  He has a temperature   of 97.2, pulse rate is 55, respiratory rate 18, blood pressure 117/78 and   satting 93% on room air.    HEENT:  Normocephalic, atraumatic.  Mucous membranes are moist.  Eyes,   extraocular movements intact.  Pupils equal.    NECK:  No jugular venous distention is appreciated.    HEART:  S1, S2.  Systolic ejection murmur.    LUNGS:  Clear to auscultation bilaterally.    ABDOMEN:  Pendulous, obese abdomen.  Soft, nontender, nondistended.    EXTREMITIES:  No clubbing, cyanosis or edema.    NEUROLOGIC:  Nonfocal.    PSYCHIATRIC:  He is alert, oriented and appropriate.      LABORATORY DATA:  Reviewing his data, I have reviewed the strips.  It is   consistent with ventricular tachycardia.  I have reviewed his presenting   electrocardiogram, he has frequent PVCs on the presenting electrocardiogram.    His QRS complexes show a left axis deviation and repolarization abnormalities,   probably consistent with LVH based on Romhilt-Koo criteria.  The PVCs   appeared to have a left bundle-branch block, inferior axis morphology and are   very likely outflow PVCs.  The VT may actually be an outflow VT.  The loading   of his heart may get him higher propensity for that to happen.  The labs are   significant for a creatinine of 1.28.  His potassium is 4.3.  Latest magnesium   is 2.0.  Echos have been described above.      ASSESSMENT AND PLAN:  This is a 57-year-old gentleman who is nearing aortic   valve surgery who has runs of ventricular arrhythmia, but is delayed for   surgery.  We have discussed his risk of sudden cardiac death.  He has a   nonzero risk of sudden cardiac death, but there are several options.  I   presented him the option of waiting in the hospital until time for surgery or   he could go home.  I think if he goes home, we can offer a LifeVest.  I think   that the chance that the LifeVest offers life saving therapy is less than 1%   in the  time between now and surgery.  I also think that with his large belly,   he will have issues wearing the LifeVest.  He agrees with this and decides   that he would prefer to go home without any LifeVest and he accepts the small   risk of sudden death.  We can change his carvedilol little to metoprolol to   give more beta-blocking with less vasodilation with his fixed stenosis, so he   may do better with just Toprol.  Patient is planning on returning next week   for his open heart surgery and again I think he is low risk for cardiac arrest   in that time, but if he would like, we can arrange for a LifeVest.  He is   declining for now.       ____________________________________     MD ANEL Kimball / NTS    DD:  03/31/2017 04:05:43  DT:  03/31/2017 04:35:51    D#:  [id number]  Job#:  [id number]

## 2017-04-06 PROCEDURE — 93005 ELECTROCARDIOGRAM TRACING: CPT | Performed by: THORACIC SURGERY (CARDIOTHORACIC VASCULAR SURGERY)

## 2017-04-07 ENCOUNTER — HOSPITAL ENCOUNTER (OUTPATIENT)
Dept: CARDIOLOGY | Facility: MEDICAL CENTER | Age: 57
End: 2017-04-07
Attending: INTERNAL MEDICINE
Payer: COMMERCIAL

## 2017-04-07 ENCOUNTER — HOSPITAL ENCOUNTER (OUTPATIENT)
Dept: RADIOLOGY | Facility: MEDICAL CENTER | Age: 57
End: 2017-04-07
Attending: THORACIC SURGERY (CARDIOTHORACIC VASCULAR SURGERY)
Payer: COMMERCIAL

## 2017-04-07 LAB
ABO GROUP BLD: NORMAL
ALBUMIN SERPL BCP-MCNC: 3.7 G/DL (ref 3.2–4.9)
ALBUMIN/GLOB SERPL: 1.3 G/DL
ALP SERPL-CCNC: 60 U/L (ref 30–99)
ALT SERPL-CCNC: 23 U/L (ref 2–50)
ANION GAP SERPL CALC-SCNC: 8 MMOL/L (ref 0–11.9)
APPEARANCE UR: CLEAR
APTT PPP: 25.7 SEC (ref 24.7–36)
AST SERPL-CCNC: 15 U/L (ref 12–45)
BASOPHILS # BLD AUTO: 0.8 % (ref 0–1.8)
BASOPHILS # BLD: 0.06 K/UL (ref 0–0.12)
BILIRUB SERPL-MCNC: 0.7 MG/DL (ref 0.1–1.5)
BILIRUB UR QL STRIP.AUTO: NEGATIVE
BLD GP AB SCN SERPL QL: NORMAL
BUN SERPL-MCNC: 18 MG/DL (ref 8–22)
CALCIUM SERPL-MCNC: 9.5 MG/DL (ref 8.5–10.5)
CHLORIDE SERPL-SCNC: 106 MMOL/L (ref 96–112)
CO2 SERPL-SCNC: 23 MMOL/L (ref 20–33)
COLOR UR: YELLOW
CREAT SERPL-MCNC: 1.01 MG/DL (ref 0.5–1.4)
EKG IMPRESSION: NORMAL
EOSINOPHIL # BLD AUTO: 0.2 K/UL (ref 0–0.51)
EOSINOPHIL NFR BLD: 2.6 % (ref 0–6.9)
EPI CELLS #/AREA URNS HPF: ABNORMAL /HPF
ERYTHROCYTE [DISTWIDTH] IN BLOOD BY AUTOMATED COUNT: 41.8 FL (ref 35.9–50)
EST. AVERAGE GLUCOSE BLD GHB EST-MCNC: 126 MG/DL
GFR SERPL CREATININE-BSD FRML MDRD: >60 ML/MIN/1.73 M 2
GLOBULIN SER CALC-MCNC: 2.9 G/DL (ref 1.9–3.5)
GLUCOSE SERPL-MCNC: 97 MG/DL (ref 65–99)
GLUCOSE UR STRIP.AUTO-MCNC: NEGATIVE MG/DL
HBA1C MFR BLD: 6 % (ref 0–5.6)
HCT VFR BLD AUTO: 45 % (ref 42–52)
HGB BLD-MCNC: 15.3 G/DL (ref 14–18)
IMM GRANULOCYTES # BLD AUTO: 0.03 K/UL (ref 0–0.11)
IMM GRANULOCYTES NFR BLD AUTO: 0.4 % (ref 0–0.9)
INR PPP: 0.95 (ref 0.87–1.13)
KETONES UR STRIP.AUTO-MCNC: NEGATIVE MG/DL
LEUKOCYTE ESTERASE UR QL STRIP.AUTO: ABNORMAL
LYMPHOCYTES # BLD AUTO: 1.67 K/UL (ref 1–4.8)
LYMPHOCYTES NFR BLD: 22.1 % (ref 22–41)
MCH RBC QN AUTO: 31.5 PG (ref 27–33)
MCHC RBC AUTO-ENTMCNC: 34 G/DL (ref 33.7–35.3)
MCV RBC AUTO: 92.8 FL (ref 81.4–97.8)
MICRO URNS: ABNORMAL
MONOCYTES # BLD AUTO: 0.44 K/UL (ref 0–0.85)
MONOCYTES NFR BLD AUTO: 5.8 % (ref 0–13.4)
NEUTROPHILS # BLD AUTO: 5.15 K/UL (ref 1.82–7.42)
NEUTROPHILS NFR BLD: 68.3 % (ref 44–72)
NITRITE UR QL STRIP.AUTO: NEGATIVE
NRBC # BLD AUTO: 0 K/UL
NRBC BLD AUTO-RTO: 0 /100 WBC
PH UR STRIP.AUTO: 5 [PH]
PLATELET # BLD AUTO: 207 K/UL (ref 164–446)
PMV BLD AUTO: 9.9 FL (ref 9–12.9)
POTASSIUM SERPL-SCNC: 4.7 MMOL/L (ref 3.6–5.5)
PROT SERPL-MCNC: 6.6 G/DL (ref 6–8.2)
PROT UR QL STRIP: NEGATIVE MG/DL
PROTHROMBIN TIME: 13 SEC (ref 12–14.6)
RBC # BLD AUTO: 4.85 M/UL (ref 4.7–6.1)
RBC # URNS HPF: ABNORMAL /HPF
RBC UR QL AUTO: NEGATIVE
RH BLD: NORMAL
SODIUM SERPL-SCNC: 137 MMOL/L (ref 135–145)
SP GR UR STRIP.AUTO: 1.01
WBC # BLD AUTO: 7.6 K/UL (ref 4.8–10.8)
WBC #/AREA URNS HPF: ABNORMAL /HPF

## 2017-04-07 PROCEDURE — 85730 THROMBOPLASTIN TIME PARTIAL: CPT

## 2017-04-07 PROCEDURE — 86900 BLOOD TYPING SEROLOGIC ABO: CPT

## 2017-04-07 PROCEDURE — 80053 COMPREHEN METABOLIC PANEL: CPT

## 2017-04-07 PROCEDURE — 81001 URINALYSIS AUTO W/SCOPE: CPT

## 2017-04-07 PROCEDURE — 85610 PROTHROMBIN TIME: CPT

## 2017-04-07 PROCEDURE — 86850 RBC ANTIBODY SCREEN: CPT

## 2017-04-07 PROCEDURE — 93010 ELECTROCARDIOGRAM REPORT: CPT | Performed by: INTERNAL MEDICINE

## 2017-04-07 PROCEDURE — 86901 BLOOD TYPING SEROLOGIC RH(D): CPT

## 2017-04-07 PROCEDURE — 83036 HEMOGLOBIN GLYCOSYLATED A1C: CPT

## 2017-04-07 PROCEDURE — 71020 DX-CHEST-2 VIEWS: CPT

## 2017-04-07 PROCEDURE — 85025 COMPLETE CBC W/AUTO DIFF WBC: CPT

## 2017-04-10 ENCOUNTER — RESOLUTE PROFESSIONAL BILLING HOSPITAL PROF FEE (OUTPATIENT)
Dept: OTHER | Facility: MEDICAL CENTER | Age: 57
End: 2017-04-10
Payer: COMMERCIAL

## 2017-04-10 ENCOUNTER — HOSPITAL ENCOUNTER (INPATIENT)
Facility: MEDICAL CENTER | Age: 57
LOS: 4 days | DRG: 220 | End: 2017-04-14
Attending: THORACIC SURGERY (CARDIOTHORACIC VASCULAR SURGERY) | Admitting: THORACIC SURGERY (CARDIOTHORACIC VASCULAR SURGERY)
Payer: COMMERCIAL

## 2017-04-10 ENCOUNTER — APPOINTMENT (OUTPATIENT)
Dept: RADIOLOGY | Facility: MEDICAL CENTER | Age: 57
DRG: 220 | End: 2017-04-10
Attending: THORACIC SURGERY (CARDIOTHORACIC VASCULAR SURGERY)
Payer: COMMERCIAL

## 2017-04-10 DIAGNOSIS — I35.0 AORTIC VALVE STENOSIS, UNSPECIFIED ETIOLOGY: ICD-10-CM

## 2017-04-10 LAB
ACT BLD: 121 SEC (ref 74–137)
ACT BLD: 137 SEC (ref 74–137)
ACT BLD: 446 SEC (ref 74–137)
ACT BLD: 492 SEC (ref 74–137)
ACT BLD: 569 SEC (ref 74–137)
ACT BLD: 621 SEC (ref 74–137)
APTT PPP: 27.3 SEC (ref 24.7–36)
BASE EXCESS BLDA CALC-SCNC: -2 MMOL/L (ref -4–3)
BASE EXCESS BLDA CALC-SCNC: -2 MMOL/L (ref -4–3)
BASE EXCESS BLDA CALC-SCNC: -3 MMOL/L (ref -4–3)
BASE EXCESS BLDA CALC-SCNC: -3 MMOL/L (ref -4–3)
BASE EXCESS BLDA CALC-SCNC: -4 MMOL/L (ref -4–3)
BASE EXCESS BLDA CALC-SCNC: -5 MMOL/L (ref -4–3)
BASE EXCESS BLDA CALC-SCNC: 0 MMOL/L (ref -4–3)
BASE EXCESS BLDV CALC-SCNC: -2 MMOL/L (ref -4–3)
BODY TEMPERATURE: ABNORMAL DEGREES
CA-I BLD ISE-SCNC: 0.98 MMOL/L (ref 1.1–1.3)
CA-I BLD ISE-SCNC: 1 MMOL/L (ref 1.1–1.3)
CA-I BLD ISE-SCNC: 1.03 MMOL/L (ref 1.1–1.3)
CA-I BLD ISE-SCNC: 1.03 MMOL/L (ref 1.1–1.3)
CA-I BLD ISE-SCNC: 1.04 MMOL/L (ref 1.1–1.3)
CA-I BLD ISE-SCNC: 1.06 MMOL/L (ref 1.1–1.3)
CA-I BLD ISE-SCNC: 1.16 MMOL/L (ref 1.1–1.3)
CO2 BLDA-SCNC: 22 MMOL/L (ref 20–33)
CO2 BLDA-SCNC: 23 MMOL/L (ref 20–33)
CO2 BLDA-SCNC: 24 MMOL/L (ref 20–33)
CO2 BLDA-SCNC: 25 MMOL/L (ref 20–33)
CO2 BLDA-SCNC: 26 MMOL/L (ref 20–33)
CO2 BLDV-SCNC: 25 MMOL/L (ref 20–33)
EKG IMPRESSION: NORMAL
GLUCOSE BLD-MCNC: 101 MG/DL (ref 65–99)
GLUCOSE BLD-MCNC: 116 MG/DL (ref 65–99)
GLUCOSE BLD-MCNC: 138 MG/DL (ref 65–99)
GLUCOSE BLD-MCNC: 144 MG/DL (ref 65–99)
GLUCOSE BLD-MCNC: 96 MG/DL (ref 65–99)
GLUCOSE BLD-MCNC: 99 MG/DL (ref 65–99)
HCO3 BLDA-SCNC: 20.5 MMOL/L (ref 17–25)
HCO3 BLDA-SCNC: 20.7 MMOL/L (ref 17–25)
HCO3 BLDA-SCNC: 21 MMOL/L (ref 17–25)
HCO3 BLDA-SCNC: 21.9 MMOL/L (ref 17–25)
HCO3 BLDA-SCNC: 22.4 MMOL/L (ref 17–25)
HCO3 BLDA-SCNC: 23 MMOL/L (ref 17–25)
HCO3 BLDA-SCNC: 23.1 MMOL/L (ref 17–25)
HCO3 BLDA-SCNC: 23.4 MMOL/L (ref 17–25)
HCO3 BLDA-SCNC: 25.2 MMOL/L (ref 17–25)
HCO3 BLDV-SCNC: 23.3 MMOL/L (ref 24–28)
HCT VFR BLD CALC: 32 % (ref 42–52)
HCT VFR BLD CALC: 35 % (ref 42–52)
HCT VFR BLD CALC: 36 % (ref 42–52)
HCT VFR BLD CALC: 42 % (ref 42–52)
HGB BLD-MCNC: 10.9 G/DL (ref 14–18)
HGB BLD-MCNC: 11.9 G/DL (ref 14–18)
HGB BLD-MCNC: 12.2 G/DL (ref 14–18)
HGB BLD-MCNC: 14.3 G/DL (ref 14–18)
INR PPP: 1.32 (ref 0.87–1.13)
MAGNESIUM SERPL-MCNC: 2.2 MG/DL (ref 1.5–2.5)
O2/TOTAL GAS SETTING VFR VENT: 100 %
O2/TOTAL GAS SETTING VFR VENT: 40 %
O2/TOTAL GAS SETTING VFR VENT: 50 %
O2/TOTAL GAS SETTING VFR VENT: 80 %
PCO2 BLDA: 36.3 MMHG (ref 26–37)
PCO2 BLDA: 36.9 MMHG (ref 26–37)
PCO2 BLDA: 39.1 MMHG (ref 26–37)
PCO2 BLDA: 39.5 MMHG (ref 26–37)
PCO2 BLDA: 40.5 MMHG (ref 26–37)
PCO2 BLDA: 41 MMHG (ref 26–37)
PCO2 BLDA: 42.1 MMHG (ref 26–37)
PCO2 BLDA: 43 MMHG (ref 26–37)
PCO2 BLDA: 44.2 MMHG (ref 26–37)
PCO2 BLDV: 41.6 MMHG (ref 41–51)
PCO2 TEMP ADJ BLDA: 35.5 MMHG (ref 26–37)
PCO2 TEMP ADJ BLDA: 36.4 MMHG (ref 26–37)
PCO2 TEMP ADJ BLDA: 39 MMHG (ref 26–37)
PCO2 TEMP ADJ BLDA: 39.1 MMHG (ref 26–37)
PCO2 TEMP ADJ BLDA: 39.5 MMHG (ref 26–37)
PCO2 TEMP ADJ BLDA: 40.5 MMHG (ref 26–37)
PCO2 TEMP ADJ BLDA: 41 MMHG (ref 26–37)
PCO2 TEMP ADJ BLDA: 43 MMHG (ref 26–37)
PCO2 TEMP ADJ BLDA: 43 MMHG (ref 26–37)
PCO2 TEMP ADJ BLDV: 41.6 MMHG (ref 41–51)
PH BLDA: 7.31 [PH] (ref 7.4–7.5)
PH BLDA: 7.33 [PH] (ref 7.4–7.5)
PH BLDA: 7.34 [PH] (ref 7.4–7.5)
PH BLDA: 7.35 [PH] (ref 7.4–7.5)
PH BLDA: 7.36 [PH] (ref 7.4–7.5)
PH BLDA: 7.38 [PH] (ref 7.4–7.5)
PH BLDA: 7.38 [PH] (ref 7.4–7.5)
PH BLDV: 7.36 [PH] (ref 7.31–7.45)
PH TEMP ADJ BLDA: 7.32 [PH] (ref 7.4–7.5)
PH TEMP ADJ BLDA: 7.34 [PH] (ref 7.4–7.5)
PH TEMP ADJ BLDA: 7.34 [PH] (ref 7.4–7.5)
PH TEMP ADJ BLDA: 7.36 [PH] (ref 7.4–7.5)
PH TEMP ADJ BLDA: 7.36 [PH] (ref 7.4–7.5)
PH TEMP ADJ BLDA: 7.37 [PH] (ref 7.4–7.5)
PH TEMP ADJ BLDA: 7.37 [PH] (ref 7.4–7.5)
PH TEMP ADJ BLDA: 7.38 [PH] (ref 7.4–7.5)
PH TEMP ADJ BLDA: 7.38 [PH] (ref 7.4–7.5)
PH TEMP ADJ BLDV: 7.36 [PH] (ref 7.31–7.45)
PLATELET # BLD AUTO: 148 K/UL (ref 164–446)
PO2 BLDA: 157 MMHG (ref 64–87)
PO2 BLDA: 211 MMHG (ref 64–87)
PO2 BLDA: 283 MMHG (ref 64–87)
PO2 BLDA: 289 MMHG (ref 64–87)
PO2 BLDA: 371 MMHG (ref 64–87)
PO2 BLDA: 75 MMHG (ref 64–87)
PO2 BLDA: 76 MMHG (ref 64–87)
PO2 BLDA: 87 MMHG (ref 64–87)
PO2 BLDA: 90 MMHG (ref 64–87)
PO2 BLDV: 51 MMHG (ref 25–40)
PO2 TEMP ADJ BLDA: 157 MMHG (ref 64–87)
PO2 TEMP ADJ BLDA: 208 MMHG (ref 64–87)
PO2 TEMP ADJ BLDA: 283 MMHG (ref 64–87)
PO2 TEMP ADJ BLDA: 289 MMHG (ref 64–87)
PO2 TEMP ADJ BLDA: 371 MMHG (ref 64–87)
PO2 TEMP ADJ BLDA: 71 MMHG (ref 64–87)
PO2 TEMP ADJ BLDA: 74 MMHG (ref 64–87)
PO2 TEMP ADJ BLDA: 85 MMHG (ref 64–87)
PO2 TEMP ADJ BLDA: 86 MMHG (ref 64–87)
PO2 TEMP ADJ BLDV: 51 MMHG (ref 25–40)
POTASSIUM BLD-SCNC: 3.9 MMOL/L (ref 3.6–5.5)
POTASSIUM BLD-SCNC: 3.9 MMOL/L (ref 3.6–5.5)
POTASSIUM BLD-SCNC: 4.2 MMOL/L (ref 3.6–5.5)
POTASSIUM BLD-SCNC: 4.5 MMOL/L (ref 3.6–5.5)
POTASSIUM BLD-SCNC: 4.7 MMOL/L (ref 3.6–5.5)
POTASSIUM BLD-SCNC: 4.8 MMOL/L (ref 3.6–5.5)
POTASSIUM BLD-SCNC: 5.2 MMOL/L (ref 3.6–5.5)
PROTHROMBIN TIME: 16.8 SEC (ref 12–14.6)
SAO2 % BLDA: 100 % (ref 93–99)
SAO2 % BLDA: 94 % (ref 93–99)
SAO2 % BLDA: 95 % (ref 93–99)
SAO2 % BLDA: 96 % (ref 93–99)
SAO2 % BLDA: 96 % (ref 93–99)
SAO2 % BLDA: 99 % (ref 93–99)
SAO2 % BLDV: 84 %
SODIUM BLD-SCNC: 135 MMOL/L (ref 135–145)
SODIUM BLD-SCNC: 136 MMOL/L (ref 135–145)
SODIUM BLD-SCNC: 136 MMOL/L (ref 135–145)
SODIUM BLD-SCNC: 137 MMOL/L (ref 135–145)
SODIUM BLD-SCNC: 138 MMOL/L (ref 135–145)
SODIUM BLD-SCNC: 138 MMOL/L (ref 135–145)
SODIUM BLD-SCNC: 139 MMOL/L (ref 135–145)
SPECIMEN DRAWN FROM PATIENT: ABNORMAL

## 2017-04-10 PROCEDURE — 99291 CRITICAL CARE FIRST HOUR: CPT | Performed by: INTERNAL MEDICINE

## 2017-04-10 PROCEDURE — 88311 DECALCIFY TISSUE: CPT

## 2017-04-10 PROCEDURE — 83735 ASSAY OF MAGNESIUM: CPT

## 2017-04-10 PROCEDURE — 93325 DOPPLER ECHO COLOR FLOW MAPG: CPT

## 2017-04-10 PROCEDURE — 500002 HCHG ADHESIVE, DERMABOND: Performed by: THORACIC SURGERY (CARDIOTHORACIC VASCULAR SURGERY)

## 2017-04-10 PROCEDURE — 500734 HCHG INSERT, STEALTH: Performed by: THORACIC SURGERY (CARDIOTHORACIC VASCULAR SURGERY)

## 2017-04-10 PROCEDURE — C1729 CATH, DRAINAGE: HCPCS | Performed by: THORACIC SURGERY (CARDIOTHORACIC VASCULAR SURGERY)

## 2017-04-10 PROCEDURE — 700101 HCHG RX REV CODE 250: Performed by: THORACIC SURGERY (CARDIOTHORACIC VASCULAR SURGERY)

## 2017-04-10 PROCEDURE — 501689: Performed by: THORACIC SURGERY (CARDIOTHORACIC VASCULAR SURGERY)

## 2017-04-10 PROCEDURE — 503000 HCHG SUTURE, OHS: Performed by: THORACIC SURGERY (CARDIOTHORACIC VASCULAR SURGERY)

## 2017-04-10 PROCEDURE — 503001 HCHG PERFUSION: Performed by: THORACIC SURGERY (CARDIOTHORACIC VASCULAR SURGERY)

## 2017-04-10 PROCEDURE — 700105 HCHG RX REV CODE 258: Performed by: NURSE PRACTITIONER

## 2017-04-10 PROCEDURE — 110371 HCHG SHELL REV 272: Performed by: THORACIC SURGERY (CARDIOTHORACIC VASCULAR SURGERY)

## 2017-04-10 PROCEDURE — 700111 HCHG RX REV CODE 636 W/ 250 OVERRIDE (IP)

## 2017-04-10 PROCEDURE — 160048 HCHG OR STATISTICAL LEVEL 1-5: Performed by: THORACIC SURGERY (CARDIOTHORACIC VASCULAR SURGERY)

## 2017-04-10 PROCEDURE — P9047 ALBUMIN (HUMAN), 25%, 50ML: HCPCS

## 2017-04-10 PROCEDURE — 700111 HCHG RX REV CODE 636 W/ 250 OVERRIDE (IP): Performed by: THORACIC SURGERY (CARDIOTHORACIC VASCULAR SURGERY)

## 2017-04-10 PROCEDURE — 93312 ECHO TRANSESOPHAGEAL: CPT

## 2017-04-10 PROCEDURE — 700102 HCHG RX REV CODE 250 W/ 637 OVERRIDE(OP): Performed by: NURSE PRACTITIONER

## 2017-04-10 PROCEDURE — C9248 INJ, CLEVIDIPINE BUTYRATE: HCPCS

## 2017-04-10 PROCEDURE — A9270 NON-COVERED ITEM OR SERVICE: HCPCS | Performed by: NURSE PRACTITIONER

## 2017-04-10 PROCEDURE — 501745 HCHG WIRE, SURGICAL STEEL: Performed by: THORACIC SURGERY (CARDIOTHORACIC VASCULAR SURGERY)

## 2017-04-10 PROCEDURE — 85014 HEMATOCRIT: CPT | Mod: 91

## 2017-04-10 PROCEDURE — 93005 ELECTROCARDIOGRAM TRACING: CPT | Performed by: NURSE PRACTITIONER

## 2017-04-10 PROCEDURE — 110382 HCHG SHELL REV 271: Performed by: THORACIC SURGERY (CARDIOTHORACIC VASCULAR SURGERY)

## 2017-04-10 PROCEDURE — 502627 HCHG HEMOSTAT, SURGICEL 4X4: Performed by: THORACIC SURGERY (CARDIOTHORACIC VASCULAR SURGERY)

## 2017-04-10 PROCEDURE — 503334: Performed by: THORACIC SURGERY (CARDIOTHORACIC VASCULAR SURGERY)

## 2017-04-10 PROCEDURE — 84295 ASSAY OF SERUM SODIUM: CPT | Mod: 91

## 2017-04-10 PROCEDURE — 501519 HCHG SUTURE, E PACK: Performed by: THORACIC SURGERY (CARDIOTHORACIC VASCULAR SURGERY)

## 2017-04-10 PROCEDURE — 700101 HCHG RX REV CODE 250

## 2017-04-10 PROCEDURE — 88305 TISSUE EXAM BY PATHOLOGIST: CPT

## 2017-04-10 PROCEDURE — 700102 HCHG RX REV CODE 250 W/ 637 OVERRIDE(OP): Performed by: THORACIC SURGERY (CARDIOTHORACIC VASCULAR SURGERY)

## 2017-04-10 PROCEDURE — 700111 HCHG RX REV CODE 636 W/ 250 OVERRIDE (IP): Performed by: NURSE PRACTITIONER

## 2017-04-10 PROCEDURE — A4606 OXYGEN PROBE USED W OXIMETER: HCPCS | Performed by: THORACIC SURGERY (CARDIOTHORACIC VASCULAR SURGERY)

## 2017-04-10 PROCEDURE — 700105 HCHG RX REV CODE 258

## 2017-04-10 PROCEDURE — 502240 HCHG MISC OR SUPPLY RC 0272: Performed by: THORACIC SURGERY (CARDIOTHORACIC VASCULAR SURGERY)

## 2017-04-10 PROCEDURE — 500385 HCHG DRAIN, PLEUROVAC ADUL: Performed by: THORACIC SURGERY (CARDIOTHORACIC VASCULAR SURGERY)

## 2017-04-10 PROCEDURE — 503033 HCHG COR-KNOT TITANIUM QUICKLOADS: Performed by: THORACIC SURGERY (CARDIOTHORACIC VASCULAR SURGERY)

## 2017-04-10 PROCEDURE — 37799 UNLISTED PX VASCULAR SURGERY: CPT

## 2017-04-10 PROCEDURE — 500890 HCHG PACK, OPEN HEART: Performed by: THORACIC SURGERY (CARDIOTHORACIC VASCULAR SURGERY)

## 2017-04-10 PROCEDURE — 770022 HCHG ROOM/CARE - ICU (200)

## 2017-04-10 PROCEDURE — 501506 HCHG SUTURE GUIDE, VALVE REPLACEMENT: Performed by: THORACIC SURGERY (CARDIOTHORACIC VASCULAR SURGERY)

## 2017-04-10 PROCEDURE — B24BZZ4 ULTRASONOGRAPHY OF HEART WITH AORTA, TRANSESOPHAGEAL: ICD-10-PCS | Performed by: THORACIC SURGERY (CARDIOTHORACIC VASCULAR SURGERY)

## 2017-04-10 PROCEDURE — 82330 ASSAY OF CALCIUM: CPT | Mod: 91

## 2017-04-10 PROCEDURE — 85730 THROMBOPLASTIN TIME PARTIAL: CPT

## 2017-04-10 PROCEDURE — 503050 HCHG COR-KNOT QUICK LOADS 6PACK: Performed by: THORACIC SURGERY (CARDIOTHORACIC VASCULAR SURGERY)

## 2017-04-10 PROCEDURE — 93321 DOPPLER ECHO F-UP/LMTD STD: CPT

## 2017-04-10 PROCEDURE — 5A1221Z PERFORMANCE OF CARDIAC OUTPUT, CONTINUOUS: ICD-10-PCS | Performed by: THORACIC SURGERY (CARDIOTHORACIC VASCULAR SURGERY)

## 2017-04-10 PROCEDURE — 160031 HCHG SURGERY MINUTES - 1ST 30 MINS LEVEL 5: Performed by: THORACIC SURGERY (CARDIOTHORACIC VASCULAR SURGERY)

## 2017-04-10 PROCEDURE — C9248 INJ, CLEVIDIPINE BUTYRATE: HCPCS | Performed by: NURSE PRACTITIONER

## 2017-04-10 PROCEDURE — 02RF08Z REPLACEMENT OF AORTIC VALVE WITH ZOOPLASTIC TISSUE, OPEN APPROACH: ICD-10-PCS | Performed by: THORACIC SURGERY (CARDIOTHORACIC VASCULAR SURGERY)

## 2017-04-10 PROCEDURE — C1898 LEAD, PMKR, OTHER THAN TRANS: HCPCS | Performed by: THORACIC SURGERY (CARDIOTHORACIC VASCULAR SURGERY)

## 2017-04-10 PROCEDURE — 94002 VENT MGMT INPAT INIT DAY: CPT

## 2017-04-10 PROCEDURE — 85049 AUTOMATED PLATELET COUNT: CPT

## 2017-04-10 PROCEDURE — 85610 PROTHROMBIN TIME: CPT

## 2017-04-10 PROCEDURE — 160042 HCHG SURGERY MINUTES - EA ADDL 1 MIN LEVEL 5: Performed by: THORACIC SURGERY (CARDIOTHORACIC VASCULAR SURGERY)

## 2017-04-10 PROCEDURE — 82947 ASSAY GLUCOSE BLOOD QUANT: CPT | Mod: 91

## 2017-04-10 PROCEDURE — 71010 DX-CHEST-PORTABLE (1 VIEW): CPT

## 2017-04-10 PROCEDURE — 82803 BLOOD GASES ANY COMBINATION: CPT | Mod: 91

## 2017-04-10 PROCEDURE — 94150 VITAL CAPACITY TEST: CPT

## 2017-04-10 PROCEDURE — 93010 ELECTROCARDIOGRAM REPORT: CPT | Performed by: INTERNAL MEDICINE

## 2017-04-10 PROCEDURE — 500112 HCHG BONE WAX: Performed by: THORACIC SURGERY (CARDIOTHORACIC VASCULAR SURGERY)

## 2017-04-10 PROCEDURE — 82962 GLUCOSE BLOOD TEST: CPT

## 2017-04-10 PROCEDURE — 160009 HCHG ANES TIME/MIN: Performed by: THORACIC SURGERY (CARDIOTHORACIC VASCULAR SURGERY)

## 2017-04-10 PROCEDURE — 700105 HCHG RX REV CODE 258: Performed by: THORACIC SURGERY (CARDIOTHORACIC VASCULAR SURGERY)

## 2017-04-10 PROCEDURE — 85347 COAGULATION TIME ACTIVATED: CPT

## 2017-04-10 PROCEDURE — C1894 INTRO/SHEATH, NON-LASER: HCPCS | Performed by: THORACIC SURGERY (CARDIOTHORACIC VASCULAR SURGERY)

## 2017-04-10 PROCEDURE — 84132 ASSAY OF SERUM POTASSIUM: CPT | Mod: 91

## 2017-04-10 DEVICE — IMPLANTABLE DEVICE: Type: IMPLANTABLE DEVICE | Status: FUNCTIONAL

## 2017-04-10 DEVICE — DEVICE CLOSURE STERNAL ZIPFIX: Type: IMPLANTABLE DEVICE | Status: FUNCTIONAL

## 2017-04-10 RX ORDER — LEVOTHYROXINE SODIUM 0.05 MG/1
25 TABLET ORAL
Status: DISCONTINUED | OUTPATIENT
Start: 2017-04-10 | End: 2017-04-14 | Stop reason: HOSPADM

## 2017-04-10 RX ORDER — DEXTROSE MONOHYDRATE 25 G/50ML
25 INJECTION, SOLUTION INTRAVENOUS PRN
Status: DISCONTINUED | OUTPATIENT
Start: 2017-04-10 | End: 2017-04-14 | Stop reason: HOSPADM

## 2017-04-10 RX ORDER — LIDOCAINE HYDROCHLORIDE 10 MG/ML
INJECTION, SOLUTION INFILTRATION; PERINEURAL
Status: COMPLETED
Start: 2017-04-10 | End: 2017-04-10

## 2017-04-10 RX ORDER — SODIUM CHLORIDE 9 MG/ML
INJECTION, SOLUTION INTRAVENOUS
Status: DISCONTINUED
Start: 2017-04-10 | End: 2017-04-11

## 2017-04-10 RX ORDER — BISACODYL 10 MG
10 SUPPOSITORY, RECTAL RECTAL
Status: DISCONTINUED | OUTPATIENT
Start: 2017-04-10 | End: 2017-04-14 | Stop reason: HOSPADM

## 2017-04-10 RX ORDER — NITROGLYCERIN 20 MG/100ML
0-100 INJECTION INTRAVENOUS CONTINUOUS
Status: DISCONTINUED | OUTPATIENT
Start: 2017-04-10 | End: 2017-04-11

## 2017-04-10 RX ORDER — OXYCODONE HYDROCHLORIDE 5 MG/1
5 TABLET ORAL
Status: DISCONTINUED | OUTPATIENT
Start: 2017-04-10 | End: 2017-04-14 | Stop reason: HOSPADM

## 2017-04-10 RX ORDER — PRAVASTATIN SODIUM 20 MG
40 TABLET ORAL EVERY EVENING
Status: DISCONTINUED | OUTPATIENT
Start: 2017-04-10 | End: 2017-04-14 | Stop reason: HOSPADM

## 2017-04-10 RX ORDER — ALUMINA, MAGNESIA, AND SIMETHICONE 2400; 2400; 240 MG/30ML; MG/30ML; MG/30ML
30 SUSPENSION ORAL EVERY 4 HOURS PRN
Status: DISCONTINUED | OUTPATIENT
Start: 2017-04-10 | End: 2017-04-14 | Stop reason: HOSPADM

## 2017-04-10 RX ORDER — ENEMA 19; 7 G/133ML; G/133ML
1 ENEMA RECTAL
Status: DISCONTINUED | OUTPATIENT
Start: 2017-04-10 | End: 2017-04-14 | Stop reason: HOSPADM

## 2017-04-10 RX ORDER — CARVEDILOL 3.12 MG/1
3.12 TABLET ORAL 2 TIMES DAILY WITH MEALS
Status: DISCONTINUED | OUTPATIENT
Start: 2017-04-11 | End: 2017-04-11

## 2017-04-10 RX ORDER — AMOXICILLIN 250 MG
1 CAPSULE ORAL NIGHTLY
Status: DISCONTINUED | OUTPATIENT
Start: 2017-04-10 | End: 2017-04-14 | Stop reason: HOSPADM

## 2017-04-10 RX ORDER — SODIUM CHLORIDE, SODIUM GLUCONATE, SODIUM ACETATE, POTASSIUM CHLORIDE AND MAGNESIUM CHLORIDE 526; 502; 368; 37; 30 MG/100ML; MG/100ML; MG/100ML; MG/100ML; MG/100ML
INJECTION, SOLUTION INTRAVENOUS PRN
Status: DISCONTINUED | OUTPATIENT
Start: 2017-04-10 | End: 2017-04-11

## 2017-04-10 RX ORDER — TRAMADOL HYDROCHLORIDE 50 MG/1
50 TABLET ORAL EVERY 4 HOURS PRN
Status: DISCONTINUED | OUTPATIENT
Start: 2017-04-10 | End: 2017-04-14 | Stop reason: HOSPADM

## 2017-04-10 RX ORDER — DOCUSATE SODIUM 100 MG/1
100 CAPSULE, LIQUID FILLED ORAL EVERY MORNING
Status: DISCONTINUED | OUTPATIENT
Start: 2017-04-10 | End: 2017-04-14 | Stop reason: HOSPADM

## 2017-04-10 RX ORDER — MORPHINE SULFATE 4 MG/ML
4 INJECTION, SOLUTION INTRAMUSCULAR; INTRAVENOUS
Status: DISCONTINUED | OUTPATIENT
Start: 2017-04-10 | End: 2017-04-11

## 2017-04-10 RX ORDER — SODIUM CHLORIDE 9 MG/ML
INJECTION, SOLUTION INTRAVENOUS CONTINUOUS
Status: DISCONTINUED | OUTPATIENT
Start: 2017-04-10 | End: 2017-04-14 | Stop reason: HOSPADM

## 2017-04-10 RX ORDER — DIPHENHYDRAMINE HCL 25 MG
25 TABLET ORAL
Status: DISCONTINUED | OUTPATIENT
Start: 2017-04-10 | End: 2017-04-14 | Stop reason: HOSPADM

## 2017-04-10 RX ORDER — ONDANSETRON 2 MG/ML
4 INJECTION INTRAMUSCULAR; INTRAVENOUS EVERY 6 HOURS PRN
Status: DISCONTINUED | OUTPATIENT
Start: 2017-04-10 | End: 2017-04-14 | Stop reason: HOSPADM

## 2017-04-10 RX ORDER — PROMETHAZINE HYDROCHLORIDE 25 MG/1
25 SUPPOSITORY RECTAL EVERY 6 HOURS PRN
Status: DISCONTINUED | OUTPATIENT
Start: 2017-04-10 | End: 2017-04-14 | Stop reason: HOSPADM

## 2017-04-10 RX ORDER — ESMOLOL HYDROCHLORIDE 10 MG/ML
0-300 INJECTION, SOLUTION INTRAVENOUS CONTINUOUS
Status: DISCONTINUED | OUTPATIENT
Start: 2017-04-10 | End: 2017-04-11

## 2017-04-10 RX ORDER — POTASSIUM CHLORIDE 7.45 MG/ML
10 INJECTION INTRAVENOUS ONCE
Status: COMPLETED | OUTPATIENT
Start: 2017-04-10 | End: 2017-04-10

## 2017-04-10 RX ORDER — LACTULOSE 20 G/30ML
30 SOLUTION ORAL
Status: DISCONTINUED | OUTPATIENT
Start: 2017-04-10 | End: 2017-04-14 | Stop reason: HOSPADM

## 2017-04-10 RX ORDER — ACETAMINOPHEN 325 MG/1
650 TABLET ORAL EVERY 4 HOURS PRN
Status: DISCONTINUED | OUTPATIENT
Start: 2017-04-10 | End: 2017-04-14 | Stop reason: HOSPADM

## 2017-04-10 RX ORDER — AMOXICILLIN 250 MG
1 CAPSULE ORAL
Status: DISCONTINUED | OUTPATIENT
Start: 2017-04-10 | End: 2017-04-14 | Stop reason: HOSPADM

## 2017-04-10 RX ORDER — MIDAZOLAM HYDROCHLORIDE 1 MG/ML
.5-2 INJECTION INTRAMUSCULAR; INTRAVENOUS
Status: DISCONTINUED | OUTPATIENT
Start: 2017-04-10 | End: 2017-04-11

## 2017-04-10 RX ORDER — OXYCODONE HYDROCHLORIDE 10 MG/1
10 TABLET ORAL
Status: DISCONTINUED | OUTPATIENT
Start: 2017-04-10 | End: 2017-04-14 | Stop reason: HOSPADM

## 2017-04-10 RX ORDER — ACETAMINOPHEN 650 MG/1
650 SUPPOSITORY RECTAL EVERY 4 HOURS PRN
Status: DISCONTINUED | OUTPATIENT
Start: 2017-04-10 | End: 2017-04-14 | Stop reason: HOSPADM

## 2017-04-10 RX ADMIN — POTASSIUM CHLORIDE 10 MEQ: 7.46 INJECTION, SOLUTION INTRAVENOUS at 19:33

## 2017-04-10 RX ADMIN — CLEVIDIPINE 1 MG/HR: 0.5 EMULSION INTRAVENOUS at 16:50

## 2017-04-10 RX ADMIN — SODIUM BICARBONATE 50 MEQ: 84 INJECTION, SOLUTION INTRAVENOUS at 16:52

## 2017-04-10 RX ADMIN — LIDOCAINE HYDROCHLORIDE: 10 INJECTION, SOLUTION INFILTRATION; PERINEURAL at 10:30

## 2017-04-10 RX ADMIN — SODIUM CHLORIDE: 9 INJECTION, SOLUTION INTRAVENOUS at 17:32

## 2017-04-10 RX ADMIN — STANDARDIZED SENNA CONCENTRATE AND DOCUSATE SODIUM 1 TABLET: 8.6; 5 TABLET, FILM COATED ORAL at 21:17

## 2017-04-10 RX ADMIN — MORPHINE SULFATE 4 MG: 4 INJECTION INTRAVENOUS at 19:33

## 2017-04-10 RX ADMIN — SODIUM CHLORIDE: 9 INJECTION, SOLUTION INTRAVENOUS at 15:45

## 2017-04-10 RX ADMIN — SODIUM CHLORIDE, SODIUM GLUCONATE, SODIUM ACETATE, POTASSIUM CHLORIDE AND MAGNESIUM CHLORIDE 1000 ML: 526; 502; 368; 37; 30 INJECTION, SOLUTION INTRAVENOUS at 23:35

## 2017-04-10 RX ADMIN — SODIUM CHLORIDE 1 UNITS/HR: 9 INJECTION, SOLUTION INTRAVENOUS at 16:40

## 2017-04-10 RX ADMIN — TRAMADOL HYDROCHLORIDE 50 MG: 50 TABLET, FILM COATED ORAL at 22:40

## 2017-04-10 RX ADMIN — SODIUM CHLORIDE: 9 INJECTION, SOLUTION INTRAVENOUS at 15:00

## 2017-04-10 RX ADMIN — PRAVASTATIN SODIUM 40 MG: 20 TABLET ORAL at 21:18

## 2017-04-10 RX ADMIN — MORPHINE SULFATE 4 MG: 4 INJECTION INTRAVENOUS at 17:51

## 2017-04-10 RX ADMIN — MAGNESIUM SULFATE IN DEXTROSE 1 G: 10 INJECTION, SOLUTION INTRAVENOUS at 17:50

## 2017-04-10 RX ADMIN — OXYCODONE HYDROCHLORIDE 5 MG: 5 TABLET ORAL at 21:18

## 2017-04-10 RX ADMIN — MUPIROCIN 1 APPLICATION: 20 OINTMENT TOPICAL at 10:25

## 2017-04-10 ASSESSMENT — LIFESTYLE VARIABLES
TOTAL SCORE: 1
TOTAL SCORE: 1
ON A TYPICAL DAY WHEN YOU DRINK ALCOHOL HOW MANY DRINKS DO YOU HAVE: 2
HAVE YOU EVER FELT YOU SHOULD CUT DOWN ON YOUR DRINKING: YES
HOW MANY TIMES IN THE PAST YEAR HAVE YOU HAD 5 OR MORE DRINKS IN A DAY: 10
AVERAGE NUMBER OF DAYS PER WEEK YOU HAVE A DRINK CONTAINING ALCOHOL: 3
EVER HAD A DRINK FIRST THING IN THE MORNING TO STEADY YOUR NERVES TO GET RID OF A HANGOVER: NO
HAVE PEOPLE ANNOYED YOU BY CRITICIZING YOUR DRINKING: NO
PACK_YEARS: 45
EVER_SMOKED: YES
EVER FELT BAD OR GUILTY ABOUT YOUR DRINKING: NO
CONSUMPTION TOTAL: POSITIVE
ALCOHOL_USE: YES
TOTAL SCORE: 1

## 2017-04-10 ASSESSMENT — PAIN SCALES - GENERAL
PAINLEVEL_OUTOF10: 6
PAINLEVEL_OUTOF10: 4
PAINLEVEL_OUTOF10: 8
PAINLEVEL_OUTOF10: 0

## 2017-04-10 ASSESSMENT — COPD QUESTIONNAIRES
HAVE YOU SMOKED AT LEAST 100 CIGARETTES IN YOUR ENTIRE LIFE: YES
DURING THE PAST 4 WEEKS HOW MUCH DID YOU FEEL SHORT OF BREATH: SOME OF THE TIME
DO YOU EVER COUGH UP ANY MUCUS OR PHLEGM?: NO/ONLY WITH OCCASIONAL COLDS OR INFECTIONS
COPD SCREENING SCORE: 5

## 2017-04-10 ASSESSMENT — PULMONARY FUNCTION TESTS: FVC: 1.2

## 2017-04-10 NOTE — IP AVS SNAPSHOT
4/14/2017    Gagandeep Durbin Jr.  [address] [place] [address]    Dear Gagandeep:    [facility] wants to ensure your discharge home is safe and you or your loved ones have had all of your questions answered regarding your care after you leave the hospital.    Below is a list of resources and contact information should you have any questions regarding your hospital stay, follow-up instructions, or active medical symptoms.    Questions or Concerns Regarding… Contact   Medical Questions Related to Your Discharge  (7 days a week, 8am-5pm) Contact a Nurse Care Coordinator   [phone]   Medical Questions Not Related to Your Discharge  (24 hours a day / 7 days a week)  Contact the Nurse Health Line   [phone]    Medications or Discharge Instructions Refer to your discharge packet   or contact your [facility] Primary Care Provider   [phone]   Follow-up Appointment(s) Schedule your appointment via Bile[employer]   or contact Scheduling [phone]   Billing Review your statement via Biletu  or contact Billing [phone]   Medical Records Review your records via Biletu   or contact Medical Records [phone]     You may receive a telephone call within two days of discharge. This call is to make certain you understand your discharge instructions and have the opportunity to have any questions answered. You can also easily access your medical information, test results and upcoming appointments via the Biletu free online health management tool. You can learn more and sign up at [facility]/Biletu. For assistance setting up your Biletu account, please call [phone].    Once again, we want to ensure your discharge home is safe and that you have a clear understanding of any next steps in your care. If you have any questions or concerns, please do not hesitate to contact us, we are here for you. Thank you for choosing [facility] for your healthcare needs.    Sincerely,    Your [facility] Healthcare Team

## 2017-04-10 NOTE — IP AVS SNAPSHOT
Voxeet Access Code: [email or web address]  Expires: 5/5/2017 11:49 AM    Voxeet  A secure, online tool to manage your health information     [employer]’s Voxeet® is a secure, online tool that connects you to your personalized health information from the privacy of your home -- day or night - making it very easy for you to manage your healthcare. Once the activation process is completed, you can even access your medical information using the Voxeet katiana, which is available for free in the Apple Katiana store or Google Play store.     Voxeet provides the following levels of access (as shown below):   My Chart Features   [facility] Primary Care Doctor [facility]  Specialists [facility]  Urgent  Care Non-[facility]  Primary Care  Doctor   Email your healthcare team securely and privately 24/7 X X X X   Manage appointments: schedule your next appointment; view details of past/upcoming appointments X      Request prescription refills. X      View recent personal medical records, including lab and immunizations X X X X   View health record, including health history, allergies, medications X X X X   Read reports about your outpatient visits, procedures, consult and ER notes X X X X   See your discharge summary, which is a recap of your hospital and/or ER visit that includes your diagnosis, lab results, and care plan. X X       How to register for Voxeet:  1. Go to  [email or web address].  2. Click on the Sign Up Now box, which takes you to the New Member Sign Up page. You will need to provide the following information:  a. Enter your Voxeet Access Code exactly as it appears at the top of this page. (You will not need to use this code after you’ve completed the sign-up process. If you do not sign up before the expiration date, you must request a new code.)   b. Enter your date of birth.   c. Enter your home email address.   d. Click Submit, and follow the next screen’s instructions.  3. Create a Voxeet ID. This will be your Voxeet  login ID and cannot be changed, so think of one that is secure and easy to remember.  4. Create a [employer]) password. You can change your password at any time.  5. Enter your Password Reset Question and Answer. This can be used at a later time if you forget your password.   6. Enter your e-mail address. This allows you to receive e-mail notifications when new information is available in [employer]  7. Click Sign Up. You can now view your health information.    For assistance activating your [employer]) account, call [phone]

## 2017-04-10 NOTE — OR SURGEON
Immediate Post-Operative Note      PreOp Diagnosis: AS    PostOp Diagnosis: AS    Procedure(s):  AVR (29mm Forbes Perimount Magna pericardial sara  KIARRA    Surgeon(s):  Aditya Carter M.D.    Assistants:  1.  Ko Coffey M.D.  2.  SHER San    Anesthesiologist/Type of Anesthesia:  Anesthesiologist: Davy Lackey M.D.  Anesthesia Technician: Cesario Kidd/General    Surgical Staff:  Assistant: FELIX Ramos  Circulator: Rachel Webb R.N.  Perfusionist: Anatoly Ahuja Circulator: Nahed Gary R.N.  Scrub Person: Eddi Tolliver R.N.; RACHEAL Jorge IV    Specimen: AV    Estimated Blood Loss: Min    Findings: Severe AS, bicuspid AV    Complications: None        4/10/2017 4:04 PM Aditya Carter

## 2017-04-10 NOTE — IP AVS SNAPSHOT
" Home Care Instructions                                                                                                                  Name:Gagandeep Durbin Jr.  Medical Record Number:[id number]  CSN: [id number]    YOB: 1960   Age: 57 y.o.  Sex: male  HT:1.854 m (6' 0.99\") WT: 127.9 kg (281 lb 15.5 oz)          Admit Date: 4/10/2017     Discharge Date:   Today's Date: 4/14/2017  Attending Doctor:  Aditya Carter M.D.                  Allergies:  Other misc            Discharge Instructions       Discharge Instructions    Discharged to home by car with relative. Discharged via wheelchair, hospital escort: Yes.  Special equipment needed: Not Applicable    Be sure to schedule a follow-up appointment with your primary care doctor or any specialists as instructed.     Discharge Plan:   Smoking Cessation Offered: Patient Refused  Influenza Vaccine Indication: Patient Refuses (Post OHS, will follow up in 6 weeks)    I understand that a diet low in cholesterol, fat, and sodium is recommended for good health. Unless I have been given specific instructions below for another diet, I accept this instruction as my diet prescription.   Other diet: cardiac    Special Instructions: heart surgery and coumadin    Cardiac Surgery Discharge Instructions/[facility]    Activity:  1. NO driving for 4 weeks after surgery. You may ride as a passenger.  2. NO lifting of any item over 10 lbs (e.g. gallon of milk) for 6 weeks after surgery.  Do not raise both arms above head, only one at a time.  Do not push or pull with your arms.  3. Walk as much as possible! Walk a minimum of 4 times per day. Depending on your fatigue and comfort level, you may walk as much as you wish. There is no maximum.  4. Other physical activities (sex, housework, gardening, etc.) are OK after 4 weeks or after 8 weeks if you want to golf (start by putting, then advance to chipping, then to driving).  5. Continue using incentive spirometer for 2 weeks.  " If you are going home on oxygen and you were not on oxygen prior to surgery, keep using until you are oxygen free.  6. Weigh daily and write it down starting  the next morning after you arrive home. Call your doctor for a weight gain of 2-4 lbs in 1-2 days.    Incision Care:  1. SHOWER EVERYDAY-no baths. Make sure to clean your incision(s) twice daily with plain, perfume and dye-free soap (if you shower in the morning, stand at the sink at bedtime and clean incision with soap and water). Then pat incision(s) dry with clean towel. Avoid creams or lotions on the incision(s).    2. If there is any increase in redness or swelling, or separation of the incision line, or thick drainage* from any of the incisions, call [facility] ([phone]). * Clear, thin drainage is not abnormal especially from the leg incision and/or chest tube sites.                    3. Continue to wear your GURMEET Stockings for 4 weeks on the leg(s) with the incision(s) or swelling. You may take off the stocking(s) when in bed or when the leg(s) are elevated.    General Instructions:  1. If you are on the blood thinner Coumadin (warfarin), you will need your coumadin levels checked periodically.  2. You have been referred to Cardiac Rehab which is highly recommended for you after heart surgery. You can start Cardiac Rehab 30 days after surgery.  If you do not have an appointment at the time of discharge call [phone] to schedule an appointment.  3. Your Primary Care Doctor typically handles Home Oxygen. The Home Oxygen service that drops off your tanks should be checking your oxygen saturation levels. Oxygen may be stopped when you are > 90 % saturated on room air. Check with your Primary Care Doctor if you are unsure.    Prescription Refills/Questions:   Call your usual Pharmacy for ALL refills   1. Pain medication questions only: Call [facility] at [phone].  (Remember that refills may require additional physician  approval and will need at least 24 hours’ notice. Please call for refills on Mondays through Fridays from 9 am to 4 pm).  2. For all other medications (except pain medication): Call your Cardiology Group or Primary Care Doctor (not [facility]) for refills or questions.    [facility] IS ALWAYS AVAILABLE TO ANSWER YOUR QUESTIONS. DO NOT HESITATE TO CALL!    · Is patient discharged on Warfarin / Coumadin?   Yes    You are on the blood thinner Coumadin (warfarin) and you will need your coumadin levels checked periodically. For any questions call the [facility] @ [phone]. The home health nurse will draw your blood and the coumadin clinic will call with any change to your dose.      IMPORTANT: HOW TO USE THIS INFORMATION:  This is a summary and does NOT have all possible information about this product. This information does not assure that this product is safe, effective, or appropriate for you. This information is not individual medical advice and does not substitute for the advice of your health care professional. Always ask your health care professional for complete information about this product and your specific health needs.      WARFARIN - ORAL (WARF-uh-rin)      COMMON BRAND NAME(S): Coumadin      WARNING:  Warfarin can cause very serious (possibly fatal) bleeding. This is more likely to occur when you first start taking this medication or if you take too much warfarin. To decrease your risk for bleeding, your doctor or other health care provider will monitor you closely and check your lab results (INR test) to make sure you are not taking too much warfarin. Keep all medical and laboratory appointments. Tell your doctor right away if you notice any signs of serious bleeding. See also Side Effects section.      USES:  This medication is used to treat blood clots (such as in deep vein thrombosis-DVT or pulmonary embolus-PE) and/or to prevent new clots from forming in your body.  "Preventing harmful blood clots helps to reduce the risk of a stroke or heart attack. Conditions that increase your risk of developing blood clots include a certain type of irregular heart rhythm (atrial fibrillation), heart valve replacement, recent heart attack, and certain surgeries (such as hip/knee replacement). Warfarin is commonly called a \"blood thinner,\" but the more correct term is \"anticoagulant.\" It helps to keep blood flowing smoothly in your body by decreasing the amount of certain substances (clotting proteins) in your blood.      HOW TO USE:  Read the Medication Guide provided by your pharmacist before you start taking warfarin and each time you get a refill. If you have any questions, ask your doctor or pharmacist. Take this medication by mouth with or without food as directed by your doctor or other health care professional, usually once a day. It is very important to take it exactly as directed. Do not increase the dose, take it more frequently, or stop using it unless directed by your doctor. Dosage is based on your medical condition, laboratory tests (such as INR), and response to treatment. Your doctor or other health care provider will monitor you closely while you are taking this medication to determine the right dose for you. Use this medication regularly to get the most benefit from it. To help you remember, take it at the same time each day. It is important to eat a balanced, consistent diet while taking warfarin. Some foods can affect how warfarin works in your body and may affect your treatment and dose. Avoid sudden large increases or decreases in your intake of foods high in vitamin K (such as broccoli, cauliflower, cabbage, brussels sprouts, kale, spinach, and other green leafy vegetables, liver, green tea, certain vitamin supplements). If you are trying to lose weight, check with your doctor before you try to go on a diet. Cranberry products may also affect how your warfarin works. " Limit the amount of cranberry juice (16 ounces/480 milliliters a day) or other cranberry products you may drink or eat.      SIDE EFFECTS:  Nausea, loss of appetite, or stomach/abdominal pain may occur. If any of these effects persist or worsen, tell your doctor or pharmacist promptly. Remember that your doctor has prescribed this medication because he or she has judged that the benefit to you is greater than the risk of side effects. Many people using this medication do not have serious side effects. This medication can cause serious bleeding if it affects your blood clotting proteins too much (shown by unusually high INR lab results). Even if your doctor stops your medication, this risk of bleeding can continue for up to a week. Tell your doctor right away if you have any signs of serious bleeding, including: unusual pain/swelling/discomfort, unusual/easy bruising, prolonged bleeding from cuts or gums, persistent/frequent nosebleeds, unusually heavy/prolonged menstrual flow, pink/dark urine, coughing up blood, vomit that is bloody or looks like coffee grounds, severe headache, dizziness/fainting, unusual or persistent tiredness/weakness, bloody/black/tarry stools, chest pain, shortness of breath, difficulty swallowing. Tell your doctor right away if any of these unlikely but serious side effects occur: persistent nausea/vomiting, severe stomach/abdominal pain, yellowing eyes/skin. This drug rarely has caused very serious (possibly fatal) problems if its effects lead to small blood clots (usually at the beginning of treatment). This can lead to severe skin/tissue damage that may require surgery or amputation if left untreated. Patients with certain blood conditions (protein C or S deficiency) may be at greater risk. Get medical help right away if any of these rare but serious side effects occur: painful/red/purplish patches on the skin (such as on the toe, breast, abdomen), change in the amount of urine, vision  changes, confusion, slurred speech, weakness on one side of the body. A very serious allergic reaction to this drug is rare. However, get medical help right away if you notice any symptoms of a serious allergic reaction, including: rash, itching/swelling (especially of the face/tongue/throat), severe dizziness, trouble breathing. This is not a complete list of possible side effects. If you notice other effects not listed above, contact your doctor or pharmacist. In the [place] - Call your doctor for medical advice about side effects. You may report side effects to FDA at 4-068-UTN-4680. In [place] - Call your doctor for medical advice about side effects. You may report side effects to [facility] [place] at 1-662.831.4430.      PRECAUTIONS:  Before taking warfarin, tell your doctor or pharmacist if you are allergic to it; or if you have any other allergies. This product may contain inactive ingredients, which can cause allergic reactions or other problems. Talk to your pharmacist for more details. Before using this medication, tell your doctor or pharmacist your medical history, especially of: blood disorders (such as anemia, hemophilia), bleeding problems (such as bleeding of the stomach/intestines, bleeding in the brain), blood vessel disorders (such as aneurysms), recent major injury/surgery, liver disease, alcohol use, mental/mood disorders (including memory problems), frequent falls/injuries. It is important that all your doctors and dentists know that you take warfarin. Before having surgery or any medical/dental procedures, tell your doctor or dentist that you are taking this medication and about all the products you use (including prescription drugs, nonprescription drugs, and herbal products). Avoid getting injections into the muscles. If you must have an injection into a muscle (for example, a flu shot), it should be given in the arm. This way, it will be easier to check for bleeding and/or apply pressure bandages.  This medication may cause stomach bleeding. Daily use of alcohol while using this medicine will increase your risk for stomach bleeding and may also affect how this medication works. Limit or avoid alcoholic beverages. If you have not been eating well, if you have an illness or infection that causes fever, vomiting, or diarrhea for more than 2 days, or if you start using any antibiotic medications, contact your doctor or pharmacist immediately because these conditions can affect how warfarin works. This medication can cause heavy bleeding. To lower the chance of getting cut, bruised, or injured, use great caution with sharp objects like safety razors and nail cutters. Use an electric razor when shaving and a soft toothbrush when brushing your teeth. Avoid activities such as contact sports. If you fall or injure yourself, especially if you hit your head, call your doctor immediately. Your doctor may need to check you. The Food & Drug Administration has stated that generic warfarin products are interchangeable. However, consult your doctor or pharmacist before switching warfarin products. Be careful not to take more than one medication that contains warfarin unless specifically directed by the doctor or health care provider who is monitoring your warfarin treatment. Older adults may be at greater risk for bleeding while using this drug. This medication is not recommended for use during pregnancy because of serious (possibly fatal) harm to an unborn baby. Discuss the use of reliable forms of birth control with your doctor. If you become pregnant or think you may be pregnant, tell your doctor immediately. If you are planning pregnancy, discuss a plan for managing your condition with your doctor before you become pregnant. Your doctor may switch the type of medication you use during pregnancy. Very small amounts of this medication may pass into breast milk but is unlikely to harm a nursing infant. Consult your doctor  "before breast-feeding.      DRUG INTERACTIONS:  Drug interactions may change how your medications work or increase your risk for serious side effects. This document does not contain all possible drug interactions. Keep a list of all the products you use (including prescription/nonprescription drugs and herbal products) and share it with your doctor and pharmacist. Do not start, stop, or change the dosage of any medicines without your doctor's approval. Warfarin interacts with many prescription, nonprescription, vitamin, and herbal products. This includes medications that are applied to the skin or inside the vagina or rectum. The interactions with warfarin usually result in an increase or decrease in the \"blood-thinning\" (anticoagulant) effect. Your doctor or other health care professional should closely monitor you to prevent serious bleeding or clotting problems. While taking warfarin, it is very important to tell your doctor or pharmacist of any changes in medications, vitamins, or herbal products that you are taking. Some products that may interact with this drug include: capecitabine, imatinib, mifepristone. Aspirin, aspirin-like drugs (salicylates), and nonsteroidal anti-inflammatory drugs (NSAIDs such as ibuprofen, naproxen, celecoxib) may have effects similar to warfarin. These drugs may increase the risk of bleeding problems if taken during treatment with warfarin. Carefully check all prescription/nonprescription product labels (including drugs applied to the skin such as pain-relieving creams) since the products may contain NSAIDs or salicylates. Talk to your doctor about using a different medication (such as acetaminophen) to treat pain/fever. Low-dose aspirin and related drugs (such as clopidogrel, ticlopidine) should be continued if prescribed by your doctor for specific medical reasons such as heart attack or stroke prevention. Consult your doctor or pharmacist for more details. Many herbal products " interact with warfarin. Tell your doctor before taking any herbal products, especially bromelains, coenzyme Q10, cranberry, danshen, dong quai, fenugreek, garlic, ginkgo biloba, ginseng, and Hobbs's wort, among others. This medication may interfere with a certain laboratory test to measure theophylline levels, possibly causing false test results. Make sure laboratory personnel and all your doctors know you use this drug.      OVERDOSE:  If overdose is suspected, contact a poison control center or emergency room immediately. [place] residents can call the  National Poison Hotline at 1-301.611.6879. [place] residents can call a provincial poison control center. Symptoms of overdose may include: bloody/black/tarry stools, pink/dark urine, unusual/prolonged bleeding.      NOTES:  Do not share this medication with others. Laboratory and/or medical tests (such as INR, complete blood count) must be performed periodically to monitor your progress or check for side effects. Consult your doctor for more details.      MISSED DOSE:  For the best possible benefit, do not miss any doses. If you do miss a dose and remember on the same day, take it as soon as you remember. If you remember on the next day, skip the missed dose and resume your usual dosing schedule. Do not double the dose to catch up because this could increase your risk for bleeding. Keep a record of missed doses to give to your doctor or pharmacist. Contact your doctor or pharmacist if you miss 2 or more doses in a row.      STORAGE:  Store at room temperature away from light and moisture. Do not store in the bathroom. Keep all medications away from children and pets. Do not flush medications down the toilet or pour them into a drain unless instructed to do so. Properly discard this product when it is  or no longer needed. Consult your pharmacist or local waste disposal company for more details about how to safely discard your product.      MEDICAL ALERT:   Your condition and medication can cause complications in a medical emergency. For information about enrolling in MedicAlert, call 1-799.671.2329 ([place]) or 1-352.171.1445 ([place]).      Information last revised October 2010 Copyright(c) 2010 [employer]      · Is patient Post Blood Transfusion?  No  Depression / Suicide Risk    As you are discharged from this [facility] facility, it is important to learn how to keep safe from harming yourself.    Recognize the warning signs:  · Abrupt changes in personality, positive or negative- including increase in energy   · Giving away possessions  · Change in eating patterns- significant weight changes-  positive or negative  · Change in sleeping patterns- unable to sleep or sleeping all the time   · Unwillingness or inability to communicate  · Depression  · Unusual sadness, discouragement and loneliness  · Talk of wanting to die  · Neglect of personal appearance   · Rebelliousness- reckless behavior  · Withdrawal from people/activities they love  · Confusion- inability to concentrate     If you or a loved one observes any of these behaviors or has concerns about self-harm, here's what you can do:  · Talk about it- your feelings and reasons for harming yourself  · Remove any means that you might use to hurt yourself (examples: pills, rope, extension cords, firearm)  · Get professional help from the community (Mental Health, Substance Abuse, psychological counseling)  · Do not be alone:Call your Safe Contact- someone whom you trust who will be there for you.  · Call your local CRISIS HOTLINE 899-7163 or 947-717-4934  · Call your local Children's Mobile Crisis Response Team [place] (737) 791-8607 or www.[employer]  · Call the toll free National Suicide Prevention Hotlines   · National Suicide Prevention Lifeline 118-229-OKTF (0789)  · National Hope Line Network 800-SUICIDE (743-6097)        Your appointments     Apr 28, 2017  7:00 AM   Adult Draw/Collection  with [facility]   [facility] (--)    [address]   [place] [address]   [phone]            Apr 28, 2017  9:20 AM   FOLLOW UP with Steve Ruelas M.D.   [facility] (--)    [address]  [place] [id number] [phone]            May 05, 2017  8:00 AM   Established Patient with FELIX Fournier   [facility] [place]    [address]  [place] [id number] [phone]           You will be receiving a confirmation call a few days before your appointment from our automated call confirmation system.              Follow-up Information     1. Follow up with Aditya Carter M.D. On 5/8/2017.    Specialty:  Cardiac Surgery    Why:  1:00 pm    Contact information    [id number]  [place] [address]  [phone]           Discharge Medication Instructions:    Below are the medications your physician expects you to take upon discharge:    Review all your home medications and newly ordered medications with your doctor and/or pharmacist. Follow medication instructions as directed by your doctor and/or pharmacist.    Please keep your medication list with you and share with your physician.               Medication List      START taking these medications        Instructions    Morning Afternoon Evening Bedtime    furosemide 40 MG Tabs   Commonly known as:  LASIX        Take 1 Tab by mouth every day.   Dose:  40 mg                        oxycodone immediate release 10 MG immediate release tablet   Last time this was given:  10 mg on 4/14/2017 11:30 AM   Commonly known as:  ROXICODONE        Take 1 Tab by mouth every 3 hours as needed (Severe Pain (NRS Pain Scale 7-10; CPOT Pain Scale 6-8)).   Dose:  10 mg                        potassium chloride SA 20 MEQ Tbcr   Last time this was given:  20 mEq on 4/14/2017  8:12 AM   Commonly known as:  Kdur        Take 1 Tab by mouth 2 times a day.   Dose:  20 mEq                           senna-docusate 8.6-50 MG Tabs   Last time this was given:  1 Tab on 4/13/2017  9:30 PM   Commonly known as:  PERICOLACE or SENOKOT S        Take 1 Tab by mouth every day.   Dose:  1 Tab                        warfarin 7.5 MG Tabs   Last time this was given:  7.5 mg on 4/13/2017  5:07 PM   Commonly known as:  COUMADIN        Take 1 Tab by mouth COUMADIN-DAILY.   Dose:  7.5 mg                          CONTINUE taking these medications        Instructions    Morning Afternoon Evening Bedtime    aspirin EC 81 MG Tbec   Last time this was given:  81 mg on 4/14/2017  8:11 AM   Commonly known as:  ECOTRIN        Take 81 mg by mouth every day.   Dose:  81 mg                        Aug Betamethasone Dipropionate 0.05 % Crea   Commonly known as:  DIPROLENE-AF        Apply 1 g to affected area(s) 2 times a day.   Dose:  1 g                        carvedilol 3.125 MG Tabs   Last time this was given:  3.125 mg on 4/14/2017  8:12 AM   Commonly known as:  COREG        Take 1 Tab by mouth 2 times a day, with meals.   Dose:  3.125 mg                        levothyroxine 25 MCG Tabs   Last time this was given:  25 mcg on 4/14/2017  6:00 AM   Commonly known as:  SYNTHROID        Take 1 Tab by mouth Every morning on an empty stomach.   Dose:  25 mcg                        lisinopril 5 MG Tabs   Last time this was given:  5 mg on 4/14/2017  8:12 AM   Commonly known as:  PRINIVIL        Take 1 Tab by mouth every day.   Dose:  5 mg                        morphine 15 MG tablet   Commonly known as:  MS IR        Take 1 Tab by mouth every four hours as needed for Severe Pain.   Dose:  15 mg                        pravastatin 20 MG Tabs   Last time this was given:  40 mg on 4/13/2017  9:31 PM   Commonly known as:  PRAVACHOL        TAKE TWO TABLETS BY MOUTH ONCE DAILY                        Vitamin D3 2000 UNITS Tabs        Take 2,000 Units by mouth every day.   Dose:  2000 Units                          STOP taking these medications      predniSONE 20 MG Tabs   Commonly known as:  DELTASONE                    Where to Get Your Medications      These medications were sent to [id number] - [place], [place] - [address]  [address] [address]     Phone:  [phone]    - furosemide 40 MG Tabs  - potassium chloride SA 20 MEQ Tbcr  - senna-docusate 8.6-50 MG Tabs  - warfarin 7.5 MG Tabs      Information about where to get these medications is not yet available     ! Ask your nurse or doctor about these medications    - oxycodone immediate release 10 MG immediate release tablet            Orders for after discharge     DME O2 New Set Up    Complete by:  As directed        REFERRAL TO ANTICOAGULATION MONITORING    Complete by:  As directed    If this Referral to the anticoagulation clinic is being ordered with a Referral to home health, then schedule the anticoagulation visit after the home health treatments are completed.       REFERRAL TO HOME HEALTH    Complete by:  As directed    Home health will create and establish a plan of care       REFERRAL TO INTENSIVE CARDIAC REHAB/CARDIAC REHAB    Complete by:  As directed    Qualifying Diagnosis for Cardiac Rehab:    -Myocardial Infarction  -Old Myocardial Infarction  -Coronary Artery Bypass Surgery  -Stable Angina Pectoris  -PTCA Status with or without Stents  -Heart Valve Replaced by other means  -Heart Transplant   -Aneurysm Repair    Provider Exercise Prescription for Treatment Plan:  -Outpatient Cardiac Rehab (CR)/Intensive Cardiac Rehab (ICR), duration based on patient progress 1-3 times per week up to a total of 36/72 sessions over a period of up to 18/36 weeks    -Progressive interval exercise training for 20-45 minutes, 1-3 times per week in Cardiac Rehab utilizing Treadmill, Elliptical, Stationary Cycling, Arm Ergometer, other conditioning activities and appropriate home program supplement    -Light resistance training 2-4 weeks post PTCA, 2-4 weeks post MI,  or 4-6 weeks post CABG, 4-6 weeks post aneurysm repair (20 # max)    -% TARGET HEART RATE OR MET’s:        RPE of 11-16 on a scale of 6-20       GXT Data:  40% - 80% exercise capacity using %HR max       HR below ischemic threshold (if determined, HR restriction)    -Education to promote an active healthy lifestyle and reduction of personal health risk factors    -Follow [facility] Policies and Procedures for Phase II CR/ICR             Instructions           Diet / Nutrition:    Follow any diet instructions given to you by your doctor or the dietician, including how much salt (sodium) you are allowed each day.    If you are overweight, talk to your doctor about a weight reduction plan.    Activity:    Remain physically active following your doctor's instructions about exercise and activity.    Rest often.     Any time you become even a little tired or short of breath, SIT DOWN and rest.    Worsening Symptoms:    Report any of the following signs and symptoms to the doctor's office immediately:    *Pain of jaw, arm, or neck  *Chest pain not relieved by medication                               *Dizziness or loss of consciousness  *Difficulty breathing even when at rest   *More tired than usual                                       *Bleeding drainage or swelling of surgical site  *Swelling of feet, ankles, legs or stomach                 *Fever (>100ºF)  *Pink or blood tinged sputum  *Weight gain (3lbs/day or 5lbs /week)           *Shock from internal defibrillator (if applicable)  *Palpitations or irregular heartbeats                *Cool and/or numb extremities    Stroke Awareness    Common Risk Factors for Stroke include:    Age  Atrial Fibrillation  Carotid Artery Stenosis  Diabetes Mellitus  Excessive alcohol consumption  High blood pressure  Overweight   Physical inactivity  Smoking    Warning signs and symptoms of a stroke include:    *Sudden numbness or weakness of the face, arm or leg (especially on one side of  the body).  *Sudden confusion, trouble speaking or understanding.  *Sudden trouble seeing in one or both eyes.  *Sudden trouble walking, dizziness, loss of balance or coordination.Sudden severe headache with no known cause.    It is very important to get treatment quickly when a stroke occurs. If you experience any of the above warning signs, call 911 immediately.                   Disclaimer         Quit Smoking / Tobacco Use:    I understand the use of any tobacco products increases my chance of suffering from future heart disease or stroke and could cause other illnesses which may shorten my life. Quitting the use of tobacco products is the single most important thing I can do to improve my health. For further information on smoking / tobacco cessation call a Toll Free Quit Line at 1-310.437.1431 (*[employer] [place]) or 1-490.761.2847 (American [employer] Association) or you can access the web based program at www.lungCITYBIZLIST.org.    [place] Tobacco Users Help Line:  (382) 804-7497       Toll Free: 1-819.919.8093  Quit Tobacco Program [facility] (327)839-2532    Crisis Hotline:    [place] Crisis Hotline:  9-825-OZFEHHV or 1-955.589.3512    [place] Crisis Hotline:    1-380.539.8561 or 877-422-8871    Discharge Survey:   Thank you for choosing [facility]. We hope we did everything we could to make your hospital stay a pleasant one. You may be receiving a phone survey and we would appreciate your time and participation in answering the questions. Your input is very valuable to us in our efforts to improve our service to our patients and their families.        My signature on this form indicates that:    1. I have reviewed and understand the above information.  2. My questions regarding this information have been answered to my satisfaction.  3. I have formulated a plan with my discharge nurse to obtain my prescribed medications for home.                  Disclaimer              __________________________________                     __________       ________                       Patient Signature                                                 Date                    Time

## 2017-04-10 NOTE — IP AVS SNAPSHOT
" <p align=\"LEFT\"><IMG SRC=\"//[email or web address]$/[email or web address]\" alt=\"Image\" WIDTH=\"50%\" HEIGHT=\"200\" BORDER=\"\"></p>                   Name:Gagandeep Durbin Jr.  Medical Record Number:[id number]  CSN: [id number]    YOB: 1960   Age: 57 y.o.  Sex: male  HT:1.854 m (6' 0.99\") WT: 127.9 kg (281 lb 15.5 oz)          Admit Date: 4/10/2017     Discharge Date:   Today's Date: 4/14/2017  Attending Doctor:  Aditya Carter M.D.                  Allergies:  Other misc          Your appointments     Apr 28, 2017  7:00 AM   Adult Draw/Collection with [facility] (--)    [address] [place] [address] [address] [address] [phone]            Apr 28, 2017  9:20 AM   FOLLOW UP with Steve Ruelas M.D.   [facility] (--)    [address] [place] [address] [address]  [facility] [address] [phone]            May 05, 2017  8:00 AM   Established Patient with FELIX Fournier   [facility] [place]    [address] [place] [address] [place] [address] [phone]           You will be receiving a confirmation call a few days before your appointment from our automated call confirmation system.              Follow-up Information     1. Follow up with Aditya Carter M.D. On 5/8/2017.    Specialty:  Cardiac Surgery    Why:  1:00 pm    Contact information    [id number]  [place] [id number] [phone]           Medication List      Take these Medications        Instructions    aspirin EC 81 MG Tbec   Commonly known as:  ECOTRIN    Take 81 mg by mouth every day.   Dose:  81 mg       Aug Betamethasone Dipropionate 0.05 % Crea   Commonly known as:  DIPROLENE-AF    Apply 1 g to affected area(s) 2 times a day.   Dose:  1 g       carvedilol 3.125 MG Tabs   Commonly known as:  COREG    Take 1 Tab by mouth 2 times a day, with meals.   Dose:  3.125 mg       furosemide 40 MG Tabs   Commonly known as:  LASIX    Take 1 Tab by mouth every day.   Dose:  40 mg       levothyroxine 25 MCG Tabs   "   Commonly known as:  SYNTHROID    Take 1 Tab by mouth Every morning on an empty stomach.   Dose:  25 mcg       lisinopril 5 MG Tabs   Commonly known as:  PRINIVIL    Take 1 Tab by mouth every day.   Dose:  5 mg       morphine 15 MG tablet   Commonly known as:  MS IR    Take 1 Tab by mouth every four hours as needed for Severe Pain.   Dose:  15 mg       oxycodone immediate release 10 MG immediate release tablet   Commonly known as:  ROXICODONE    Take 1 Tab by mouth every 3 hours as needed (Severe Pain (NRS Pain Scale 7-10; CPOT Pain Scale 6-8)).   Dose:  10 mg       potassium chloride SA 20 MEQ Tbcr   Commonly known as:  Kdur    Take 1 Tab by mouth 2 times a day.   Dose:  20 mEq       pravastatin 20 MG Tabs   Commonly known as:  PRAVACHOL    TAKE TWO TABLETS BY MOUTH ONCE DAILY       senna-docusate 8.6-50 MG Tabs   Commonly known as:  PERICOLACE or SENOKOT S    Take 1 Tab by mouth every day.   Dose:  1 Tab       Vitamin D3 2000 UNITS Tabs    Take 2,000 Units by mouth every day.   Dose:  2000 Units       warfarin 7.5 MG Tabs   Commonly known as:  COUMADIN    Take 1 Tab by mouth COUMADIN-DAILY.   Dose:  7.5 mg

## 2017-04-11 ENCOUNTER — APPOINTMENT (OUTPATIENT)
Dept: RADIOLOGY | Facility: MEDICAL CENTER | Age: 57
DRG: 220 | End: 2017-04-11
Attending: NURSE PRACTITIONER
Payer: COMMERCIAL

## 2017-04-11 LAB
ANION GAP SERPL CALC-SCNC: 5 MMOL/L (ref 0–11.9)
BASE EXCESS BLDA CALC-SCNC: -2 MMOL/L (ref -4–3)
BODY TEMPERATURE: ABNORMAL DEGREES
BUN SERPL-MCNC: 19 MG/DL (ref 8–22)
CALCIUM SERPL-MCNC: 7.6 MG/DL (ref 8.5–10.5)
CHLORIDE SERPL-SCNC: 104 MMOL/L (ref 96–112)
CO2 BLDA-SCNC: 23 MMOL/L (ref 20–33)
CO2 SERPL-SCNC: 23 MMOL/L (ref 20–33)
CREAT SERPL-MCNC: 0.76 MG/DL (ref 0.5–1.4)
EKG IMPRESSION: NORMAL
ERYTHROCYTE [DISTWIDTH] IN BLOOD BY AUTOMATED COUNT: 43.8 FL (ref 35.9–50)
GFR SERPL CREATININE-BSD FRML MDRD: >60 ML/MIN/1.73 M 2
GLUCOSE BLD-MCNC: 104 MG/DL (ref 65–99)
GLUCOSE BLD-MCNC: 107 MG/DL (ref 65–99)
GLUCOSE BLD-MCNC: 115 MG/DL (ref 65–99)
GLUCOSE BLD-MCNC: 115 MG/DL (ref 65–99)
GLUCOSE BLD-MCNC: 118 MG/DL (ref 65–99)
GLUCOSE BLD-MCNC: 125 MG/DL (ref 65–99)
GLUCOSE BLD-MCNC: 125 MG/DL (ref 65–99)
GLUCOSE BLD-MCNC: 131 MG/DL (ref 65–99)
GLUCOSE BLD-MCNC: 139 MG/DL (ref 65–99)
GLUCOSE BLD-MCNC: 143 MG/DL (ref 65–99)
GLUCOSE BLD-MCNC: 143 MG/DL (ref 65–99)
GLUCOSE BLD-MCNC: 150 MG/DL (ref 65–99)
GLUCOSE BLD-MCNC: 154 MG/DL (ref 65–99)
GLUCOSE BLD-MCNC: 157 MG/DL (ref 65–99)
GLUCOSE BLD-MCNC: 171 MG/DL (ref 65–99)
GLUCOSE BLD-MCNC: 76 MG/DL (ref 65–99)
GLUCOSE BLD-MCNC: 91 MG/DL (ref 65–99)
GLUCOSE SERPL-MCNC: 120 MG/DL (ref 65–99)
HCO3 BLDA-SCNC: 22.4 MMOL/L (ref 17–25)
HCT VFR BLD AUTO: 36.7 % (ref 42–52)
HGB BLD-MCNC: 12.6 G/DL (ref 14–18)
INR PPP: 1.1 (ref 0.87–1.13)
MCH RBC QN AUTO: 31.8 PG (ref 27–33)
MCHC RBC AUTO-ENTMCNC: 34.3 G/DL (ref 33.7–35.3)
MCV RBC AUTO: 92.7 FL (ref 81.4–97.8)
O2/TOTAL GAS SETTING VFR VENT: 7 %
PCO2 BLDA: 35 MMHG (ref 26–37)
PCO2 TEMP ADJ BLDA: 34.9 MMHG (ref 26–37)
PH BLDA: 7.41 [PH] (ref 7.4–7.5)
PH TEMP ADJ BLDA: 7.42 [PH] (ref 7.4–7.5)
PLATELET # BLD AUTO: 143 K/UL (ref 164–446)
PMV BLD AUTO: 10.5 FL (ref 9–12.9)
PO2 BLDA: 57 MMHG (ref 64–87)
PO2 TEMP ADJ BLDA: 57 MMHG (ref 64–87)
POTASSIUM SERPL-SCNC: 4 MMOL/L (ref 3.6–5.5)
POTASSIUM SERPL-SCNC: 4.4 MMOL/L (ref 3.6–5.5)
POTASSIUM SERPL-SCNC: 4.7 MMOL/L (ref 3.6–5.5)
PROTHROMBIN TIME: 14.6 SEC (ref 12–14.6)
RBC # BLD AUTO: 3.96 M/UL (ref 4.7–6.1)
SAO2 % BLDA: 90 % (ref 93–99)
SODIUM SERPL-SCNC: 132 MMOL/L (ref 135–145)
SPECIMEN DRAWN FROM PATIENT: ABNORMAL
WBC # BLD AUTO: 17.6 K/UL (ref 4.8–10.8)

## 2017-04-11 PROCEDURE — 99233 SBSQ HOSP IP/OBS HIGH 50: CPT | Performed by: INTERNAL MEDICINE

## 2017-04-11 PROCEDURE — 770020 HCHG ROOM/CARE - TELE (206)

## 2017-04-11 PROCEDURE — 71010 DX-CHEST-PORTABLE (1 VIEW): CPT

## 2017-04-11 PROCEDURE — 94667 MNPJ CHEST WALL 1ST: CPT

## 2017-04-11 PROCEDURE — 85610 PROTHROMBIN TIME: CPT

## 2017-04-11 PROCEDURE — 82962 GLUCOSE BLOOD TEST: CPT | Mod: 91

## 2017-04-11 PROCEDURE — 700105 HCHG RX REV CODE 258: Performed by: NURSE PRACTITIONER

## 2017-04-11 PROCEDURE — 306310 ANTI-EMBOLISM STOCKINGS XXLRG REG: Performed by: THORACIC SURGERY (CARDIOTHORACIC VASCULAR SURGERY)

## 2017-04-11 PROCEDURE — C9248 INJ, CLEVIDIPINE BUTYRATE: HCPCS | Performed by: NURSE PRACTITIONER

## 2017-04-11 PROCEDURE — 700112 HCHG RX REV CODE 229: Performed by: NURSE PRACTITIONER

## 2017-04-11 PROCEDURE — 700111 HCHG RX REV CODE 636 W/ 250 OVERRIDE (IP): Performed by: NURSE PRACTITIONER

## 2017-04-11 PROCEDURE — 36600 WITHDRAWAL OF ARTERIAL BLOOD: CPT

## 2017-04-11 PROCEDURE — 94668 MNPJ CHEST WALL SBSQ: CPT

## 2017-04-11 PROCEDURE — 80048 BASIC METABOLIC PNL TOTAL CA: CPT

## 2017-04-11 PROCEDURE — 84132 ASSAY OF SERUM POTASSIUM: CPT

## 2017-04-11 PROCEDURE — A9270 NON-COVERED ITEM OR SERVICE: HCPCS | Performed by: NURSE PRACTITIONER

## 2017-04-11 PROCEDURE — 700102 HCHG RX REV CODE 250 W/ 637 OVERRIDE(OP)

## 2017-04-11 PROCEDURE — 93010 ELECTROCARDIOGRAM REPORT: CPT | Performed by: INTERNAL MEDICINE

## 2017-04-11 PROCEDURE — 82803 BLOOD GASES ANY COMBINATION: CPT

## 2017-04-11 PROCEDURE — A9270 NON-COVERED ITEM OR SERVICE: HCPCS

## 2017-04-11 PROCEDURE — 700111 HCHG RX REV CODE 636 W/ 250 OVERRIDE (IP): Performed by: INTERNAL MEDICINE

## 2017-04-11 PROCEDURE — 85027 COMPLETE CBC AUTOMATED: CPT

## 2017-04-11 PROCEDURE — 93005 ELECTROCARDIOGRAM TRACING: CPT | Performed by: NURSE PRACTITIONER

## 2017-04-11 PROCEDURE — 700102 HCHG RX REV CODE 250 W/ 637 OVERRIDE(OP): Performed by: NURSE PRACTITIONER

## 2017-04-11 RX ORDER — FUROSEMIDE 10 MG/ML
20 INJECTION INTRAMUSCULAR; INTRAVENOUS
Status: DISCONTINUED | OUTPATIENT
Start: 2017-04-11 | End: 2017-04-11

## 2017-04-11 RX ORDER — CARVEDILOL 6.25 MG/1
6.25 TABLET ORAL 2 TIMES DAILY WITH MEALS
Status: DISCONTINUED | OUTPATIENT
Start: 2017-04-11 | End: 2017-04-11

## 2017-04-11 RX ORDER — POTASSIUM CHLORIDE 7.45 MG/ML
10 INJECTION INTRAVENOUS ONCE
Status: COMPLETED | OUTPATIENT
Start: 2017-04-11 | End: 2017-04-11

## 2017-04-11 RX ORDER — FUROSEMIDE 10 MG/ML
40 INJECTION INTRAMUSCULAR; INTRAVENOUS
Status: DISCONTINUED | OUTPATIENT
Start: 2017-04-11 | End: 2017-04-13

## 2017-04-11 RX ORDER — POTASSIUM CHLORIDE 20 MEQ/1
10 TABLET, EXTENDED RELEASE ORAL DAILY
Status: DISCONTINUED | OUTPATIENT
Start: 2017-04-11 | End: 2017-04-11

## 2017-04-11 RX ORDER — FUROSEMIDE 10 MG/ML
20 INJECTION INTRAMUSCULAR; INTRAVENOUS ONCE
Status: COMPLETED | OUTPATIENT
Start: 2017-04-11 | End: 2017-04-11

## 2017-04-11 RX ORDER — LISINOPRIL 10 MG/1
10 TABLET ORAL
Status: DISCONTINUED | OUTPATIENT
Start: 2017-04-11 | End: 2017-04-12

## 2017-04-11 RX ORDER — WARFARIN SODIUM 7.5 MG/1
7.5 TABLET ORAL
Status: DISCONTINUED | OUTPATIENT
Start: 2017-04-11 | End: 2017-04-14 | Stop reason: HOSPADM

## 2017-04-11 RX ORDER — CARVEDILOL 3.12 MG/1
3.12 TABLET ORAL 2 TIMES DAILY WITH MEALS
Status: DISCONTINUED | OUTPATIENT
Start: 2017-04-11 | End: 2017-04-14 | Stop reason: HOSPADM

## 2017-04-11 RX ORDER — KETOROLAC TROMETHAMINE 30 MG/ML
30 INJECTION, SOLUTION INTRAMUSCULAR; INTRAVENOUS EVERY 6 HOURS PRN
Status: DISCONTINUED | OUTPATIENT
Start: 2017-04-11 | End: 2017-04-14 | Stop reason: HOSPADM

## 2017-04-11 RX ORDER — POTASSIUM CHLORIDE 20 MEQ/1
20 TABLET, EXTENDED RELEASE ORAL DAILY
Status: DISCONTINUED | OUTPATIENT
Start: 2017-04-11 | End: 2017-04-13

## 2017-04-11 RX ADMIN — INSULIN HUMAN 10 UNITS: 100 INJECTION, SUSPENSION SUBCUTANEOUS at 23:00

## 2017-04-11 RX ADMIN — ONDANSETRON 4 MG: 2 INJECTION, SOLUTION INTRAMUSCULAR; INTRAVENOUS at 08:41

## 2017-04-11 RX ADMIN — CLEVIDIPINE 10 MG/HR: 0.5 EMULSION INTRAVENOUS at 06:26

## 2017-04-11 RX ADMIN — FUROSEMIDE 20 MG: 10 INJECTION, SOLUTION INTRAVENOUS at 06:40

## 2017-04-11 RX ADMIN — POTASSIUM CHLORIDE 20 MEQ: 1500 TABLET, EXTENDED RELEASE ORAL at 09:14

## 2017-04-11 RX ADMIN — ALUMINUM HYDROXIDE, MAGNESIUM HYDROXIDE,SIMETHICONE 30 ML: 400; 400; 40 LIQUID ORAL at 08:41

## 2017-04-11 RX ADMIN — TRAMADOL HYDROCHLORIDE 50 MG: 50 TABLET, FILM COATED ORAL at 03:51

## 2017-04-11 RX ADMIN — POTASSIUM CHLORIDE 10 MEQ: 7.46 INJECTION, SOLUTION INTRAVENOUS at 12:39

## 2017-04-11 RX ADMIN — OXYCODONE HYDROCHLORIDE 10 MG: 10 TABLET ORAL at 09:13

## 2017-04-11 RX ADMIN — OXYCODONE HYDROCHLORIDE 10 MG: 10 TABLET ORAL at 22:58

## 2017-04-11 RX ADMIN — OXYCODONE HYDROCHLORIDE 10 MG: 10 TABLET ORAL at 16:42

## 2017-04-11 RX ADMIN — OXYCODONE HYDROCHLORIDE 10 MG: 10 TABLET ORAL at 19:48

## 2017-04-11 RX ADMIN — FUROSEMIDE 40 MG: 10 INJECTION, SOLUTION INTRAMUSCULAR; INTRAVENOUS at 09:14

## 2017-04-11 RX ADMIN — OXYCODONE HYDROCHLORIDE 10 MG: 10 TABLET ORAL at 00:25

## 2017-04-11 RX ADMIN — OXYCODONE HYDROCHLORIDE 10 MG: 10 TABLET ORAL at 12:39

## 2017-04-11 RX ADMIN — INSULIN LISPRO 7 UNITS: 100 INJECTION, SOLUTION INTRAVENOUS; SUBCUTANEOUS at 19:44

## 2017-04-11 RX ADMIN — PRAVASTATIN SODIUM 40 MG: 20 TABLET ORAL at 21:00

## 2017-04-11 RX ADMIN — STANDARDIZED SENNA CONCENTRATE AND DOCUSATE SODIUM 1 TABLET: 8.6; 5 TABLET, FILM COATED ORAL at 19:48

## 2017-04-11 RX ADMIN — MORPHINE SULFATE 4 MG: 4 INJECTION INTRAVENOUS at 01:10

## 2017-04-11 RX ADMIN — VANCOMYCIN HYDROCHLORIDE 1500 MG: 100 INJECTION, POWDER, LYOPHILIZED, FOR SOLUTION INTRAVENOUS at 01:26

## 2017-04-11 RX ADMIN — OXYCODONE HYDROCHLORIDE 10 MG: 10 TABLET ORAL at 05:56

## 2017-04-11 RX ADMIN — DOCUSATE SODIUM 100 MG: 100 CAPSULE ORAL at 09:14

## 2017-04-11 RX ADMIN — MAGNESIUM SULFATE IN DEXTROSE 1 G: 10 INJECTION, SOLUTION INTRAVENOUS at 16:45

## 2017-04-11 RX ADMIN — LEVOTHYROXINE SODIUM 25 MCG: 50 TABLET ORAL at 07:05

## 2017-04-11 RX ADMIN — KETOROLAC TROMETHAMINE 30 MG: 30 INJECTION, SOLUTION INTRAMUSCULAR; INTRAVENOUS at 11:06

## 2017-04-11 RX ADMIN — ASPIRIN 81 MG: 81 TABLET ORAL at 09:13

## 2017-04-11 RX ADMIN — WARFARIN SODIUM 7.5 MG: 7.5 TABLET ORAL at 18:24

## 2017-04-11 ASSESSMENT — PAIN SCALES - GENERAL
PAINLEVEL_OUTOF10: 4
PAINLEVEL_OUTOF10: 4
PAINLEVEL_OUTOF10: 8
PAINLEVEL_OUTOF10: 4
PAINLEVEL_OUTOF10: 5
PAINLEVEL_OUTOF10: 7
PAINLEVEL_OUTOF10: 5
PAINLEVEL_OUTOF10: 6
PAINLEVEL_OUTOF10: 4
PAINLEVEL_OUTOF10: 2
PAINLEVEL_OUTOF10: 6
PAINLEVEL_OUTOF10: 5
PAINLEVEL_OUTOF10: 5
PAINLEVEL_OUTOF10: 9
PAINLEVEL_OUTOF10: 4
PAINLEVEL_OUTOF10: 5
PAINLEVEL_OUTOF10: 4
PAINLEVEL_OUTOF10: 6
PAINLEVEL_OUTOF10: 4

## 2017-04-11 ASSESSMENT — ENCOUNTER SYMPTOMS
PSYCHIATRIC NEGATIVE: 1
CONSTITUTIONAL NEGATIVE: 1
CARDIOVASCULAR NEGATIVE: 1
NEUROLOGICAL NEGATIVE: 1
MUSCULOSKELETAL NEGATIVE: 1
EYES NEGATIVE: 1
GASTROINTESTINAL NEGATIVE: 1
RESPIRATORY NEGATIVE: 1

## 2017-04-11 NOTE — PROGRESS NOTES
Inpatient Anticoagulation Service Note    Date: 4/11/2017  Reason for Anticoagulation: Bioprosthetic Valve Replacement (AVR)        Hemoglobin Value: 12.6  Hematocrit Value: 36.7  Lab Platelet Value: 143  Target INR: 2.0 to 3.0    INR from last 7 days     Date/Time INR Value    04/11/17 0454 1.1    04/10/17 1635 (!)1.32    04/07/17 0918 0.95        Dose from last 7 days     Date/Time Dose (mg)    04/11/17 0454 7.5        Significant Interactions: Aspirin, NSAID  Bridge Therapy: No   Comments: H/H with a post operative drop.  Pharmacy will monitor.  Chest tubes removed today.  Initiate warfarin therapy with a 7.5 mg daily dose.    Plan:  INR with morning labs  Education Material Provided?: Yes (AET 4/11)  Pharmacist suggested discharge dosing: unable to determine at this time     Monet Morris

## 2017-04-11 NOTE — DISCHARGE INSTRUCTIONS
Discharge Instructions    Discharged to home by car with relative. Discharged via wheelchair, hospital escort: Yes.  Special equipment needed: Not Applicable    Be sure to schedule a follow-up appointment with your primary care doctor or any specialists as instructed.     Discharge Plan:   Smoking Cessation Offered: Patient Refused  Influenza Vaccine Indication: Patient Refuses (Post OHS, will follow up in 6 weeks)    I understand that a diet low in cholesterol, fat, and sodium is recommended for good health. Unless I have been given specific instructions below for another diet, I accept this instruction as my diet prescription.   Other diet: cardiac    Special Instructions: heart surgery and coumadin    Cardiac Surgery Discharge Instructions/[facility]    Activity:  1. NO driving for 4 weeks after surgery. You may ride as a passenger.  2. NO lifting of any item over 10 lbs (e.g. gallon of milk) for 6 weeks after surgery.  Do not raise both arms above head, only one at a time.  Do not push or pull with your arms.  3. Walk as much as possible! Walk a minimum of 4 times per day. Depending on your fatigue and comfort level, you may walk as much as you wish. There is no maximum.  4. Other physical activities (sex, housework, gardening, etc.) are OK after 4 weeks or after 8 weeks if you want to golf (start by putting, then advance to chipping, then to driving).  5. Continue using incentive spirometer for 2 weeks.  If you are going home on oxygen and you were not on oxygen prior to surgery, keep using until you are oxygen free.  6. Weigh daily and write it down starting  the next morning after you arrive home. Call your doctor for a weight gain of 2-4 lbs in 1-2 days.    Incision Care:  1. SHOWER EVERYDAY-no baths. Make sure to clean your incision(s) twice daily with plain, perfume and dye-free soap (if you shower in the morning, stand at the sink at bedtime and clean incision with soap and water). Then pat  incision(s) dry with clean towel. Avoid creams or lotions on the incision(s).    2. If there is any increase in redness or swelling, or separation of the incision line, or thick drainage* from any of the incisions, call [facility] ([phone]). * Clear, thin drainage is not abnormal especially from the leg incision and/or chest tube sites.                    3. Continue to wear your GURMEET Stockings for 4 weeks on the leg(s) with the incision(s) or swelling. You may take off the stocking(s) when in bed or when the leg(s) are elevated.    General Instructions:  1. If you are on the blood thinner Coumadin (warfarin), you will need your coumadin levels checked periodically.  2. You have been referred to Cardiac Rehab which is highly recommended for you after heart surgery. You can start Cardiac Rehab 30 days after surgery.  If you do not have an appointment at the time of discharge call [phone] to schedule an appointment.  3. Your Primary Care Doctor typically handles Home Oxygen. The Home Oxygen service that drops off your tanks should be checking your oxygen saturation levels. Oxygen may be stopped when you are > 90 % saturated on room air. Check with your Primary Care Doctor if you are unsure.    Prescription Refills/Questions:   Call your usual Pharmacy for ALL refills   1. Pain medication questions only: Call [facility] at [phone].  (Remember that refills may require additional physician approval and will need at least 24 hours’ notice. Please call for refills on Mondays through Fridays from 9 am to 4 pm).  2. For all other medications (except pain medication): Call your Cardiology Group or Primary Care Doctor (not [facility]) for refills or questions.    [facility] IS ALWAYS AVAILABLE TO ANSWER YOUR QUESTIONS. DO NOT HESITATE TO CALL!    · Is patient discharged on Warfarin / Coumadin?   Yes    You are on the blood thinner Coumadin (warfarin) and you will need your  "coumadin levels checked periodically. For any questions call the [facility] @ [phone]. The home health nurse will draw your blood and the coumadin clinic will call with any change to your dose.      IMPORTANT: HOW TO USE THIS INFORMATION:  This is a summary and does NOT have all possible information about this product. This information does not assure that this product is safe, effective, or appropriate for you. This information is not individual medical advice and does not substitute for the advice of your health care professional. Always ask your health care professional for complete information about this product and your specific health needs.      WARFARIN - ORAL (WARF-uh-rin)      COMMON BRAND NAME(S): Coumadin      WARNING:  Warfarin can cause very serious (possibly fatal) bleeding. This is more likely to occur when you first start taking this medication or if you take too much warfarin. To decrease your risk for bleeding, your doctor or other health care provider will monitor you closely and check your lab results (INR test) to make sure you are not taking too much warfarin. Keep all medical and laboratory appointments. Tell your doctor right away if you notice any signs of serious bleeding. See also Side Effects section.      USES:  This medication is used to treat blood clots (such as in deep vein thrombosis-DVT or pulmonary embolus-PE) and/or to prevent new clots from forming in your body. Preventing harmful blood clots helps to reduce the risk of a stroke or heart attack. Conditions that increase your risk of developing blood clots include a certain type of irregular heart rhythm (atrial fibrillation), heart valve replacement, recent heart attack, and certain surgeries (such as hip/knee replacement). Warfarin is commonly called a \"blood thinner,\" but the more correct term is \"anticoagulant.\" It helps to keep blood flowing smoothly in your body by decreasing the amount of certain substances " (clotting proteins) in your blood.      HOW TO USE:  Read the Medication Guide provided by your pharmacist before you start taking warfarin and each time you get a refill. If you have any questions, ask your doctor or pharmacist. Take this medication by mouth with or without food as directed by your doctor or other health care professional, usually once a day. It is very important to take it exactly as directed. Do not increase the dose, take it more frequently, or stop using it unless directed by your doctor. Dosage is based on your medical condition, laboratory tests (such as INR), and response to treatment. Your doctor or other health care provider will monitor you closely while you are taking this medication to determine the right dose for you. Use this medication regularly to get the most benefit from it. To help you remember, take it at the same time each day. It is important to eat a balanced, consistent diet while taking warfarin. Some foods can affect how warfarin works in your body and may affect your treatment and dose. Avoid sudden large increases or decreases in your intake of foods high in vitamin K (such as broccoli, cauliflower, cabbage, brussels sprouts, kale, spinach, and other green leafy vegetables, liver, green tea, certain vitamin supplements). If you are trying to lose weight, check with your doctor before you try to go on a diet. Cranberry products may also affect how your warfarin works. Limit the amount of cranberry juice (16 ounces/480 milliliters a day) or other cranberry products you may drink or eat.      SIDE EFFECTS:  Nausea, loss of appetite, or stomach/abdominal pain may occur. If any of these effects persist or worsen, tell your doctor or pharmacist promptly. Remember that your doctor has prescribed this medication because he or she has judged that the benefit to you is greater than the risk of side effects. Many people using this medication do not have serious side effects. This  medication can cause serious bleeding if it affects your blood clotting proteins too much (shown by unusually high INR lab results). Even if your doctor stops your medication, this risk of bleeding can continue for up to a week. Tell your doctor right away if you have any signs of serious bleeding, including: unusual pain/swelling/discomfort, unusual/easy bruising, prolonged bleeding from cuts or gums, persistent/frequent nosebleeds, unusually heavy/prolonged menstrual flow, pink/dark urine, coughing up blood, vomit that is bloody or looks like coffee grounds, severe headache, dizziness/fainting, unusual or persistent tiredness/weakness, bloody/black/tarry stools, chest pain, shortness of breath, difficulty swallowing. Tell your doctor right away if any of these unlikely but serious side effects occur: persistent nausea/vomiting, severe stomach/abdominal pain, yellowing eyes/skin. This drug rarely has caused very serious (possibly fatal) problems if its effects lead to small blood clots (usually at the beginning of treatment). This can lead to severe skin/tissue damage that may require surgery or amputation if left untreated. Patients with certain blood conditions (protein C or S deficiency) may be at greater risk. Get medical help right away if any of these rare but serious side effects occur: painful/red/purplish patches on the skin (such as on the toe, breast, abdomen), change in the amount of urine, vision changes, confusion, slurred speech, weakness on one side of the body. A very serious allergic reaction to this drug is rare. However, get medical help right away if you notice any symptoms of a serious allergic reaction, including: rash, itching/swelling (especially of the face/tongue/throat), severe dizziness, trouble breathing. This is not a complete list of possible side effects. If you notice other effects not listed above, contact your doctor or pharmacist. In the [place] - Call your doctor for medical advice  about side effects. You may report side effects to FDA at 6-372-GEN-3861. In [place] - Call your doctor for medical advice about side effects. You may report side effects to [facility] [place] at 1-608.738.1958.      PRECAUTIONS:  Before taking warfarin, tell your doctor or pharmacist if you are allergic to it; or if you have any other allergies. This product may contain inactive ingredients, which can cause allergic reactions or other problems. Talk to your pharmacist for more details. Before using this medication, tell your doctor or pharmacist your medical history, especially of: blood disorders (such as anemia, hemophilia), bleeding problems (such as bleeding of the stomach/intestines, bleeding in the brain), blood vessel disorders (such as aneurysms), recent major injury/surgery, liver disease, alcohol use, mental/mood disorders (including memory problems), frequent falls/injuries. It is important that all your doctors and dentists know that you take warfarin. Before having surgery or any medical/dental procedures, tell your doctor or dentist that you are taking this medication and about all the products you use (including prescription drugs, nonprescription drugs, and herbal products). Avoid getting injections into the muscles. If you must have an injection into a muscle (for example, a flu shot), it should be given in the arm. This way, it will be easier to check for bleeding and/or apply pressure bandages. This medication may cause stomach bleeding. Daily use of alcohol while using this medicine will increase your risk for stomach bleeding and may also affect how this medication works. Limit or avoid alcoholic beverages. If you have not been eating well, if you have an illness or infection that causes fever, vomiting, or diarrhea for more than 2 days, or if you start using any antibiotic medications, contact your doctor or pharmacist immediately because these conditions can affect how warfarin works. This  medication can cause heavy bleeding. To lower the chance of getting cut, bruised, or injured, use great caution with sharp objects like safety razors and nail cutters. Use an electric razor when shaving and a soft toothbrush when brushing your teeth. Avoid activities such as contact sports. If you fall or injure yourself, especially if you hit your head, call your doctor immediately. Your doctor may need to check you. The Food & Drug Administration has stated that generic warfarin products are interchangeable. However, consult your doctor or pharmacist before switching warfarin products. Be careful not to take more than one medication that contains warfarin unless specifically directed by the doctor or health care provider who is monitoring your warfarin treatment. Older adults may be at greater risk for bleeding while using this drug. This medication is not recommended for use during pregnancy because of serious (possibly fatal) harm to an unborn baby. Discuss the use of reliable forms of birth control with your doctor. If you become pregnant or think you may be pregnant, tell your doctor immediately. If you are planning pregnancy, discuss a plan for managing your condition with your doctor before you become pregnant. Your doctor may switch the type of medication you use during pregnancy. Very small amounts of this medication may pass into breast milk but is unlikely to harm a nursing infant. Consult your doctor before breast-feeding.      DRUG INTERACTIONS:  Drug interactions may change how your medications work or increase your risk for serious side effects. This document does not contain all possible drug interactions. Keep a list of all the products you use (including prescription/nonprescription drugs and herbal products) and share it with your doctor and pharmacist. Do not start, stop, or change the dosage of any medicines without your doctor's approval. Warfarin interacts with many prescription,  "nonprescription, vitamin, and herbal products. This includes medications that are applied to the skin or inside the vagina or rectum. The interactions with warfarin usually result in an increase or decrease in the \"blood-thinning\" (anticoagulant) effect. Your doctor or other health care professional should closely monitor you to prevent serious bleeding or clotting problems. While taking warfarin, it is very important to tell your doctor or pharmacist of any changes in medications, vitamins, or herbal products that you are taking. Some products that may interact with this drug include: capecitabine, imatinib, mifepristone. Aspirin, aspirin-like drugs (salicylates), and nonsteroidal anti-inflammatory drugs (NSAIDs such as ibuprofen, naproxen, celecoxib) may have effects similar to warfarin. These drugs may increase the risk of bleeding problems if taken during treatment with warfarin. Carefully check all prescription/nonprescription product labels (including drugs applied to the skin such as pain-relieving creams) since the products may contain NSAIDs or salicylates. Talk to your doctor about using a different medication (such as acetaminophen) to treat pain/fever. Low-dose aspirin and related drugs (such as clopidogrel, ticlopidine) should be continued if prescribed by your doctor for specific medical reasons such as heart attack or stroke prevention. Consult your doctor or pharmacist for more details. Many herbal products interact with warfarin. Tell your doctor before taking any herbal products, especially bromelains, coenzyme Q10, cranberry, danshen, dong quai, fenugreek, garlic, ginkgo biloba, ginseng, and Cristian's wort, among others. This medication may interfere with a certain laboratory test to measure theophylline levels, possibly causing false test results. Make sure laboratory personnel and all your doctors know you use this drug.      OVERDOSE:  If overdose is suspected, contact a poison control center " or emergency room immediately. [place] residents can call the [place] National Poison Hotline at 1-478.616.1025. [place] residents can call a provincial poison control center. Symptoms of overdose may include: bloody/black/tarry stools, pink/dark urine, unusual/prolonged bleeding.      NOTES:  Do not share this medication with others. Laboratory and/or medical tests (such as INR, complete blood count) must be performed periodically to monitor your progress or check for side effects. Consult your doctor for more details.      MISSED DOSE:  For the best possible benefit, do not miss any doses. If you do miss a dose and remember on the same day, take it as soon as you remember. If you remember on the next day, skip the missed dose and resume your usual dosing schedule. Do not double the dose to catch up because this could increase your risk for bleeding. Keep a record of missed doses to give to your doctor or pharmacist. Contact your doctor or pharmacist if you miss 2 or more doses in a row.      STORAGE:  Store at room temperature away from light and moisture. Do not store in the bathroom. Keep all medications away from children and pets. Do not flush medications down the toilet or pour them into a drain unless instructed to do so. Properly discard this product when it is  or no longer needed. Consult your pharmacist or local waste disposal company for more details about how to safely discard your product.      MEDICAL ALERT:  Your condition and medication can cause complications in a medical emergency. For information about enrolling in MedicAlert, call 1-910.467.1917 ([place]) or 1-130.264.3532 ([place]).      Information last revised 2010 Copyright(c) 2010 [employer]      · Is patient Post Blood Transfusion?  No  Depression / Suicide Risk    As you are discharged from this [facility] facility, it is important to learn how to keep safe from harming yourself.    Recognize the warning signs:  · Abrupt  changes in personality, positive or negative- including increase in energy   · Giving away possessions  · Change in eating patterns- significant weight changes-  positive or negative  · Change in sleeping patterns- unable to sleep or sleeping all the time   · Unwillingness or inability to communicate  · Depression  · Unusual sadness, discouragement and loneliness  · Talk of wanting to die  · Neglect of personal appearance   · Rebelliousness- reckless behavior  · Withdrawal from people/activities they love  · Confusion- inability to concentrate     If you or a loved one observes any of these behaviors or has concerns about self-harm, here's what you can do:  · Talk about it- your feelings and reasons for harming yourself  · Remove any means that you might use to hurt yourself (examples: pills, rope, extension cords, firearm)  · Get professional help from the community (Mental Health, Substance Abuse, psychological counseling)  · Do not be alone:Call your Safe Contact- someone whom you trust who will be there for you.  · Call your local CRISIS HOTLINE 597-8004 or 921-185-8722  · Call your local Children's Mobile Crisis Response Team [place] (413) 957-8244 or www.extraTKT  · Call the toll free National Suicide Prevention Hotlines   · National Suicide Prevention Lifeline 706-724-JTPF (7395)  · National Hope Line Network 800-SUICIDE (634-5954)

## 2017-04-11 NOTE — DIETARY
Nutrition Services: Consult cardiac rehab diet education; poor PO intake PTA per nutritional admit screen    Pt is a 57 y.o. Male with Dx: AVR    PMH: Severe aortic stenosis (calcific or degenerative), bicuspid aortic valve, hypertension, obesity, hypothyroidism, dyslipidemia.  BMI= 35.93  Labs: HA1c 6.0%, triglycerides 198, HDL 35    S/p Aortic valve replacement (29 mm Forbes Perimount Magna pericardial valve), and intraoperative transesophageal echocardiography 4/10. Pt asleep this afternoon, so healthy diet handouts left outside of room. Pt tolerating clear liquid diet, will advance to full liquid @ dinner. RD will monitor pt's PO intake for adequacy. Nutrition Representative will see pt daily for menu options as diet advances. Snacks and supplements available.    RD following for diet education and adequate PO intake.

## 2017-04-11 NOTE — CONSULTS
DATE OF SERVICE:  04/10/2017    REQUESTING PHYSICIAN:  Aditya Carter MD    REASON FOR REQUEST:  Ventilator management.    HISTORY OF PRESENT ILLNESS:  This is a 57-year-old gentleman with known past   medical history of severe aortic stenosis, hypercholesterolemia, and   hypothyroidism that came in for elective aortic valve replacement.  Patient is   postop at this present time, currently on ventilator and Cleviprex at 2 mg   per hour, Precedex 0.25, insulin at 1 and is slowly waking and following   commands.  Reportedly, uneventful procedure.    ALLERGIES:  No known drug allergies.    HOME MEDICATIONS:  Include Synthroid, Pravachol, and aspirin.    PAST MEDICAL HISTORY:  Inclusive of aortic stenosis, dyslipidemia, and   hypothyroidism.    FAMILY HISTORY:  Without any significant history of coronary artery disease.    SOCIAL HISTORY:  Patient is a nonsmoker.    REVIEW OF SYSTEMS:  Unobtainable given patient's clinical state.    PHYSICAL EXAMINATION:  VITAL SIGNS:  Temperature is 36.3, blood pressure 160/88, satting 94% on 40%   FiO2, with a pulse rate of 53.  GENERAL:  No acute distress.  HEENT:  Normocephalic, atraumatic.  Anicteric.  CARDIOVASCULAR:  Gera rate, regular rhythm.  RESPIRATORY:  Scattered rhonchi, mildly diminished, otherwise clear.  He has a   sternal incision that is dressed.  ABDOMEN:  Soft, dressed incision site, nondistended.  EXTREMITIES:  Trace bilateral lower extremity edema.  NEUROLOGIC:  Heavily sedate.  PSYCHIATRIC:  Unable to fully assess given clinical state.    RESULTS:  Labs from April 7th showed normal CBC and CMP.  Coags from today   show INR of 1.32 and PTT of 27.3.  Most recent ABG with a pH of 7.30, pCO2 of   42, pO2 of 90, and 96% saturation on 100% FiO2.  Last cortisol in March 18th   was 7.2.    IMAGING:  Chest x-ray shows bilateral atelectasis, enlarged cardiac   silhouette, ET tube and central line placement.  He has esophageal echo from   April 10th, not dictated just  yet.    ASSESSMENT:  1.  Severe aortic stenosis, status post elective valve replacement on   04/10/2017.  2.  Postoperative mechanical ventilatory support.  3.  History of hypothyroidism, on replacement therapy.  4.  Dyslipidemia, on medication.    PLAN:  Again, this is a 57-year-old gentleman who had aortic valve replacement   surgery done today who returns to the intensive care unit, intubated on full   mechanical ventilatory support, currently on minimal pressure support, he is   awakening at this time.  We will continue to wean with anticipation of having   patient extubated within the 6-hour window.  We will continue respiratory   therapy and O2 protocols.  Monitor volume status.  Continue continuous   hemodynamic monitoring, also monitoring for any active blood loss or   displacement or any interval development of coagulopathy.    Total critical care time not including billable procedures 35 minutes.       ____________________________________     MD AMANDA Guillermo / STARR    DD:  04/10/2017 17:26:30  DT:  04/10/2017 20:48:26    D#:  [id number]  Job#:  [id number]

## 2017-04-11 NOTE — PROGRESS NOTES
Assumed care of patient at 1330. Received bedside report from EL Barbosa. Patient ambulated and placed back in chair. EL Barbosa, heart nurse, discontinued chest tubes. Patient comfortable in chair with call light and personal belongings with in reach. Family at bedside. Pharmacy providing education. No concerns or questions at this time.  Will continue to closely monitor.

## 2017-04-11 NOTE — PROGRESS NOTES
MD paged regarding increased Cleviprex and patient complaint of numbness and tingling in left fingers (site of ART line) Per RASHEED Rainey utilize cuff pressures for titrations and remove ART line, and increase Cleviprex max to 21 mg/hr.

## 2017-04-11 NOTE — PROGRESS NOTES
Cardiovascular Surgery Progress Note    Name: Gagandeep Durbin Jr.  MRN: [id number]  : 1960  Admit Date: 4/10/2017  9:26 AM  Procedure:  Procedure(s) and Anesthesia Type:     * AORTIC VALVE REPLACEMENT - General     * KIARRA - General  1 Day Post-Op    Vitals:  Patient Vitals for the past 8 hrs:   Temp Monitored Temp SpO2 O2 (LPM) Pulse Heart Rate (Monitored) Resp NIBP Weight   17 0759 - - 95 % 10 (!) 59 (!) 59 (!) 21 - -   17 0630 - 37 °C (98.6 °F) (!) 77 % - (!) 46 (!) 58 14 117/68 mmHg -   17 0600 - 37 °C (98.6 °F) (!) 81 % - (!) 58 (!) 59 20 107/62 mmHg -   17 0530 - 36.9 °C (98.4 °F) (!) 81 % - 74 61 20 - -   17 0500 - 36.8 °C (98.2 °F) 89 % - 62 62 16 121/68 mmHg 123.5 kg (272 lb 4.3 oz)   17 0430 - 36.7 °C (98.1 °F) 91 % - (!) 58 60 (!) 21 - -   17 0400 36.8 °C (98.2 °F) 36.8 °C (98.2 °F) 90 % - 63 61 (!) 23 115/67 mmHg -   17 0330 - 36.8 °C (98.2 °F) 91 % - 63 61 18 - -   17 0300 - 36.8 °C (98.2 °F) 92 % - (!) 58 (!) 58 14 (!) 93/70 mmHg -   17 0230 - 36.8 °C (98.2 °F) 92 % - (!) 58 (!) 58 14 (!) 97/58 mmHg -   17 0200 - 36.7 °C (98.1 °F) 93 % - (!) 54 (!) 55 16 - -   17 0130 - 36.7 °C (98.1 °F) 91 % - (!) 49 (!) 49 15 - -   17 0110 - - - - - (!) 49 19 - -   17 0100 - 36.5 °C (97.7 °F) 93 % - (!) 52 (!) 59 20 - 123.5 kg (272 lb 4.3 oz)     Temp (24hrs), Av.4 °C (97.6 °F), Min:36.1 °C (97 °F), Max:36.8 °C (98.2 °F)      Respiratory:  Baker Vent Mode: Spont, PEEP/CPAP: 8, FiO2: 40, P Peak (PIP): 17, P MEAN: 11 Respiration: (!) 21, Pulse Oximetry: 95 %, O2 Daily Delivery Respiratory : OxyMask  Chest Tube Group 1 (A) Mediastinal Straight 32-Tube Status / Drainage: Patent;Draining;Sutured in Place;Moderate;Serosanguinous, Chest Tube Group 2 (B) Mediastinal Angled 32-Tube Status / Drainage: Patent;Draining;Sutured in Place;Moderate;Serosanguinous, Chest Tube Group 1 (A) Mediastinal Straight 32-Device: Closed Drainage  System;Suction 20 cm Water, Chest Tube Group 2 (B) Mediastinal Angled 32-Device: Closed Drainage System;Suction 20 cm Water  Chest Tube Drains:     Mediastinal Chest Tube 1: 25 ml    Fluids:    Intake/Output Summary (Last 24 hours) at 04/11/17 0831  Last data filed at 04/11/17 0600   Gross per 24 hour   Intake 2269.34 ml   Output   2333 ml   Net -63.66 ml     Admit weight: Weight: 122.471 kg (270 lb)  Current weight: Weight: 123.5 kg (272 lb 4.3 oz) (04/11/17 0500)    Labs:  Recent Labs      04/10/17   1635 04/11/17 0454   WBC   --   17.6*   RBC   --   3.96*   HEMOGLOBIN   --   12.6*   HEMATOCRIT   --   36.7*   MCV   --   92.7   MCH   --   31.8   MCHC   --   34.3   RDW   --   43.8   PLATELETCT  148*  143*   MPV   --   10.5         Recent Labs      04/11/17   0020  04/11/17 0454   SODIUM   --   132*   POTASSIUM  4.7  4.4   CHLORIDE   --   104   CO2   --   23   GLUCOSE   --   120*   BUN   --   19   CREATININE   --   0.76   CALCIUM   --   7.6*     Recent Labs      04/10/17   1635  04/11/17 0454   APTT  27.3   --    INR  1.32*  1.10       Medications:  • lisinopril  10 mg     • carvedilol  3.125 mg     • furosemide  40 mg     • potassium chloride SA  20 mEq     • pravastatin  40 mg     • levothyroxine  25 mcg     • K+ Scale: Goal of 4.5  1 Each     • docusate sodium  100 mg      And   • senna-docusate  1 Tab     • magnesium sulfate  1 g Stopped (04/10/17 1850)   • aspirin EC  81 mg     • MD ALERT... warfarin       • insulin regular  0-14 Units         Exam:   Review of Systems   Constitutional: Negative.    HENT: Negative.    Eyes: Negative.    Respiratory: Negative.    Cardiovascular: Negative.    Gastrointestinal: Negative.    Genitourinary: Negative.    Musculoskeletal: Negative.    Skin: Negative.    Neurological: Negative.    Endo/Heme/Allergies: Negative.    Psychiatric/Behavioral: Negative.        Physical Exam   Constitutional: He is oriented to person, place, and time. He appears well-developed and  well-nourished. No distress.   Neck: Neck supple.   Cardiovascular: Normal rate, regular rhythm and normal heart sounds.  Exam reveals no gallop and no friction rub.    No murmur heard.  Pulmonary/Chest: Effort normal. No respiratory distress. He has decreased breath sounds in the right lower field and the left lower field. He has no wheezes. He has no rales.   Abdominal: Soft. He exhibits no distension. There is no tenderness.   Neurological: He is alert and oriented to person, place, and time.   Skin: Skin is warm and dry. He is not diaphoretic.       Quality Measures:     Orozco catheter: One or Two Days Post Surgery (Day of Surgery being Day 0)  Central line in place: Need for access    DVT Prophylaxis: Warfarin (Coumadin)  DVT prophylaxis - mechanical: Not indicated at this time, ambulatory  Ulcer prophylaxis: Yes    Assessed for rehab: Patient returned to prior level of function, rehabilitation not indicated at this time      Assessment/Plan:  POD 1 - HTN- on cleviprex gtt- restart po meds, diurese, dc swan/mediastinal tubes/orozco, add toradol for pain, amb, enc IS    Patient seen, examined and plan reviewed with midlevel provider. I agree with the plan.      Active Hospital Problems    Diagnosis   • Aortic stenosis [I35.0]     Priority: Medium     ECHO -Aortic Valve  Aortic annular calcification. Highly calcified aortic valve leaflets.   Mild aortic insufficiency. Mild aortic stenosis. Transvalvular   gradients are - Peak: 33 mmHg Mean: 20 mmHg. Dimensionless index is   20/64=0.31.

## 2017-04-11 NOTE — PROGRESS NOTES
Patient oxygen needs increased from 2L n/c to 8L via mask. Dr. Carey at bedside to assess patient . Per MD: give 20 mg Lasix.

## 2017-04-11 NOTE — PROGRESS NOTES
Report received from EL Barbosa.   All gtt's and lines verified, recent ABG discussed.  Patient resting, nods appropriately, plan of care discussed with patient and family at bedside. Patient nods in agreement.

## 2017-04-11 NOTE — CARE PLAN
Problem: Post Op Day 1 CABG/Heart Valve Replacement  Goal: Optimal care of the post op CABG/heart valve replacement Post Op Day 1  Intervention: EKG and CXR completed  Done.  Intervention: All valve patients: PT/INR daily  Done.  Intervention: Antibiotics are discontinued within 24 hours of anesthesia end time unless indication documented for continuation beyond 24 hours  Done.  Intervention: Daily Weights  Done.  Intervention: Up in chair for all meals  Done.  Intervention: Ambulate in am if stable. First ambulation is 25 feet. Repeat x 3 as tolerated. Ambulate again before bed.  Done.  Intervention: Discontinue orozco catheter unless documented reason for continuation  Not completed  Intervention: Remove original surgical dressing after 24 hrs, leave open to air unless otherwise specified by physician  To be done 1600 today  Intervention: Cardiac rehab phase 1 ordered and smoking cessation education and program referral as appropriate  Done.  Intervention: Consider chest tube removal by MD  Done.  Intervention: IS q 1 hour while awake and record best IS volume  Done 2100  Intervention: Graduated elastic stockings  Done  Intervention: Saline lock IV  Done.  Intervention: Transfer to [facility], begin VS q 4 hours  Done.  Intervention: After 24th hour post-anesthesia end time, transition patient to Cardiac Surgery SQ Insulin Protocol  Ordered  Intervention: If patient is CABG or on home beta-blocker, start Beta-Blocker on POD 1 or POD 2 or contraindication documented  Done.

## 2017-04-11 NOTE — CARE PLAN
Problem: Day of surgery post CABG/Heart valve replacement  Goal: Stabilization in immediate post op period  Intervention: If radial artery used, elevate arm, no BP checks or needle sticks from affected arm, monitor ulnar pulse and capillary refill  Done.  Intervention: First post op hour labs and diagnostics per MD order  Done.  Intervention: Serum K q 6 hours x 24 hours. ABG and CBC prn.  Done.  Intervention: For FSBS greater than 130, start Post Cardiac Surgery Insulin Drip Protocol  Done.  Intervention: FSBS frequency as per Cardiac Surgery Insulin Drip Protocol  Done.  Intervention: For patients on Beta Blockers: verify dose given prior to surgery or within 6 hours after arrival to the unit  N/A  Intervention: Chest tube to 20 cm suction, record CT drainage with VS  Done.  Intervention: For CT drainage > 300 cc in first post op hour and/or 150 cc in subsequent hours: platelets, coag screen, fibrinogen, H&H per order  Done.  Intervention: Titrate and wean off vasoactive drips per patient’s condition and per MD order while maintaining SBP  mmHg per MD order  Done.  Intervention: VAP protocol in place  Done.  Intervention: Wean from vent per protocol (see protocol), extubation goal with 2-6 hours post op.  Done.  Intervention: IS q 1 hour while awake post extubation  Done.  Intervention: Bedrest until extubated and groin lines out  NOC  Intervention: Out of bed, dangle 4 hours post extubation  NOC  Intervention: Up in chair 4 hours, day of extubation  tomorrow  Intervention: Maintain all original surgical dressings for 24 hours  Done.  Intervention: Clear liquids post extubation, advance as tolerated  NOC  Intervention: Discontinue [place] stas and arterial line 12-18 hours post op if hemodynamically stable and off vasoactive drips  NOC

## 2017-04-11 NOTE — PROGRESS NOTES
May transition to tele status on 6 L NC per [facility]. Also received order to hold daily lisinopril and to give later if SBP >130.

## 2017-04-11 NOTE — PROGRESS NOTES
Pulmonary Critical Care Progress Note        Chief Complaint: post op vent management    History of Present Illness: 57 y.o. male admitted for AVR    ROS:  Respiratory: negative shortness of breath and negative sputum production, Cardiac: negative chest pain and negative palpitations, GI: negative nausea and negative vomiting.  All other systems negative.    Interval Events:  24 hour interval history reviewed   - s/p avr 4/10   - extubated overnight   - on oxy mask at present   - remains on cleviprex    PFSH:  No change.    Respiratory:     Pulse Oximetry: 89 %  Chest Tube Drains:     Mediastinal Chest Tube 1: 40 ml    Exam: unlabored respirations, no intercostal retractions or accessory muscle use and diminished breath sounds moderate  ImagingAvailable data reviewed   Recent Labs      04/10/17   1819  04/10/17   1922  04/10/17   2011   ISTATAPH  7.332*  7.364*  7.354*   ISTATAPCO2  44.2*  36.3  36.9   ISTATAPO2  211*  76  87   ISTATATCO2  25  22  22   EHGMJIG1GLH  100*  95  96   ISTATARTHCO3  23.4  20.7  20.5   ISTATARTBE  -3  -4  -4   ISTATTEMP  36.4 C  36.5 C  36.7 C   ISTATFIO2  100  50  40   ISTATSPEC  Arterial  Arterial  Arterial   ISTATAPHTC  7.341*  7.371*  7.358*   [id number]  208*  74  86       HemoDynamics:  Pulse: 62, Heart Rate (Monitored): 62  Blood Pressure: (!) 163/85 mmHg, Arterial BP: 147/70 mmHg, NIBP: 121/68 mmHg  CVP (mm Hg): (!) 13 MM HG    Exam: regular rate and rhythm  Imaging: Available data reviewed        Neuro:  GCS Total [place] Coma Score: 15       Exam: no focal deficits noted  Imaging: Available data reviewed    Fluids:  Intake/Output       04/09/17 0700 - 04/10/17 0659 (Not Admitted) 04/10/17 0700 - 04/11/17 0659 04/11/17 0700 - 04/12/17 0659      2395-4258 3378-9927 Total 7073-2588 1866-3995 Total 2902-2148 6775-9244 Total       Intake    P.O.  --  -- --  --  540 540  --  -- --    P.O. -- -- -- -- 540 540 -- -- --    I.V.  --  -- --  35.1  1260.5 1295.6  --  -- --    Clevidipine  Volume -- -- -- 1.9 0 1.9 -- -- --    Precedex Volume -- -- -- 23.1 19.3 42.4 -- -- --    Insulin Volume -- -- -- 7.9 102.5 110.4 -- -- --    Epinephrine Volume -- -- -- 2.3 28.7 31 -- -- --    IV Volume (PlasmaLyte) -- -- -- -- 500 500 -- -- --    IV Volume (NS TKO) -- -- -- -- 60 60 -- -- --    IV Piggyback Volume (IV Piggyback) -- -- -- -- 550 550 -- -- --    Blood  --  -- --  340  -- 340  --  -- --    Cell Saver Volume (mL) -- -- -- 340 -- 340 -- -- --    Total Intake -- -- -- 375.1 1800.5 2175.6 -- -- --       Output    Urine  --  -- --  1200  638 1838  --  -- --    Indwelling Cathether -- -- --  -- -- --    Void (ml) -- -- -- 600 -- 600 -- -- --    Drains  --  -- --  20  220 240  --  -- --    Mediastinal Chest Tube 1 -- -- -- 20 220 240 -- -- --    Total Output -- -- -- 0932 373 8140 -- -- --       Net I/O     -- -- -- -844.9 942.5 97.6 -- -- --        Weight: 123.5 kg (272 lb 4.3 oz)  Recent Labs      04/10/17   1635  17   0020  174   SODIUM   --    --   132*   POTASSIUM   --   4.7  4.4   CHLORIDE   --    --   104   CO2   --    --   23   BUN   --    --   19   CREATININE   --    --   0.76   MAGNESIUM  2.2   --    --    CALCIUM   --    --   7.6*       GI/Nutrition:  Exam: abdomen is soft and non-tender  Imaging: Available data reviewed  taking PO  Liver Function  Recent Labs      17   GLUCOSE  120*       Heme:  Recent Labs      04/10/17   1635  17   0454   RBC   --   3.96*   HEMOGLOBIN   --   12.6*   HEMATOCRIT   --   36.7*   PLATELETCT  148*  143*   PROTHROMBTM  16.8*  14.6   APTT  27.3   --    INR  1.32*  1.10       Infectious Disease:  Temp  Av.4 °C (97.6 °F)  Min: 36.1 °C (97 °F)  Max: 36.8 °C (98.2 °F)  Monitored Temp  Av.6 °C (97.8 °F)  Min: 36.1 °C (97 °F)  Max: 36.8 °C (98.2 °F)  Micro: cultures reviewed  Recent Labs      17   0454   WBC  17.6*     Current Facility-Administered Medications   Medication Dose Frequency Provider Last Rate  Last Dose   • clevidipine (CLEVIPREX) IV emulsion  0-21 mg/hr Continuous Brigid Loaiza, A.P.N. 20 mL/hr at 04/11/17 0705 10 mg/hr at 04/11/17 0705   • pravastatin (PRAVACHOL) tablet 40 mg  40 mg Q EVENING Brigid Loaiza A.P.N.   40 mg at 04/10/17 2118   • levothyroxine (SYNTHROID) tablet 25 mcg  25 mcg AM ES Brigid Loaiza, A.P.N.   25 mcg at 04/11/17 0705   • carvedilol (COREG) tablet 3.125 mg  3.125 mg BID WITH MEALS Brigid Loaiza, A.P.N.       • Respiratory Care per Protocol   Continuous RT Brigid Loaiza A.P.N.       • NS infusion   Continuous Brigid Loaiza A.P.N. 10 mL/hr at 04/10/17 1732     • K+ Scale: Goal of 4.5  1 Each Q6HRS Brigid Loaiza A.P.N.   Stopped at 04/11/17 0000   • Pharmacy Consult Request ...Pain Management Review 1 Each  1 Each PRN Brigid Loaiza A.P.N.       • docusate sodium (COLACE) capsule 100 mg  100 mg QAM Brigid Loaiza A.P.N.   Stopped at 04/10/17 1645    And   • senna-docusate (PERICOLACE or SENOKOT S) 8.6-50 MG per tablet 1 Tab  1 Tab Nightly Brigid Loaiza A.P.N.   1 Tab at 04/10/17 2117    And   • senna-docusate (PERICOLACE or SENOKOT S) 8.6-50 MG per tablet 1 Tab  1 Tab Q24HRS PRN Brigid Loaiza, A.P.N.        And   • lactulose 20 GM/30ML solution 30 mL  30 mL Q24HRS PRN Brigid Loaiza A.P.N.        And   • bisacodyl (DULCOLAX) suppository 10 mg  10 mg Q24HRS PRN Brigid Loaiza, A.P.N.        And   • fleet enema 133 mL  1 Each Once PRN Brigid Loaiza, A.P.N.       • magnesium sulfate ivpb premix 1 g  1 g QDAY Brigid Loaiza A.P.N.   Stopped at 04/10/17 1850   • aspirin EC (ECOTRIN) tablet 81 mg  81 mg DAILY GURJIT Ramos.P.N.       • MD ALERT... warfarin (COUMADIN) per pharmacy protocol   pharmacy to dose GURJIT Ramos.P.N.       • electrolyte-A (PLASMALYTE-A) infusion   PRN GURJIT Ramos.P.N.   1,000 mL at 04/10/17 2335   • nitroglycerin 50 mg in D5W 250 ml infusion  0-100 mcg/min Continuous GURJIT Ramos.P.N.   Stopped at 04/10/17 1645   • esmolol  (BREVIBLOC) 2.5 g/250 mL NS infusion (PREMIX)  0-300 mcg/kg/min Continuous Brigid Loaiza, A.P.N.   Stopped at 04/10/17 1600   • oxycodone immediate-release (ROXICODONE) tablet 5 mg  5 mg Q3HRS PRN Brigid Loaiza, A.P.N.   5 mg at 04/10/17 2118   • oxycodone immediate release (ROXICODONE) tablet 10 mg  10 mg Q3HRS PRN Brigid Loaiza, A.P.N.   10 mg at 04/11/17 0556   • tramadol (ULTRAM) 50 MG tablet 50 mg  50 mg Q4HRS PRN Brigid Loaiza, A.P.N.   50 mg at 04/11/17 0351   • midazolam (VERSED) 2 MG/2ML injection 0.5-2 mg  0.5-2 mg Q HOUR PRN Brigid Loaiza, A.P.N.       • dexmedetomidine (PRECEDEX) 200 mcg in NS 50 mL infusion  0-1.5 mcg/kg/hr Continuous Brigid Loaiza, A.P.N.   Stopped at 04/10/17 2053   • sodium bicarbonate 8.4 % injection 50 mEq  50 mEq Q HOUR PRN Brigid Loaiza, A.P.N.   50 mEq at 04/10/17 1652   • morphine (pf) 4 mg/ml injection 4 mg  4 mg Q HOUR PRN Brigid Loaiza, A.P.N.   4 mg at 04/11/17 0110   • ondansetron (ZOFRAN) syringe/vial injection 4 mg  4 mg Q6HRS PRN Brigid Loaiza, A.P.N.        Or   • prochlorperazine (COMPAZINE) injection 10 mg  10 mg Q6HRS PRN Brigid Loaiza, A.P.N.        Or   • promethazine (PHENERGAN) suppository 25 mg  25 mg Q6HRS PRN Brigid Loaiza, A.P.N.       • acetaminophen (TYLENOL) tablet 650 mg  650 mg Q4HRS PRN Brigid Loaiza, A.P.N.        Or   • acetaminophen (TYLENOL) suppository 650 mg  650 mg Q4HRS PRN Brigid Loaiza, A.P.N.       • mag hydrox-al hydrox-simeth (MAALOX PLUS ES or MYLANTA DS) suspension 30 mL  30 mL Q4HRS PRN Brigid Loaiza, A.P.N.       • diphenhydrAMINE (BENADRYL) tablet/capsule 25 mg  25 mg HS PRN - MR X 1 Brigid Loaiza, A.P.N.       • epinephrine 1 mg/mL(1:1000) 4 mg in  mL Infusion  0-0.2 mcg/kg/min Continuous Aditya Carter M.D.   Stopped at 04/10/17 8041   • insulin regular human (HUMULIN/NOVOLIN R) 62.5 Units in  mL infusion per protocol  1-6 Units/hr Continuous Aditya Carter M.D. 12 mL/hr at 04/11/17 0708 3 Units/hr at  04/11/17 0708    And   • insulin regular (HUMULIN R) injection 0-14 Units  0-14 Units Once Aditya Carter M.D.   0 Units at 04/10/17 1700    And   • insulin regular (HUMULIN R) injection 0-10 Units  0-10 Units PRN Aditya Carter M.D.   1 Units at 04/10/17 2316    And   • dextrose 50% (D50W) injection 25 mL  25 mL PRN Aditya Carter M.D.       • insulin lispro (HUMALOG) injection 0-20 Units  0-20 Units PRVENUS Carter M.D.         Last reviewed on 4/10/2017 10:02 AM by Georgia Mathew R.N.    Quality  Measures:  Labs reviewed, Medications reviewed and Radiology images reviewed                      Assessment/Plan  -  Severe aortic stenosis, status post elective valve replacement on    04/10/2017.  -  Postoperative mechanical ventilatory support.   - intubated 4/10-10   - 02 requirements increased this am   - dose of lasix    - is/pep/mobilization  -  History of hypothyroidism, on replacement therapy.  -  Dyslipidemia, on medication.    Discussed patient condition and risk of morbidity and/or mortality with RN, RT, Therapies and Pharmacy.    The patient remains critically ill.

## 2017-04-11 NOTE — OP REPORT
DATE OF SERVICE:  04/10/2017    REFERRING PHYSICIAN:  Tunde Diggs MD    PREOPERATIVE DIAGNOSES:  Severe aortic stenosis (calcific or degenerative),   bicuspid aortic valve, hypertension, obesity, hypothyroidism, dyslipidemia.    POSTOPERATIVE DIAGNOSES:  Severe aortic stenosis (calcific or degenerative),   bicuspid aortic valve, hypertension, obesity, hypothyroidism, dyslipidemia.    PROCEDURE PERFORMED:  Aortic valve replacement (29 mm Forbes Perimount Magna   pericardial valve), and intraoperative transesophageal echocardiography.    SURGEON:  Aditya Carter MD.    FIRST ASSISTANT:  Ko Coffey MD    SECOND ASSISTANT:  SHER San    ANESTHESIOLOGIST:  Davy Lackey MD    ANESTHESIA:  General endotracheal.    DRAINS:  Mediastinal chest tubes x2 (32-Greek straight and angled).    MISCELLANEOUS:  Temporary epicardial ventricular pacemaker wires.    COMPLICATIONS:  None.    INDICATIONS:  The patient is a 57-year-old white male with worsening shortness   of breath and substernal chest pressure.  Echocardiography showed severe   calcific aortic stenosis.  Cardiac catheterization did not show any   hemodynamically significant coronary artery disease.    DESCRIPTION OF PROCEDURE:  The patient was brought to the operating room and   placed on the operating room table in the supine position.  After successful   induction of general anesthesia and endotracheal intubation, the patient was   prepped and draped in the usual sterile fashion.  Dr. Coffey was the first   assistant and he assisted with retraction during the operation and SHER San also assisted with retraction and closed the sternal wound.    Intraoperative transesophageal echocardiography showed severe calcific aortic   stenosis and a bicuspid aortic valve.  There was trace mitral regurgitation.    His left ventricular ejection fraction was normal at approximately 50%.  An   incision was made from the sternal notch to the xiphoid.   The sternum was   opened longitudinally with a sternal saw.  Hemostasis was obtained with   electrocautery and a limited amount of bone wax to the sternal edges.  The   patient was systemically heparinized.  The pericardium was opened   longitudinally and tented anteriorly with Ethibond stay stitches.  The aortic   cannula was inserted first followed by dual-stage venous cannula.  An   antegrade cardioplegia cannula was placed in the ascending aorta.    Cardiopulmonary bypass was instituted.  The aorta was cross-clamped and the   patient was given 1 L of cold cardioplegia in an antegrade fashion.  There was   delayed cardiac arrest.  Ice slush was placed on the heart for further   myocardial protection.  A phrenic nerve protector pad was used.  From this   point on, cardioplegia was given in an antegrade fashion utilizing direct   coronary perfusion every 20 minutes while the aorta was crossclamped.  A   transverse aortotomy was performed.  The bicuspid aortic valve was heavily   calcified and very thick.  There was fusion of the left and right coronary   leaflets.  The aortic valve was sharply excised and a rongeur was used to   debride the annulus.  Pledgeted #2-0 Ethibond stitches were then placed around   the aortic valve annulus in a horizontal mattress fashion with the pledgets   in a subannular position.  A 29 mm Forbes Perimount Magna pericardial valve   was then placed in the aortic valve annulus and secured in place with the   Ethibond stitches utilizing the Cor-Knot device.  The valve was properly   positioned and both coronary ostia were visualized and were patent.  Rewarming   of the patient was initiated.  The aortotomy was closed in 2 layers using   #5-0 Prolene sutures.  The aortic cross-clamp was removed.  The aortic   cross-clamp time was 72 minutes.  Total cardiopulmonary bypass time was 89   minutes.  The left ventricle was deaired in the usual fashion.  The carbon   dioxide, which had been  released over the operative field during the operation   was discontinued.  A straight and an angled 32-Divehi chest tubes were placed   in the mediastinum.  Temporary epicardial ventricular pacemaker wires were   inserted and the patient was externally paced in a ventricular fashion at the   rate of 90.  He eventually returned to sinus rhythm and the external pacing   was discontinued.  The antegrade cardioplegia cannula was removed.  When he   was adequately warmed, he was slowly taken off cardiopulmonary bypass, which   he tolerated well.  The dual-stage venous cannula was removed.  Protamine was   given to reverse the effects of heparin.  The aortic cannula was removed.    When adequate hemostasis had been obtained, the sternum was reapproximated   using large zip ties.  This was done due to the patient's reported Nickel allergy.    Therefore, sternal wires were not used.  A total of 4 zip ties were applied to   the sternum.  The remainder of the sternal incision was closed in 3 layers   using Vicryl sutures.  Intraoperative transesophageal echocardiography showed   the properly positioned and functioning aortic valve bioprosthesis without any   paravalvular or central leaks.  His peak and mean transvalvular gradients   were now only 7 and 3 mmHg respectively.  There was still trace mitral   regurgitation.  His left ventricular ejection fraction remained the same at   approximately 50%.  There were no apparent complications.  The patient   tolerated the procedure well and left the operating room in guarded condition.       ____________________________________     MD SHILOH MORENO / STARR    DD:  04/10/2017 16:14:37  DT:  04/10/2017 18:53:07    D#:  [id number]  Job#:  [id number]    cc: [facility]

## 2017-04-11 NOTE — PROGRESS NOTES
Patient transported to the floor with OR team. Patient attached to all monitors, drips verified, CXR verified, Labs sent. Bedside report received from MD Lackey. Patient currently pacing 100%. Pacer paused, underlying rhythm SB 54, blood pressure maintaining. Pacer set with back up rate of VVI 45/10/2. EKG called.

## 2017-04-11 NOTE — PROGRESS NOTES
Patient successfully meeting extubation parameters.  VC: 1200, Nif -27 Pinsp: 5 peep: 8 FiO2 40% pH 7.35 SpO2 95% PaCO2 36.4  Patient extubated to 3L O2

## 2017-04-11 NOTE — RESPIRATORY CARE
Extubation    Cuff leak noted Yes  Stridor present No              Patient toleration Well  RCP Complete? Pending   Events/Summary/Plan: Pt met all parameters and was extubated (04/10/17 2025)

## 2017-04-11 NOTE — CARE PLAN
Problem: Post Op Day 1 CABG/Heart Valve Replacement  Goal: Optimal care of the post op CABG/heart valve replacement Post Op Day 1  Intervention: EKG and CXR completed  Completed, see results review  Intervention: All valve patients: PT/INR daily  Morning labs: pt 14.6 INR 1.10  Intervention: Antibiotics are discontinued within 24 hours of anesthesia end time unless indication documented for continuation beyond 24 hours  Completed  Intervention: Up in chair for all meals  Patient O2 demands increased this am, will get up to chair later in the morning  Intervention: Discontinue orozco catheter unless documented reason for continuation  Orozco in place  Intervention: Remove original surgical dressing after 24 hrs, leave open to air unless otherwise specified by physician  To be removed at 1600  Intervention: Consider chest tube removal by MD  CT in place  Intervention: IS q 1 hour while awake and record best IS volume  Best IS: 1500  Intervention: Graduated elastic stockings  GURMEET's in use  Intervention: Saline lock IV  Cleviprex in use  Intervention: After 24th hour post-anesthesia end time, transition patient to Cardiac Surgery SQ Insulin Protocol  Insulin gtt to be discontinued at 1600

## 2017-04-11 NOTE — CARE PLAN
Problem: Nutritional:  Goal: Achieve adequate nutritional intake  Patient will consume at least 50% of meals when diet advanced from clear liquids.   Outcome: NOT MET

## 2017-04-11 NOTE — CARE PLAN
Problem: Hyperinflation:  Goal: Prevent or improve atelectasis  Outcome: PROGRESSING AS EXPECTED  Intervention: Instruct incentive spirometry usage  Continue to encourage IS. Initiate PEP if VC fails to improve.

## 2017-04-12 LAB
ANION GAP SERPL CALC-SCNC: 3 MMOL/L (ref 0–11.9)
BUN SERPL-MCNC: 25 MG/DL (ref 8–22)
CALCIUM SERPL-MCNC: 7.9 MG/DL (ref 8.5–10.5)
CHLORIDE SERPL-SCNC: 97 MMOL/L (ref 96–112)
CO2 SERPL-SCNC: 27 MMOL/L (ref 20–33)
CREAT SERPL-MCNC: 0.93 MG/DL (ref 0.5–1.4)
ERYTHROCYTE [DISTWIDTH] IN BLOOD BY AUTOMATED COUNT: 44.4 FL (ref 35.9–50)
GFR SERPL CREATININE-BSD FRML MDRD: >60 ML/MIN/1.73 M 2
GLUCOSE BLD-MCNC: 100 MG/DL (ref 65–99)
GLUCOSE BLD-MCNC: 103 MG/DL (ref 65–99)
GLUCOSE BLD-MCNC: 104 MG/DL (ref 65–99)
GLUCOSE BLD-MCNC: 110 MG/DL (ref 65–99)
GLUCOSE BLD-MCNC: 153 MG/DL (ref 65–99)
GLUCOSE BLD-MCNC: 80 MG/DL (ref 65–99)
GLUCOSE BLD-MCNC: 84 MG/DL (ref 65–99)
GLUCOSE SERPL-MCNC: 108 MG/DL (ref 65–99)
HCT VFR BLD AUTO: 36 % (ref 42–52)
HGB BLD-MCNC: 12 G/DL (ref 14–18)
INR PPP: 1.11 (ref 0.87–1.13)
LV EJECT FRACT  99904: 45
MCH RBC QN AUTO: 31.7 PG (ref 27–33)
MCHC RBC AUTO-ENTMCNC: 33.3 G/DL (ref 33.7–35.3)
MCV RBC AUTO: 95.2 FL (ref 81.4–97.8)
PLATELET # BLD AUTO: 119 K/UL (ref 164–446)
PMV BLD AUTO: 10.6 FL (ref 9–12.9)
POTASSIUM SERPL-SCNC: 4.6 MMOL/L (ref 3.6–5.5)
PROTHROMBIN TIME: 14.7 SEC (ref 12–14.6)
RBC # BLD AUTO: 3.78 M/UL (ref 4.7–6.1)
SODIUM SERPL-SCNC: 127 MMOL/L (ref 135–145)
WBC # BLD AUTO: 13.6 K/UL (ref 4.8–10.8)

## 2017-04-12 PROCEDURE — A9270 NON-COVERED ITEM OR SERVICE: HCPCS | Performed by: NURSE PRACTITIONER

## 2017-04-12 PROCEDURE — 700111 HCHG RX REV CODE 636 W/ 250 OVERRIDE (IP): Performed by: NURSE PRACTITIONER

## 2017-04-12 PROCEDURE — 700112 HCHG RX REV CODE 229: Performed by: NURSE PRACTITIONER

## 2017-04-12 PROCEDURE — 80048 BASIC METABOLIC PNL TOTAL CA: CPT

## 2017-04-12 PROCEDURE — 94668 MNPJ CHEST WALL SBSQ: CPT

## 2017-04-12 PROCEDURE — 700102 HCHG RX REV CODE 250 W/ 637 OVERRIDE(OP)

## 2017-04-12 PROCEDURE — 85027 COMPLETE CBC AUTOMATED: CPT

## 2017-04-12 PROCEDURE — A9270 NON-COVERED ITEM OR SERVICE: HCPCS

## 2017-04-12 PROCEDURE — 700102 HCHG RX REV CODE 250 W/ 637 OVERRIDE(OP): Performed by: NURSE PRACTITIONER

## 2017-04-12 PROCEDURE — 770020 HCHG ROOM/CARE - TELE (206)

## 2017-04-12 PROCEDURE — 99233 SBSQ HOSP IP/OBS HIGH 50: CPT | Performed by: INTERNAL MEDICINE

## 2017-04-12 PROCEDURE — 97162 PT EVAL MOD COMPLEX 30 MIN: CPT

## 2017-04-12 PROCEDURE — 82962 GLUCOSE BLOOD TEST: CPT | Mod: 91

## 2017-04-12 PROCEDURE — 85610 PROTHROMBIN TIME: CPT

## 2017-04-12 PROCEDURE — G8978 MOBILITY CURRENT STATUS: HCPCS | Mod: CJ

## 2017-04-12 PROCEDURE — G8979 MOBILITY GOAL STATUS: HCPCS | Mod: CI

## 2017-04-12 RX ORDER — LISINOPRIL 5 MG/1
5 TABLET ORAL
Status: DISCONTINUED | OUTPATIENT
Start: 2017-04-13 | End: 2017-04-14 | Stop reason: HOSPADM

## 2017-04-12 RX ORDER — ALPRAZOLAM 0.5 MG/1
0.5 TABLET ORAL 4 TIMES DAILY PRN
Status: DISCONTINUED | OUTPATIENT
Start: 2017-04-12 | End: 2017-04-14 | Stop reason: HOSPADM

## 2017-04-12 RX ORDER — CALCIUM CARBONATE 500 MG/1
500 TABLET, CHEWABLE ORAL 4 TIMES DAILY PRN
Status: DISCONTINUED | OUTPATIENT
Start: 2017-04-12 | End: 2017-04-14 | Stop reason: HOSPADM

## 2017-04-12 RX ADMIN — DOCUSATE SODIUM 100 MG: 100 CAPSULE ORAL at 08:00

## 2017-04-12 RX ADMIN — OXYCODONE HYDROCHLORIDE 10 MG: 10 TABLET ORAL at 08:35

## 2017-04-12 RX ADMIN — CARVEDILOL 3.12 MG: 3.12 TABLET, FILM COATED ORAL at 08:00

## 2017-04-12 RX ADMIN — DIPHENHYDRAMINE HCL 25 MG: 25 TABLET ORAL at 03:12

## 2017-04-12 RX ADMIN — TRAMADOL HYDROCHLORIDE 50 MG: 50 TABLET, FILM COATED ORAL at 20:46

## 2017-04-12 RX ADMIN — POTASSIUM CHLORIDE 20 MEQ: 1500 TABLET, EXTENDED RELEASE ORAL at 08:00

## 2017-04-12 RX ADMIN — OXYCODONE HYDROCHLORIDE 5 MG: 5 TABLET ORAL at 22:54

## 2017-04-12 RX ADMIN — WARFARIN SODIUM 7.5 MG: 7.5 TABLET ORAL at 19:52

## 2017-04-12 RX ADMIN — PRAVASTATIN SODIUM 40 MG: 20 TABLET ORAL at 21:00

## 2017-04-12 RX ADMIN — ASPIRIN 81 MG: 81 TABLET ORAL at 08:00

## 2017-04-12 RX ADMIN — STANDARDIZED SENNA CONCENTRATE AND DOCUSATE SODIUM 1 TABLET: 8.6; 5 TABLET, FILM COATED ORAL at 20:46

## 2017-04-12 RX ADMIN — OXYCODONE HYDROCHLORIDE 10 MG: 10 TABLET ORAL at 16:30

## 2017-04-12 RX ADMIN — CARVEDILOL 3.12 MG: 3.12 TABLET, FILM COATED ORAL at 16:22

## 2017-04-12 RX ADMIN — INSULIN HUMAN 10 UNITS: 100 INJECTION, SUSPENSION SUBCUTANEOUS at 05:52

## 2017-04-12 RX ADMIN — OXYCODONE HYDROCHLORIDE 10 MG: 10 TABLET ORAL at 02:01

## 2017-04-12 RX ADMIN — MAGNESIUM SULFATE IN DEXTROSE 1 G: 10 INJECTION, SOLUTION INTRAVENOUS at 16:22

## 2017-04-12 RX ADMIN — LISINOPRIL 10 MG: 10 TABLET ORAL at 08:00

## 2017-04-12 RX ADMIN — LEVOTHYROXINE SODIUM 25 MCG: 50 TABLET ORAL at 05:19

## 2017-04-12 RX ADMIN — OXYCODONE HYDROCHLORIDE 10 MG: 10 TABLET ORAL at 05:19

## 2017-04-12 RX ADMIN — OXYCODONE HYDROCHLORIDE 5 MG: 5 TABLET ORAL at 19:51

## 2017-04-12 RX ADMIN — FUROSEMIDE 40 MG: 10 INJECTION, SOLUTION INTRAMUSCULAR; INTRAVENOUS at 08:00

## 2017-04-12 ASSESSMENT — ENCOUNTER SYMPTOMS
GASTROINTESTINAL NEGATIVE: 1
MUSCULOSKELETAL NEGATIVE: 1
CARDIOVASCULAR NEGATIVE: 1
CONSTITUTIONAL NEGATIVE: 1
RESPIRATORY NEGATIVE: 1
EYES NEGATIVE: 1
NEUROLOGICAL NEGATIVE: 1
PSYCHIATRIC NEGATIVE: 1

## 2017-04-12 ASSESSMENT — PAIN SCALES - GENERAL
PAINLEVEL_OUTOF10: 6
PAINLEVEL_OUTOF10: 5
PAINLEVEL_OUTOF10: 6
PAINLEVEL_OUTOF10: 3
PAINLEVEL_OUTOF10: 6
PAINLEVEL_OUTOF10: 0
PAINLEVEL_OUTOF10: 6
PAINLEVEL_OUTOF10: 5
PAINLEVEL_OUTOF10: 2
PAINLEVEL_OUTOF10: 3
PAINLEVEL_OUTOF10: 6
PAINLEVEL_OUTOF10: 6
PAINLEVEL_OUTOF10: 5
PAINLEVEL_OUTOF10: 3
PAINLEVEL_OUTOF10: 4
PAINLEVEL_OUTOF10: 0
PAINLEVEL_OUTOF10: 6

## 2017-04-12 ASSESSMENT — GAIT ASSESSMENTS
DEVIATION: BRADYKINETIC;INCREASED BASE OF SUPPORT
DISTANCE (FEET): 400
ASSISTIVE DEVICE: WHEELCHAIR PUSH
GAIT LEVEL OF ASSIST: CONTACT GUARD ASSIST

## 2017-04-12 NOTE — THERAPY
"Physical Therapy Evaluation completed.   Bed Mobility:  Supine to Sit:  (Nt, reporting he is going to rent a recliner to sleep in )  Transfers: Sit to Stand: Contact Guard Assist (no device; VC for sequencing)  Gait: Level Of Assist: Contact Guard Assist with wheelchair push  Plan of Care: Will benefit from Physical Therapy 3 times per week  Discharge Recommendations: Equipment: Will Continue to Assess for Equipment Needs.   Pt presents with impaired activity tolerance, LE endurance, ventilatory pump s/p OHS. Pt is most limited by lethragy, reporting from AM meds; physically, doing quite well; anticipate will progress to home d/c when medically appropriate to do so. Acute PT will conitnue to follow.   See \"Rehab Therapy-Acute\" Patient Summary Report for complete documentation.     "

## 2017-04-12 NOTE — CARE PLAN
Problem: Hyperinflation:  Goal: Prevent or improve atelectasis  Outcome: PROGRESSING AS EXPECTED  Intervention: Instruct incentive spirometry usage  Continue to encourage IS/PEP

## 2017-04-12 NOTE — PROGRESS NOTES
Pulmonary Critical Care Progress Note        Chief Complaint: AVR    History of Present Illness:  57-year-old gentleman with known past    medical history of severe aortic stenosis, hypercholesterolemia, and    hypothyroidism that came in for elective aortic valve replacement.  Patient is   postop at this present time, currently on ventilator and Cleviprex at 2 mg    per hour, Precedex 0.25, insulin at 1 and is slowly waking and following    commands.  Reportedly, uneventful procedure.    ROS:  Respiratory: negative cough and negative shortness of breath, Cardiac: positive chest pain, GI: negative nausea and negative vomiting.  All other systems negative.    Interval Events:  24 hour interval history reviewed   - tmax 97.9   - (-)255cc over last 24hr   - extubated 4/12   - 2Lnc 97%      PFSH:  No change.    Respiratory:     Pulse Oximetry: 97 %  Chest Tube Drains:          Exam: unlabored respirations, no intercostal retractions or accessory muscle use and diminished breath sounds mild  ImagingAvailable data reviewed   Recent Labs      04/10/17   1922  04/10/17   2011  04/11/17   0750   ISTATAPH  7.364*  7.354*  7.414   ISTATAPCO2  36.3  36.9  35.0   ISTATAPO2  76  87  57*   ISTATATCO2  22  22  23   [id number]  95  96  90*   ISTATARTHCO3  20.7  20.5  22.4   ISTATARTBE  -4  -4  -2   ISTATTEMP  36.5 C  36.7 C  36.9 C   ISTATFIO2  50  40  7   ISTATSPEC  Arterial  Arterial  Arterial   ISTATAPHTC  7.371*  7.358*  7.415   UYRQLSGM2PR  74  86  57*       HemoDynamics:  Pulse: 64, Heart Rate (Monitored): 64  Blood Pressure: 100/57 mmHg (seated post ambulation), NIBP: 100/53 mmHg      Exam: regular rate and rhythm  Imaging: Available data reviewed        Neuro:  GCS Total [place] Coma Score: 15       Exam: no focal deficits noted  Imaging: Available data reviewed    Fluids:  Intake/Output       04/10/17 0700 - 04/11/17 0659 04/11/17 0700 - 04/12/17 0659 04/12/17 0700 - 04/13/17 0659      1697-1021 2557-6313 Total 0900-6395  2774-5302 Total 0794-8556 8614-3348 Total       Intake    P.O.  --  540 540  2270  -- 2270  700  -- 700    P.O. --  -- 2270 700 -- 700    I.V.  35.1  1354.2 1389.3  254.7  -- 254.7  --  -- --    Clevidipine Volume 1.9 27.8 29.6 45.3 -- 45.3 -- -- --    Precedex Volume 23.1 19.3 42.4 -- -- -- -- -- --    Insulin Volume 7.9 138.5 146.4 89.4 -- 89.4 -- -- --    Epinephrine Volume 2.3 28.7 31 -- -- -- -- -- --    IV Volume (PlasmaLyte) -- 500 500 -- -- -- -- -- --    IV Volume (NS TKO) -- 90 90 20 -- 20 -- -- --    IV Piggyback Volume (IV Piggyback) -- 550 550 100 -- 100 -- -- --    Blood  340  -- 340  --  -- --  --  -- --    Cell Saver Volume (mL) 340 -- 340 -- -- -- -- -- --    Total Intake 375.1 1894.2 2269.3 2524.7 -- 2524.7 700 -- 700       Output    Urine  1200  848 2048  2075  650 2725  700  -- 700    Number of Times Voided -- -- -- -- -- -- 3 x -- 3 x    Indwelling Cathether  2075 650 2725 700 -- 700    Void (ml) 600 -- 600 -- -- -- -- -- --    Drains  20  265 285  55  -- 55  --  -- --    Mediastinal Chest Tube 1 20 265 285 55 -- 55 -- -- --    Total Output 1220 1113 2333 2130 650 2780 700 -- 700       Net I/O     -844.9 781.2 -63.7 394.7 -650 -255.3 0 -- 0          Recent Labs      04/10/17   1635   04/11/17   0454  04/11/17   1131  04/12/17   0549   SODIUM   --    --   132*   --   127*   POTASSIUM   --    < >  4.4  4.0  4.6   CHLORIDE   --    --   104   --   97   CO2   --    --   23   --   27   BUN   --    --   19   --   25*   CREATININE   --    --   0.76   --   0.93   MAGNESIUM  2.2   --    --    --    --    CALCIUM   --    --   7.6*   --   7.9*    < > = values in this interval not displayed.       GI/Nutrition:  Exam: abdomen is soft and non-tender, normal active bowel sounds  Imaging: Available data reviewed  taking PO  Liver Function  Recent Labs      04/11/17   0454  04/12/17   0549   GLUCOSE  120*  108*       Heme:  Recent Labs      04/10/17   1635  04/11/17   0454  04/12/17    0549   RBC   --   3.96*  3.78*   HEMOGLOBIN   --   12.6*  12.0*   HEMATOCRIT   --   36.7*  36.0*   PLATELETCT  148*  143*  119*   PROTHROMBTM  16.8*  14.6  14.7*   APTT  27.3   --    --    INR  1.32*  1.10  1.11       Infectious Disease:  Temp  Av.4 °C (97.5 °F)  Min: 36 °C (96.8 °F)  Max: 36.6 °C (97.9 °F)  Micro: reviewed  Recent Labs      17   0454  17   0549   WBC  17.6*  13.6*     Current Facility-Administered Medications   Medication Dose Frequency Provider Last Rate Last Dose   • [START ON 2017] lisinopril (PRINIVIL) tablet 5 mg  5 mg Q DAY Brigid Loaiza, A.P.N.       • alprazolam (XANAX) tablet 0.5 mg  0.5 mg 4X/DAY PRN Brigid Loaiza, A.P.N.       • calcium carbonate (TUMS) chewable tab 500 mg  500 mg 4X/DAY PRN Brigid Loaiza, A.P.N.       • carvedilol (COREG) tablet 3.125 mg  3.125 mg BID WITH MEALS Brigid Loaiza A.P.N.   3.125 mg at 17 0800   • furosemide (LASIX) injection 40 mg  40 mg Q DAY Brigid Loaiza A.P.N.   40 mg at 17 0800   • potassium chloride SA (Kdur) tablet 20 mEq  20 mEq DAILY Brigid Loaiza A.P.N.   20 mEq at 17 0800   • ketorolac (TORADOL) injection 30 mg  30 mg Q6HRS PRN Brigid Loaiza A.P.N.   30 mg at 17 1106   • warfarin (COUMADIN) tablet 7.5 mg  7.5 mg COUMADIN-DAILY Monet Morris, PHARMD   7.5 mg at 17 1824   • pravastatin (PRAVACHOL) tablet 40 mg  40 mg Q EVENING Brigid Loaiza A.P.N.   40 mg at 17 2100   • levothyroxine (SYNTHROID) tablet 25 mcg  25 mcg AM ES Brigid Loaiza, A.P.N.   25 mcg at 17 0519   • Respiratory Care per Protocol   Continuous RT Brigid Loaiza A.P.N.       • NS infusion   Continuous Brigid Loaiza A.P.N. 10 mL/hr at 04/10/17 1732     • docusate sodium (COLACE) capsule 100 mg  100 mg QAM Brigid Loaiza A.P.N.   100 mg at 17 0800    And   • senna-docusate (PERICOLACE or SENOKOT S) 8.6-50 MG per tablet 1 Tab  1 Tab Nightly Brigid Loaiza A.P.N.   1 Tab at 17 1948    And   •  senna-docusate (PERICOLACE or SENOKOT S) 8.6-50 MG per tablet 1 Tab  1 Tab Q24HRS PRN Brigid Loaiza, A.P.N.        And   • lactulose 20 GM/30ML solution 30 mL  30 mL Q24HRS PRN Brigid Loaiza, A.P.N.        And   • bisacodyl (DULCOLAX) suppository 10 mg  10 mg Q24HRS PRN Brigid Loaiza, A.P.N.        And   • fleet enema 133 mL  1 Each Once PRN Brigid Loaiza, A.P.N.       • magnesium sulfate ivpb premix 1 g  1 g QDAY Brigid Loaiza A.P.N.   Stopped at 04/11/17 1745   • aspirin EC (ECOTRIN) tablet 81 mg  81 mg DAILY Brigid Loaiza A.P.N.   81 mg at 04/12/17 0800   • MD ALERT... warfarin (COUMADIN) per pharmacy protocol   pharmacy to dose Brigid Loaiza A.P.N.       • oxycodone immediate-release (ROXICODONE) tablet 5 mg  5 mg Q3HRS PRN Brigid Loaiza A.P.N.   5 mg at 04/10/17 2118   • oxycodone immediate release (ROXICODONE) tablet 10 mg  10 mg Q3HRS PRN Brigid Loaiza, A.P.N.   10 mg at 04/12/17 0835   • tramadol (ULTRAM) 50 MG tablet 50 mg  50 mg Q4HRS PRN Brigid Loaiza A.P.N.   50 mg at 04/11/17 0351   • ondansetron (ZOFRAN) syringe/vial injection 4 mg  4 mg Q6HRS PRN Brigid Loaiza, A.P.N.   4 mg at 04/11/17 0841    Or   • prochlorperazine (COMPAZINE) injection 10 mg  10 mg Q6HRS PRN Brigid Loaiza, A.P.N.        Or   • promethazine (PHENERGAN) suppository 25 mg  25 mg Q6HRS PRN Brigid Loaiza, A.P.N.       • acetaminophen (TYLENOL) tablet 650 mg  650 mg Q4HRS PRN Brigid Loaiza, A.P.N.        Or   • acetaminophen (TYLENOL) suppository 650 mg  650 mg Q4HRS PRN Brigid Loaiza, A.P.N.       • mag hydrox-al hydrox-simeth (MAALOX PLUS ES or MYLANTA DS) suspension 30 mL  30 mL Q4HRS PRN Brigid Loaiza, A.P.N.   30 mL at 04/11/17 0841   • diphenhydrAMINE (BENADRYL) tablet/capsule 25 mg  25 mg HS PRN - MR X 1 Brigid Loaiza, A.P.N.   25 mg at 04/12/17 0312   • dextrose 50% (D50W) injection 25 mL  25 mL PRN Monet Morris, PHARMD         Last reviewed on 4/10/2017 10:02 AM by Georgia Mathew R.N.    Quality   Measures:  Labs reviewed, Medications reviewed and Radiology images reviewed                      Assessment/Plan  -  Severe aortic stenosis, status post elective valve replacement on    04/10/2017.  -  Postoperative mechanical ventilatory support.   - extubated 4/12   - rt/02 protocols   - is/mobilization   - prn lasix   - 2L nc  -  History of hypothyroidism, on replacement therapy.  -  Dyslipidemia, on medication.    Discussed patient condition and risk of morbidity and/or mortality with RN, RT, Therapies and Pharmacy.

## 2017-04-12 NOTE — PROGRESS NOTES
Inpatient Anticoagulation Service Note    Date: 2017  Reason for Anticoagulation: Bioprosthetic Valve Replacement (AVR)        Hemoglobin Value: 12  Hematocrit Value: 36  Lab Platelet Value: 119  Target INR: 2.0 to 3.0    INR from last 7 days     Date/Time INR Value    17 0549 1.11    17 0454 1.1    04/10/17 1635 (!)1.32    17 0918 0.95        Dose from last 7 days     Date/Time Dose (mg)    17 0549 7.5    17 0454 7.5        Significant Interactions: Aspirin, NSAID, Statin, Thyroid Medications  Bridge Therapy: No     Comments: Patient is post op day 2 doing well. H/H was stable over night.  Continue warfarin 7.5 mg daily for now.  Based on the results of the INR for tomorrow, pharmacy may consider reducing the daily dose to 5 mg.     Plan:  INR with morning labs.  Education Material Provided?: Yes (AET )  Pharmacist suggested discharge dosin-7.5 mg daily     Monet Morris

## 2017-04-12 NOTE — THERAPY
Physical Therapy Contact Note    Pt just ambulated with RN staff; will return pre lunch for evaluation;     Virgie Xiao PT, DPT Pager: [phone]

## 2017-04-12 NOTE — CARE PLAN
Problem: Post Op Day 1 CABG/Heart Valve Replacement  Goal: Optimal care of the post op CABG/heart valve replacement Post Op Day 1  Outcome: PROGRESSING AS EXPECTED  Intervention: EKG and CXR completed  Complete  Intervention: All valve patients: PT/INR daily  Complete  Intervention: Antibiotics are discontinued within 24 hours of anesthesia end time unless indication documented for continuation beyond 24 hours  Complete  Intervention: Daily Weights  Patient will complete with first walk with day RN. Patient wanted to wait and sleep a bit longer due to anxiety and inability to sleep over night.  Intervention: Up in chair for all meals  Complete  Intervention: Ambulate in am if stable. First ambulation is 25 feet. Repeat x 3 as tolerated. Ambulate again before bed.  Ambulated 25 feet 4/11 with Day Rn. Patient wanted to wait for am walk so he could sleep longer.  Intervention: Discontinue orozco catheter unless documented reason for continuation  Complete  Intervention: Remove original surgical dressing after 24 hrs, leave open to air unless otherwise specified by physician  Complete  Intervention: Cardiac rehab phase 1 ordered and smoking cessation education and program referral as appropriate  Complete  Intervention: Consider chest tube removal by MD  Complete  Intervention: IS q 1 hour while awake and record best IS volume  Complete. Self motivated. 1300.  Intervention: Graduated elastic stockings  Complete  Intervention: Saline lock IV  Complete  Intervention: Transfer to [facility] status, begin VS q 4 hours  Complete  Intervention: After 24th hour post-anesthesia end time, transition patient to Cardiac Surgery SQ Insulin Protocol  Complete  Intervention: If patient is CABG or on home beta-blocker, start Beta-Blocker on POD 1 or POD 2 or contraindication documented  Complete

## 2017-04-12 NOTE — PROGRESS NOTES
Cardiovascular Surgery Progress Note    Name: Gagandeep Durbin Jr.  MRN: [id number]  : 1960  Admit Date: 4/10/2017  9:26 AM  Procedure:  Procedure(s) and Anesthesia Type:     * AORTIC VALVE REPLACEMENT - General     * KIARRA - General  2 Day Post-Op    Vitals:  Patient Vitals for the past 8 hrs:   Temp SpO2 O2 (LPM) Pulse Heart Rate (Monitored) Resp NIBP   17 0802 - 94 % 3 60 62 (!) 22 -   17 0600 - 93 % - 60 60 14 -   17 0515 - 93 % - 61 61 12 -   17 0500 - 93 % - 66 65 16 -   17 0400 36.6 °C (97.9 °F) 93 % - 60 64 14 109/67 mmHg   17 0300 - 93 % - (!) 58 (!) 58 (!) 9 -   17 0200 - 93 % - (!) 57 64 (!) 11 -   17 0100 - 92 % - 61 61 (!) 11 -     Temp (24hrs), Av.6 °C (97.9 °F), Min:36.5 °C (97.7 °F), Max:36.7 °C (98.1 °F)      Respiratory:    Respiration: (!) 22, Pulse Oximetry: 94 %, O2 Daily Delivery Respiratory : Silicone Nasal Cannula     Chest Tube Drains:     Mediastinal Chest Tube 1: 15 ml    Fluids:    Intake/Output Summary (Last 24 hours) at 17 0811  Last data filed at 17 0200   Gross per 24 hour   Intake 2336.07 ml   Output   2555 ml   Net -218.93 ml     Admit weight: Weight: 122.471 kg (270 lb)  Current weight: Weight: (!) 129.6 kg (285 lb 11.5 oz) (17 0800)    Labs:  Recent Labs      04/10/17   1635  17   0454  17   0549   WBC   --   17.6*  13.6*   RBC   --   3.96*  3.78*   HEMOGLOBIN   --   12.6*  12.0*   HEMATOCRIT   --   36.7*  36.0*   MCV   --   92.7  95.2   MCH   --   31.8  31.7   MCHC   --   34.3  33.3*   RDW   --   43.8  44.4   PLATELETCT  148*  143*  119*   MPV   --   10.5  10.6         Recent Labs      17   0454  17   1131  17   0549   SODIUM  132*   --   127*   POTASSIUM  4.4  4.0  4.6   CHLORIDE  104   --   97   CO2  23   --   27   GLUCOSE  120*   --   108*   BUN  19   --   25*   CREATININE  0.76   --   0.93   CALCIUM  7.6*   --   7.9*     Recent Labs      04/10/17   1635  17   0454  17    0549   APTT  27.3   --    --    INR  1.32*  1.10  1.11       Medications:  • [START ON 4/13/2017] lisinopril  5 mg     • carvedilol  3.125 mg     • furosemide  40 mg     • potassium chloride SA  20 mEq     • warfarin  7.5 mg     • pravastatin  40 mg     • levothyroxine  25 mcg     • docusate sodium  100 mg      And   • senna-docusate  1 Tab     • magnesium sulfate  1 g Stopped (04/11/17 5255)   • aspirin EC  81 mg     • MD ALERT... warfarin           Exam:   Review of Systems   Constitutional: Negative.    HENT: Negative.    Eyes: Negative.    Respiratory: Negative.    Cardiovascular: Negative.    Gastrointestinal: Negative.    Genitourinary: Negative.    Musculoskeletal: Negative.    Skin: Negative.    Neurological: Negative.    Endo/Heme/Allergies: Negative.    Psychiatric/Behavioral: Negative.        Physical Exam   Constitutional: He is oriented to person, place, and time. He appears well-developed and well-nourished. No distress.   Neck: Neck supple.   Cardiovascular: Normal rate, regular rhythm and normal heart sounds.  Exam reveals no gallop and no friction rub.    No murmur heard.  Pulmonary/Chest: Effort normal. No respiratory distress. He has decreased breath sounds in the right lower field and the left lower field. He has no wheezes. He has no rales.   Abdominal: Soft. He exhibits no distension. There is no tenderness.   Neurological: He is alert and oriented to person, place, and time.   Skin: Skin is warm and dry. He is not diaphoretic.       Quality Measures:     Bach catheter: One or Two Days Post Surgery (Day of Surgery being Day 0)  Central line in place: Need for access    DVT Prophylaxis: Warfarin (Coumadin)  DVT prophylaxis - mechanical: Not indicated at this time, ambulatory  Ulcer prophylaxis: Yes    Assessed for rehab: Patient returned to prior level of function, rehabilitation not indicated at this time      Assessment/Plan:  POD 1 - HTN- on cleviprex gtt- restart po meds, diurese, dc  swan/mediastinal tubes/orozco, add toradol for pain, amb, enc IS  POD 2 HDS, cont diuresis, dc wires, pain controlled, amb, enc IS    Patient seen, examined and plan reviewed with midlevel provider. I agree with the plan.      Active Hospital Problems    Diagnosis   • Aortic stenosis [I35.0]     Priority: Medium     ECHO -Aortic Valve  Aortic annular calcification. Highly calcified aortic valve leaflets.   Mild aortic insufficiency. Mild aortic stenosis. Transvalvular   gradients are - Peak: 33 mmHg Mean: 20 mmHg. Dimensionless index is   20/64=0.31.

## 2017-04-13 ENCOUNTER — APPOINTMENT (OUTPATIENT)
Dept: RADIOLOGY | Facility: MEDICAL CENTER | Age: 57
DRG: 220 | End: 2017-04-13
Attending: NURSE PRACTITIONER
Payer: COMMERCIAL

## 2017-04-13 LAB
ANION GAP SERPL CALC-SCNC: 6 MMOL/L (ref 0–11.9)
BUN SERPL-MCNC: 29 MG/DL (ref 8–22)
CALCIUM SERPL-MCNC: 8 MG/DL (ref 8.5–10.5)
CHLORIDE SERPL-SCNC: 92 MMOL/L (ref 96–112)
CO2 SERPL-SCNC: 28 MMOL/L (ref 20–33)
CREAT SERPL-MCNC: 1.06 MG/DL (ref 0.5–1.4)
ERYTHROCYTE [DISTWIDTH] IN BLOOD BY AUTOMATED COUNT: 42.9 FL (ref 35.9–50)
GFR SERPL CREATININE-BSD FRML MDRD: >60 ML/MIN/1.73 M 2
GLUCOSE SERPL-MCNC: 113 MG/DL (ref 65–99)
HCT VFR BLD AUTO: 32.7 % (ref 42–52)
HGB BLD-MCNC: 11.1 G/DL (ref 14–18)
INR PPP: 1.23 (ref 0.87–1.13)
MCH RBC QN AUTO: 32.1 PG (ref 27–33)
MCHC RBC AUTO-ENTMCNC: 33.9 G/DL (ref 33.7–35.3)
MCV RBC AUTO: 94.5 FL (ref 81.4–97.8)
PLATELET # BLD AUTO: 94 K/UL (ref 164–446)
PMV BLD AUTO: 10.3 FL (ref 9–12.9)
POTASSIUM SERPL-SCNC: 4.4 MMOL/L (ref 3.6–5.5)
PROTHROMBIN TIME: 15.9 SEC (ref 12–14.6)
RBC # BLD AUTO: 3.46 M/UL (ref 4.7–6.1)
SODIUM SERPL-SCNC: 126 MMOL/L (ref 135–145)
WBC # BLD AUTO: 8.8 K/UL (ref 4.8–10.8)

## 2017-04-13 PROCEDURE — A9270 NON-COVERED ITEM OR SERVICE: HCPCS | Performed by: NURSE PRACTITIONER

## 2017-04-13 PROCEDURE — 94668 MNPJ CHEST WALL SBSQ: CPT

## 2017-04-13 PROCEDURE — G8988 SELF CARE GOAL STATUS: HCPCS | Mod: CI

## 2017-04-13 PROCEDURE — 71020 DX-CHEST-2 VIEWS: CPT

## 2017-04-13 PROCEDURE — 700112 HCHG RX REV CODE 229: Performed by: NURSE PRACTITIONER

## 2017-04-13 PROCEDURE — 85610 PROTHROMBIN TIME: CPT

## 2017-04-13 PROCEDURE — 700111 HCHG RX REV CODE 636 W/ 250 OVERRIDE (IP): Performed by: NURSE PRACTITIONER

## 2017-04-13 PROCEDURE — 85027 COMPLETE CBC AUTOMATED: CPT

## 2017-04-13 PROCEDURE — 97165 OT EVAL LOW COMPLEX 30 MIN: CPT

## 2017-04-13 PROCEDURE — 80048 BASIC METABOLIC PNL TOTAL CA: CPT

## 2017-04-13 PROCEDURE — 99233 SBSQ HOSP IP/OBS HIGH 50: CPT | Performed by: INTERNAL MEDICINE

## 2017-04-13 PROCEDURE — 700102 HCHG RX REV CODE 250 W/ 637 OVERRIDE(OP)

## 2017-04-13 PROCEDURE — A9270 NON-COVERED ITEM OR SERVICE: HCPCS

## 2017-04-13 PROCEDURE — 700102 HCHG RX REV CODE 250 W/ 637 OVERRIDE(OP): Performed by: NURSE PRACTITIONER

## 2017-04-13 PROCEDURE — 770020 HCHG ROOM/CARE - TELE (206)

## 2017-04-13 PROCEDURE — G8987 SELF CARE CURRENT STATUS: HCPCS | Mod: CJ

## 2017-04-13 RX ORDER — POTASSIUM CHLORIDE 20 MEQ/1
20 TABLET, EXTENDED RELEASE ORAL 2 TIMES DAILY
Status: DISCONTINUED | OUTPATIENT
Start: 2017-04-13 | End: 2017-04-14 | Stop reason: HOSPADM

## 2017-04-13 RX ORDER — FUROSEMIDE 10 MG/ML
40 INJECTION INTRAMUSCULAR; INTRAVENOUS
Status: DISCONTINUED | OUTPATIENT
Start: 2017-04-13 | End: 2017-04-14 | Stop reason: HOSPADM

## 2017-04-13 RX ADMIN — CARVEDILOL 3.12 MG: 3.12 TABLET, FILM COATED ORAL at 07:37

## 2017-04-13 RX ADMIN — OXYCODONE HYDROCHLORIDE 10 MG: 10 TABLET ORAL at 18:30

## 2017-04-13 RX ADMIN — CARVEDILOL 3.12 MG: 3.12 TABLET, FILM COATED ORAL at 17:07

## 2017-04-13 RX ADMIN — POTASSIUM CHLORIDE 20 MEQ: 1500 TABLET, EXTENDED RELEASE ORAL at 21:30

## 2017-04-13 RX ADMIN — TRAMADOL HYDROCHLORIDE 50 MG: 50 TABLET, FILM COATED ORAL at 00:49

## 2017-04-13 RX ADMIN — OXYCODONE HYDROCHLORIDE 10 MG: 10 TABLET ORAL at 21:30

## 2017-04-13 RX ADMIN — OXYCODONE HYDROCHLORIDE 5 MG: 5 TABLET ORAL at 09:16

## 2017-04-13 RX ADMIN — PRAVASTATIN SODIUM 40 MG: 20 TABLET ORAL at 21:31

## 2017-04-13 RX ADMIN — POTASSIUM CHLORIDE 20 MEQ: 1500 TABLET, EXTENDED RELEASE ORAL at 07:36

## 2017-04-13 RX ADMIN — OXYCODONE HYDROCHLORIDE 10 MG: 10 TABLET ORAL at 12:16

## 2017-04-13 RX ADMIN — OXYCODONE HYDROCHLORIDE 5 MG: 5 TABLET ORAL at 06:15

## 2017-04-13 RX ADMIN — TRAMADOL HYDROCHLORIDE 50 MG: 50 TABLET, FILM COATED ORAL at 07:37

## 2017-04-13 RX ADMIN — FUROSEMIDE 40 MG: 10 INJECTION, SOLUTION INTRAMUSCULAR; INTRAVENOUS at 15:12

## 2017-04-13 RX ADMIN — STANDARDIZED SENNA CONCENTRATE AND DOCUSATE SODIUM 1 TABLET: 8.6; 5 TABLET, FILM COATED ORAL at 21:30

## 2017-04-13 RX ADMIN — LISINOPRIL 5 MG: 5 TABLET ORAL at 07:37

## 2017-04-13 RX ADMIN — OXYCODONE HYDROCHLORIDE 5 MG: 5 TABLET ORAL at 02:07

## 2017-04-13 RX ADMIN — WARFARIN SODIUM 7.5 MG: 7.5 TABLET ORAL at 17:07

## 2017-04-13 RX ADMIN — FUROSEMIDE 40 MG: 10 INJECTION, SOLUTION INTRAMUSCULAR; INTRAVENOUS at 07:36

## 2017-04-13 RX ADMIN — OXYCODONE HYDROCHLORIDE 10 MG: 10 TABLET ORAL at 15:16

## 2017-04-13 RX ADMIN — DOCUSATE SODIUM 100 MG: 100 CAPSULE ORAL at 07:36

## 2017-04-13 RX ADMIN — LEVOTHYROXINE SODIUM 25 MCG: 50 TABLET ORAL at 06:15

## 2017-04-13 ASSESSMENT — ENCOUNTER SYMPTOMS
GASTROINTESTINAL NEGATIVE: 1
NEUROLOGICAL NEGATIVE: 1
CONSTITUTIONAL NEGATIVE: 1
CARDIOVASCULAR NEGATIVE: 1
PSYCHIATRIC NEGATIVE: 1
EYES NEGATIVE: 1
MUSCULOSKELETAL NEGATIVE: 1
RESPIRATORY NEGATIVE: 1

## 2017-04-13 ASSESSMENT — PAIN SCALES - GENERAL
PAINLEVEL_OUTOF10: 4
PAINLEVEL_OUTOF10: 6
PAINLEVEL_OUTOF10: 4
PAINLEVEL_OUTOF10: 4
PAINLEVEL_OUTOF10: 6
PAINLEVEL_OUTOF10: 4
PAINLEVEL_OUTOF10: 6
PAINLEVEL_OUTOF10: 5
PAINLEVEL_OUTOF10: 4
PAINLEVEL_OUTOF10: 4
PAINLEVEL_OUTOF10: 6
PAINLEVEL_OUTOF10: 4
PAINLEVEL_OUTOF10: 6
PAINLEVEL_OUTOF10: 4
PAINLEVEL_OUTOF10: 6
PAINLEVEL_OUTOF10: 4
PAINLEVEL_OUTOF10: 4
PAINLEVEL_OUTOF10: 5

## 2017-04-13 ASSESSMENT — ACTIVITIES OF DAILY LIVING (ADL): TOILETING: INDEPENDENT

## 2017-04-13 NOTE — CARE PLAN
Problem: Nutritional:  Goal: Patient to verbalize or demonstrate understanding of diet  Outcome: MET Date Met:  04/13/17  Diet education completed with pt and family.

## 2017-04-13 NOTE — CARE PLAN
Problem: Post Op Day 3 CABG/Heart Valve replacement  Goal: Optimal care of the post op CABG/Heart Valve replacement post op day 3  Intervention: Daily Weights  done  Intervention: Ambulate, increasing the distance each time x 3 and before bed  done  Intervention: IS q 1 hour while awake and record best IS volume  done  Intervention: Consider removal of orozco, chest tube and pacer wire if not already done  removed

## 2017-04-13 NOTE — DISCHARGE PLANNING
HHC order received.  Discussed with patient and choice form completed for [facility]. Faxed to [facility].  Wait for medical clearance.  Copy to Patient.

## 2017-04-13 NOTE — FACE TO FACE
Face to Face Supporting Documentation - Home Health    The encounter with this patient was in whole or in part the primary reason for home health admission.    Date of encounter:   Patient:                    MRN:                       YOB: 2019  Gagandeep Durbin Jr.  [id number]  1960     Home health to see patient for:  Skilled Nursing care for assessment, interventions & education    Skilled need for:  Surgical Aftercare wound care, vital signs    Skilled nursing interventions to include:  Wound Care    Homebound status evidenced by:  Need the aid of supportive devices such as crutches, canes, wheelchairs or walkers. Leaving home requires a considerable and taxing effort. There is a normal inability to leave the home.    Community Physician to provide follow up care: FELIX Fournier     Optional Interventions? No      I certify the face to face encounter for this home health care referral meets the CMS requirements and the encounter/clinical assessment with the patient was, in whole, or in part, for the medical condition(s) listed above, which is the primary reason for home health care. Based on my clinical findings: the service(s) are medically necessary, support the need for home health care, and the homebound criteria are met.  I certify that this patient has had a face to face encounter by myself.  CATRACHO Ramos. - NPI: [id number]

## 2017-04-13 NOTE — DISCHARGE PLANNING
Referral has been sent to [facility].  [facility]  has denied patient, [facility] does not service the [facility].

## 2017-04-13 NOTE — THERAPY
"Occupational Therapy Evaluation completed.   Functional Status:  Pt seen today for OT evaluation. Pt pleasant and cooperative, much more alert than yesterday. Family present throughout session. Pt edu on sternal precautions and compensatory strategies during ADLs, appropriate AE/DME, and energy conservation techs. Pt's wife/OTR assisted patient with donning stockings/socks/PJ bottoms. Edu on AE to increase independence with this - pt and wife receptive. Will follow up one more time if needed for AE demo/edu.  Plan of Care: Will benefit from Occupational Therapy 2 times per week  Discharge Recommendations:  Equipment: Shower Chair and reacher, and longhandled sponge. Post-acute therapy Discharge to home with outpatient or home health for additional skilled therapy services.    See \"Rehab Therapy-Acute\" Patient Summary Report for complete documentation.    "

## 2017-04-13 NOTE — CARE PLAN
Problem: Safety  Goal: Will remain free from injury  Intervention: Provide assistance with mobility  Pt was mobilized twice during the shift. Tolerated well with wheelchair push.

## 2017-04-13 NOTE — DISCHARGE PLANNING
[facility] has accepted, need to be notified when patient has been d/c'd. ALEK Lei has been notified via voicemail.

## 2017-04-13 NOTE — PROGRESS NOTES
Cardiovascular Surgery Progress Note    Name: Gagandeep Durbin Jr.  MRN: [id number]  : 1960  Admit Date: 4/10/2017  9:26 AM  Procedure:  Procedure(s) and Anesthesia Type:     * AORTIC VALVE REPLACEMENT - General     * KIARRA - General  3 Day Post-Op    Vitals:  Patient Vitals for the past 8 hrs:   Temp SpO2 O2 Delivery O2 (LPM) Pulse Resp BP NIBP Weight   17 0903 - 95 % - 2 62 16 - - -   17 0800 36.4 °C (97.5 °F) 97 % Silicone Nasal Cannula 2 61 14 110/60 mmHg - -   17 0600 - - - - 67 - - 133/69 mmHg -   17 0400 36.2 °C (97.1 °F) 95 % Silicone Nasal Cannula 2.5 65 - - 133/69 mmHg (!) 130.1 kg (286 lb 13.1 oz)     Temp (24hrs), Av.1 °C (97 °F), Min:35.7 °C (96.3 °F), Max:36.4 °C (97.5 °F)      Respiratory:    Respiration: 16, Pulse Oximetry: 95 %, O2 Daily Delivery Respiratory : Silicone Nasal Cannula     Chest Tube Drains:          Fluids:    Intake/Output Summary (Last 24 hours) at 17 0927  Last data filed at 17 0900   Gross per 24 hour   Intake   1750 ml   Output   3100 ml   Net  -1350 ml     Admit weight: Weight: 122.471 kg (270 lb)  Current weight: Weight: (!) 130.1 kg (286 lb 13.1 oz) (17 0400)    Labs:  Recent Labs      17   0454  17   0549  17   0430   WBC  17.6*  13.6*  8.8   RBC  3.96*  3.78*  3.46*   HEMOGLOBIN  12.6*  12.0*  11.1*   HEMATOCRIT  36.7*  36.0*  32.7*   MCV  92.7  95.2  94.5   MCH  31.8  31.7  32.1   MCHC  34.3  33.3*  33.9   RDW  43.8  44.4  42.9   PLATELETCT  143*  119*  94*   MPV  10.5  10.6  10.3         Recent Labs      17   0454  17   1131  17   0549  17   0430   SODIUM  132*   --   127*  126*   POTASSIUM  4.4  4.0  4.6  4.4   CHLORIDE  104   --   97  92*   CO2  23   --   27  28   GLUCOSE  120*   --   108*  113*   BUN  19   --   25*  29*   CREATININE  0.76   --   0.93  1.06   CALCIUM  7.6*   --   7.9*  8.0*     Recent Labs      04/10/17   1635  17   0454  17   0549  17   0430   APTT   27.3   --    --    --    INR  1.32*  1.10  1.11  1.23*       Medications:  • furosemide  40 mg     • potassium chloride SA  20 mEq     • lisinopril  5 mg     • carvedilol  3.125 mg     • warfarin  7.5 mg     • pravastatin  40 mg     • levothyroxine  25 mcg     • docusate sodium  100 mg      And   • senna-docusate  1 Tab     • aspirin EC  81 mg     • MD ALERT... warfarin           Exam:   Review of Systems   Constitutional: Negative.    HENT: Negative.    Eyes: Negative.    Respiratory: Negative.    Cardiovascular: Negative.    Gastrointestinal: Negative.    Genitourinary: Negative.    Musculoskeletal: Negative.    Skin: Negative.    Neurological: Negative.    Endo/Heme/Allergies: Negative.    Psychiatric/Behavioral: Negative.        Physical Exam   Constitutional: He is oriented to person, place, and time. He appears well-developed and well-nourished. No distress.   Neck: Neck supple.   Cardiovascular: Normal rate, regular rhythm and normal heart sounds.  Exam reveals no gallop and no friction rub.    No murmur heard.  Pulmonary/Chest: Effort normal. No respiratory distress. He has decreased breath sounds in the right lower field and the left lower field. He has no wheezes. He has no rales.   Abdominal: Soft. He exhibits no distension. There is no tenderness.   Neurological: He is alert and oriented to person, place, and time.   Skin: Skin is warm and dry. He is not diaphoretic.       Quality Measures:     Orozco catheter: One or Two Days Post Surgery (Day of Surgery being Day 0)  Central line in place: Need for access    DVT Prophylaxis: Warfarin (Coumadin)  DVT prophylaxis - mechanical: Not indicated at this time, ambulatory  Ulcer prophylaxis: Yes    Assessed for rehab: Patient returned to prior level of function, rehabilitation not indicated at this time      Assessment/Plan:  POD 1 - HTN- on cleviprex gtt- restart po meds, diurese, dc swan/mediastinal tubes/orozco, add toradol for pain, amb, enc IS  POD 2 HDS,  cont diuresis, dc wires, pain controlled, amb, enc IS  POD 3 HDS, SR, inc diuresis, amb, enc IS    Patient seen, examined and plan reviewed with midlevel provider. I agree with the plan.      Active Hospital Problems    Diagnosis   • Aortic stenosis [I35.0]     Priority: Medium     ECHO -Aortic Valve  Aortic annular calcification. Highly calcified aortic valve leaflets.   Mild aortic insufficiency. Mild aortic stenosis. Transvalvular   gradients are - Peak: 33 mmHg Mean: 20 mmHg. Dimensionless index is   20/64=0.31.

## 2017-04-13 NOTE — PROGRESS NOTES
Pulmonary Critical Care Progress Note        Chief Complaint: AVR    History of Present Illness:  57-year-old gentleman with known past    medical history of severe aortic stenosis, hypercholesterolemia, and    hypothyroidism that came in for elective aortic valve replacement.  Patient is   postop at this present time, currently on ventilator and Cleviprex at 2 mg    per hour, Precedex 0.25, insulin at 1 and is slowly waking and following    commands.  Reportedly, uneventful procedure.    ROS:  Respiratory: negative cough and negative shortness of breath, Cardiac: positive chest pain, GI: negative nausea and negative vomiting.  All other systems negative.    Interval Events:  24 hour interval history reviewed   - tmax 97.1   - (-)300cc over last 24hr   - extubated 4/12   - 2.5Lnc 95%      PFSH:  No change.    Respiratory:     Pulse Oximetry: 95 %  Chest Tube Drains:          Exam: unlabored respirations, no intercostal retractions or accessory muscle use and diminished breath sounds mild  ImagingAvailable data reviewed   Recent Labs      04/10/17   1922  04/10/17   2011  04/11/17   0750   ISTATAPH  7.364*  7.354*  7.414   ISTATAPCO2  36.3  36.9  35.0   ISTATAPO2  76  87  57*   ISTATATCO2  22  22  23   [id number]  95  96  90*   ISTATARTHCO3  20.7  20.5  22.4   ISTATARTBE  -4  -4  -2   ISTATTEMP  36.5 C  36.7 C  36.9 C   ISTATFIO2  50  40  7   ISTATSPEC  Arterial  Arterial  Arterial   ISTATAPHTC  7.371*  7.358*  7.415   [id number]  74  86  57*       HemoDynamics:  Pulse: 67, Heart Rate (Monitored): 70  Blood Pressure: 100/57 mmHg (seated post ambulation), NIBP: 133/69 mmHg      Exam: regular rate and rhythm  Imaging: Available data reviewed        Neuro:  GCS Total Thania Coma Score: 15       Exam: no focal deficits noted  Imaging: Available data reviewed    Fluids:  Intake/Output       04/11/17 0700 - 04/12/17 0659 04/12/17 0700 - 04/13/17 0659 04/13/17 0700 - 04/14/17 0659      9394-2155 7670-6145 Total  0700-1859 1900-0659 Total 0700-1859 1900-0659 Total       Intake    P.O.  2270  -- 2270  950  800 1750  --  -- --    P.O. 2270 -- 2270  -- -- --    I.V.  254.7  -- 254.7  100  -- 100  --  -- --    Clevidipine Volume 45.3 -- 45.3 -- -- -- -- -- --    Insulin Volume 89.4 -- 89.4 -- -- -- -- -- --    IV Volume (NS TKO) 20 -- 20 -- -- -- -- -- --    IV Piggyback Volume (IV Piggyback) 100 -- 100 100 -- 100 -- -- --    Total Intake 2524.7 -- 2524.7 1757 620 6982 -- -- --       Output    Urine  2075  650 2725  1150  1000 2150  --  -- --    Number of Times Voided -- -- -- 6 x 4 x 10 x -- -- --    Indwelling Cathether 2075 650 2725 1150 -- 1150 -- -- --    Void (ml) -- -- -- -- 1000 1000 -- -- --    Drains  55  -- 55  --  -- --  --  -- --    Mediastinal Chest Tube 1 55 -- 55 -- -- -- -- -- --    Total Output 2130 650 2780 1150 1000 2150 -- -- --       Net I/O     394.7 -650 -255.3 -100 -200 -300 -- -- --        Weight: (!) 130.1 kg (286 lb 13.1 oz)  Recent Labs      04/10/17   1635   04/11/17   0454  04/11/17   1131  04/12/17   0549  04/13/17   0430   SODIUM   --    --   132*   --   127*  126*   POTASSIUM   --    < >  4.4  4.0  4.6  4.4   CHLORIDE   --    --   104   --   97  92*   CO2   --    --   23   --   27  28   BUN   --    --   19   --   25*  29*   CREATININE   --    --   0.76   --   0.93  1.06   MAGNESIUM  2.2   --    --    --    --    --    CALCIUM   --    --   7.6*   --   7.9*  8.0*    < > = values in this interval not displayed.       GI/Nutrition:  Exam: abdomen is soft and non-tender, normal active bowel sounds  Imaging: Available data reviewed  taking PO  Liver Function  Recent Labs      04/11/17   0454  04/12/17   0549  04/13/17   0430   GLUCOSE  120*  108*  113*       Heme:  Recent Labs      04/10/17   1635  04/11/17 0454  04/12/17   0549  04/13/17   0430   RBC   --   3.96*  3.78*  3.46*   HEMOGLOBIN   --   12.6*  12.0*  11.1*   HEMATOCRIT   --   36.7*  36.0*  32.7*   PLATELETCT  148*  143*  119*   94*   PROTHROMBTM  16.8*  14.6  14.7*  15.9*   APTT  27.3   --    --    --    INR  1.32*  1.10  1.11  1.23*       Infectious Disease:  Temp  Av.1 °C (96.9 °F)  Min: 35.7 °C (96.3 °F)  Max: 36.2 °C (97.1 °F)  Micro: reviewed  Recent Labs      17   0454  17   0549  17   0430   WBC  17.6*  13.6*  8.8     Current Facility-Administered Medications   Medication Dose Frequency Provider Last Rate Last Dose   • lisinopril (PRINIVIL) tablet 5 mg  5 mg Q DAY Brigid Loaiza, A.P.N.       • alprazolam (XANAX) tablet 0.5 mg  0.5 mg 4X/DAY PRN Briigd Loaiza, A.P.N.       • calcium carbonate (TUMS) chewable tab 500 mg  500 mg 4X/DAY PRN Brigid Loaiza, A.P.N.       • carvedilol (COREG) tablet 3.125 mg  3.125 mg BID WITH MEALS Brigid Loaiza A.P.N.   3.125 mg at 17 1622   • furosemide (LASIX) injection 40 mg  40 mg Q DAY Brigid Loaiza, A.P.N.   40 mg at 17 0800   • potassium chloride SA (Kdur) tablet 20 mEq  20 mEq DAILY Brigid Loaiza A.P.N.   20 mEq at 17 0800   • ketorolac (TORADOL) injection 30 mg  30 mg Q6HRS PRN Brigid Loaiza, A.P.N.   30 mg at 17 1106   • warfarin (COUMADIN) tablet 7.5 mg  7.5 mg COUMADIN-DAILY Monet Morris, PHARMD   7.5 mg at 17 1952   • pravastatin (PRAVACHOL) tablet 40 mg  40 mg Q EVENING Brigid Loaiza, A.P.N.   40 mg at 17 2100   • levothyroxine (SYNTHROID) tablet 25 mcg  25 mcg AM ES Brigid Loaiza, A.P.N.   25 mcg at 17 0615   • Respiratory Care per Protocol   Continuous RT Brigid Loaiza, A.P.N.       • NS infusion   Continuous Brigid Loaiza, A.P.N. 10 mL/hr at 04/10/17 1732     • docusate sodium (COLACE) capsule 100 mg  100 mg QAM Brigid Loaiza, A.P.N.   100 mg at 17 0800    And   • senna-docusate (PERICOLACE or SENOKOT S) 8.6-50 MG per tablet 1 Tab  1 Tab Nightly Brigid Loaiza, A.P.N.   1 Tab at 17    And   • senna-docusate (PERICOLACE or SENOKOT S) 8.6-50 MG per tablet 1 Tab  1 Tab Q24HRS PRN Brigid PINA  Josse, A.P.N.        And   • lactulose 20 GM/30ML solution 30 mL  30 mL Q24HRS PRN Brigid Loaiza, A.P.N.        And   • bisacodyl (DULCOLAX) suppository 10 mg  10 mg Q24HRS PRN Brigid Loaiza, A.P.N.        And   • fleet enema 133 mL  1 Each Once PRN Brigid Loaiza, A.P.N.       • aspirin EC (ECOTRIN) tablet 81 mg  81 mg DAILY Brigid Loaiza A.P.N.   81 mg at 04/12/17 0800   • MD ALERT... warfarin (COUMADIN) per pharmacy protocol   pharmacy to dose Brigid Loaiza, A.P.N.       • oxycodone immediate-release (ROXICODONE) tablet 5 mg  5 mg Q3HRS PRN Brigid Loaiza A.P.N.   5 mg at 04/13/17 0615   • oxycodone immediate release (ROXICODONE) tablet 10 mg  10 mg Q3HRS PRN Brigid Loaiza, A.P.N.   10 mg at 04/12/17 1630   • tramadol (ULTRAM) 50 MG tablet 50 mg  50 mg Q4HRS PRN Brigid Loaiza, A.P.N.   50 mg at 04/13/17 0049   • ondansetron (ZOFRAN) syringe/vial injection 4 mg  4 mg Q6HRS PRN Brigid Loaiza, A.P.N.   4 mg at 04/11/17 0841    Or   • prochlorperazine (COMPAZINE) injection 10 mg  10 mg Q6HRS PRN Brigid Loaiza, A.P.N.        Or   • promethazine (PHENERGAN) suppository 25 mg  25 mg Q6HRS PRN rBigid Loaiza, A.P.N.       • acetaminophen (TYLENOL) tablet 650 mg  650 mg Q4HRS PRN Brigid Loaiza, A.P.N.        Or   • acetaminophen (TYLENOL) suppository 650 mg  650 mg Q4HRS PRN Brigid Loaiza, A.P.N.       • mag hydrox-al hydrox-simeth (MAALOX PLUS ES or MYLANTA DS) suspension 30 mL  30 mL Q4HRS PRN Brigid Loaiza, A.P.N.   30 mL at 04/11/17 0841   • diphenhydrAMINE (BENADRYL) tablet/capsule 25 mg  25 mg HS PRN - MR X 1 Brigid Loaiza A.P.N.   25 mg at 04/12/17 0312   • dextrose 50% (D50W) injection 25 mL  25 mL PRN Monet Morris, PHARMD         Last reviewed on 4/10/2017 10:02 AM by Georgia Mathew R.N.    Quality  Measures:  Labs reviewed, Medications reviewed and Radiology images reviewed                      Assessment/Plan  -  Severe aortic stenosis, status post elective valve replacement on     04/10/2017.  -  Postoperative mechanical ventilatory support.   - extubated 4/12   - rt/02 protocols   - is/mobilization   - prn lasix   - 2.5L nc  -  Hyponatremia  -  History of hypothyroidism, on replacement therapy.  -  Dyslipidemia, on medication.    Discussed patient condition and risk of morbidity and/or mortality with RN, RT, Therapies and Pharmacy.

## 2017-04-13 NOTE — DISCHARGE PLANNING
Received choice form for Home Health services, referral has been sent to [facility] per patient choice form request.

## 2017-04-13 NOTE — CARE PLAN
Problem: Pain Management  Goal: Pain level will decrease to patient’s comfort goal  Intervention: Follow pain managment plan developed in collaboration with patient and Interdisciplinary Team  Pain was controlled through use of oxy and tramadol. Pt repositioned as needed and comfort measures provided.

## 2017-04-13 NOTE — PROGRESS NOTES
Inpatient Anticoagulation Service Note    Date: 4/13/2017  Reason for Anticoagulation: Bioprosthetic Valve Replacement (AVR)        Hemoglobin Value: 11.1  Hematocrit Value: 32.7  Lab Platelet Value: 94  Target INR: 2.0 to 3.0    INR from last 7 days     Date/Time INR Value    04/13/17 0430 (!)1.23    04/12/17 0549 1.11    04/11/17 0454 1.1    04/10/17 1635 (!)1.32    04/07/17 0918 0.95        Dose from last 7 days     Date/Time Dose (mg)    04/13/17 1300 7.5    04/12/17 0549 7.5    04/11/17 0454 7.5        Significant Interactions: Aspirin, NSAID, Statin, Thyroid Medications (PNR tramadol-receiving)  Bridge Therapy: No    Assessment: No interval changes in overall diet, drug-drug interactions, or clinical status.  H/H/platelets decreased slightly today, no overt s/sx of bleeding.  INR trending up nicely.      Plan:  Continue current regimen with a follow up PT/INR in the AM.    Education Material Provided?: Yes (AET 4/11)    Pharmacist suggested discharge dosing: warfarin 7.5 mg daily with a follow up PT/INR within 48 hours of discharge.       Eva GillisD., BCPS

## 2017-04-13 NOTE — CARE PLAN
Problem: Post op day 2 CABG/Heart Valve Replacement  Goal: Optimal care of the post op CABG/heart valve replacement post op day 2  Intervention: FSBS: when 2 consecutive BS < 130 after post op day 2, discontinue FSBS unless patient is insulin dependent diabetic  done  Intervention: Up in chair for all meals  done  Intervention: Ambulate, increasing the distance each time x 3 and before bed  done  Intervention: Stand at sink and wash up with assistance. Clean incisions twice daily with soap and water.  done  Intervention: IS q 1 hour while awake and record best IS volume  done  Intervention: Consider pacer wire removal by MD  done  Intervention: Consider removal of orozco and chest tube if not already done  Already done

## 2017-04-13 NOTE — CARE PLAN
Problem: Nutritional:  Goal: Achieve adequate nutritional intake  Patient will consume at least 50% of meals when diet advanced from clear liquids.   Outcome: MET Date Met:  04/13/17

## 2017-04-14 VITALS
OXYGEN SATURATION: 87 % | WEIGHT: 281.97 LBS | HEIGHT: 73 IN | HEART RATE: 56 BPM | RESPIRATION RATE: 18 BRPM | DIASTOLIC BLOOD PRESSURE: 60 MMHG | BODY MASS INDEX: 37.37 KG/M2 | TEMPERATURE: 98.4 F | SYSTOLIC BLOOD PRESSURE: 110 MMHG

## 2017-04-14 LAB
ANION GAP SERPL CALC-SCNC: 9 MMOL/L (ref 0–11.9)
BUN SERPL-MCNC: 28 MG/DL (ref 8–22)
CALCIUM SERPL-MCNC: 8.4 MG/DL (ref 8.5–10.5)
CHLORIDE SERPL-SCNC: 96 MMOL/L (ref 96–112)
CO2 SERPL-SCNC: 26 MMOL/L (ref 20–33)
CREAT SERPL-MCNC: 1.01 MG/DL (ref 0.5–1.4)
ERYTHROCYTE [DISTWIDTH] IN BLOOD BY AUTOMATED COUNT: 41.3 FL (ref 35.9–50)
GFR SERPL CREATININE-BSD FRML MDRD: >60 ML/MIN/1.73 M 2
GLUCOSE SERPL-MCNC: 98 MG/DL (ref 65–99)
HCT VFR BLD AUTO: 32.5 % (ref 42–52)
HGB BLD-MCNC: 11 G/DL (ref 14–18)
INR PPP: 1.33 (ref 0.87–1.13)
MCH RBC QN AUTO: 31.4 PG (ref 27–33)
MCHC RBC AUTO-ENTMCNC: 33.8 G/DL (ref 33.7–35.3)
MCV RBC AUTO: 92.9 FL (ref 81.4–97.8)
PLATELET # BLD AUTO: 119 K/UL (ref 164–446)
PMV BLD AUTO: 11 FL (ref 9–12.9)
POTASSIUM SERPL-SCNC: 4.4 MMOL/L (ref 3.6–5.5)
PROTHROMBIN TIME: 16.9 SEC (ref 12–14.6)
RBC # BLD AUTO: 3.5 M/UL (ref 4.7–6.1)
SODIUM SERPL-SCNC: 131 MMOL/L (ref 135–145)
WBC # BLD AUTO: 7.2 K/UL (ref 4.8–10.8)

## 2017-04-14 PROCEDURE — 85027 COMPLETE CBC AUTOMATED: CPT

## 2017-04-14 PROCEDURE — 700111 HCHG RX REV CODE 636 W/ 250 OVERRIDE (IP): Performed by: NURSE PRACTITIONER

## 2017-04-14 PROCEDURE — A9270 NON-COVERED ITEM OR SERVICE: HCPCS | Performed by: NURSE PRACTITIONER

## 2017-04-14 PROCEDURE — 97110 THERAPEUTIC EXERCISES: CPT

## 2017-04-14 PROCEDURE — 700102 HCHG RX REV CODE 250 W/ 637 OVERRIDE(OP)

## 2017-04-14 PROCEDURE — 97530 THERAPEUTIC ACTIVITIES: CPT

## 2017-04-14 PROCEDURE — G8979 MOBILITY GOAL STATUS: HCPCS | Mod: CI

## 2017-04-14 PROCEDURE — G8980 MOBILITY D/C STATUS: HCPCS | Mod: CI

## 2017-04-14 PROCEDURE — A9270 NON-COVERED ITEM OR SERVICE: HCPCS

## 2017-04-14 PROCEDURE — 97535 SELF CARE MNGMENT TRAINING: CPT

## 2017-04-14 PROCEDURE — 700112 HCHG RX REV CODE 229: Performed by: NURSE PRACTITIONER

## 2017-04-14 PROCEDURE — 700102 HCHG RX REV CODE 250 W/ 637 OVERRIDE(OP): Performed by: NURSE PRACTITIONER

## 2017-04-14 PROCEDURE — 85610 PROTHROMBIN TIME: CPT

## 2017-04-14 PROCEDURE — 99233 SBSQ HOSP IP/OBS HIGH 50: CPT | Performed by: INTERNAL MEDICINE

## 2017-04-14 PROCEDURE — 80048 BASIC METABOLIC PNL TOTAL CA: CPT

## 2017-04-14 RX ORDER — WARFARIN SODIUM 7.5 MG/1
7.5 TABLET ORAL
Qty: 30 TAB | Refills: 2
Start: 2017-04-14 | End: 2017-04-14

## 2017-04-14 RX ORDER — AMOXICILLIN 250 MG
1 CAPSULE ORAL DAILY
Qty: 30 TAB | Refills: 0
Start: 2017-04-14 | End: 2017-04-14

## 2017-04-14 RX ORDER — OXYCODONE HYDROCHLORIDE 10 MG/1
10 TABLET ORAL
Qty: 75 TAB | Refills: 0 | Status: SHIPPED | OUTPATIENT
Start: 2017-04-14 | End: 2017-05-25

## 2017-04-14 RX ORDER — FUROSEMIDE 40 MG/1
40 TABLET ORAL DAILY
Qty: 30 TAB | Refills: 0
Start: 2017-04-14 | End: 2017-04-14

## 2017-04-14 RX ORDER — FUROSEMIDE 40 MG/1
40 TABLET ORAL DAILY
Qty: 30 TAB | Refills: 0 | Status: SHIPPED | OUTPATIENT
Start: 2017-04-14 | End: 2017-05-04

## 2017-04-14 RX ORDER — AMOXICILLIN 250 MG
1 CAPSULE ORAL DAILY
Qty: 30 TAB | Refills: 0 | Status: SHIPPED | OUTPATIENT
Start: 2017-04-14 | End: 2017-12-14

## 2017-04-14 RX ORDER — WARFARIN SODIUM 7.5 MG/1
7.5 TABLET ORAL
Qty: 30 TAB | Refills: 2 | Status: SHIPPED | OUTPATIENT
Start: 2017-04-14

## 2017-04-14 RX ORDER — POTASSIUM CHLORIDE 20 MEQ/1
20 TABLET, EXTENDED RELEASE ORAL 2 TIMES DAILY
Qty: 60 TAB | Refills: 11 | Status: SHIPPED | OUTPATIENT
Start: 2017-04-14 | End: 2017-05-04

## 2017-04-14 RX ADMIN — OXYCODONE HYDROCHLORIDE 10 MG: 10 TABLET ORAL at 11:30

## 2017-04-14 RX ADMIN — OXYCODONE HYDROCHLORIDE 10 MG: 10 TABLET ORAL at 03:37

## 2017-04-14 RX ADMIN — TRAMADOL HYDROCHLORIDE 50 MG: 50 TABLET, FILM COATED ORAL at 01:37

## 2017-04-14 RX ADMIN — DOCUSATE SODIUM 100 MG: 100 CAPSULE ORAL at 08:11

## 2017-04-14 RX ADMIN — OXYCODONE HYDROCHLORIDE 10 MG: 10 TABLET ORAL at 16:13

## 2017-04-14 RX ADMIN — OXYCODONE HYDROCHLORIDE 5 MG: 5 TABLET ORAL at 00:32

## 2017-04-14 RX ADMIN — CARVEDILOL 3.12 MG: 3.12 TABLET, FILM COATED ORAL at 08:12

## 2017-04-14 RX ADMIN — POTASSIUM CHLORIDE 20 MEQ: 1500 TABLET, EXTENDED RELEASE ORAL at 08:12

## 2017-04-14 RX ADMIN — LEVOTHYROXINE SODIUM 25 MCG: 50 TABLET ORAL at 06:00

## 2017-04-14 RX ADMIN — LACTULOSE 30 ML: 20 SOLUTION ORAL at 08:11

## 2017-04-14 RX ADMIN — FUROSEMIDE 40 MG: 10 INJECTION, SOLUTION INTRAMUSCULAR; INTRAVENOUS at 06:00

## 2017-04-14 RX ADMIN — LISINOPRIL 5 MG: 5 TABLET ORAL at 08:12

## 2017-04-14 RX ADMIN — WARFARIN SODIUM 7.5 MG: 7.5 TABLET ORAL at 16:12

## 2017-04-14 RX ADMIN — CARVEDILOL 3.12 MG: 3.12 TABLET, FILM COATED ORAL at 16:12

## 2017-04-14 RX ADMIN — ASPIRIN 81 MG: 81 TABLET ORAL at 08:11

## 2017-04-14 RX ADMIN — OXYCODONE HYDROCHLORIDE 10 MG: 10 TABLET ORAL at 07:13

## 2017-04-14 RX ADMIN — BISACODYL 10 MG: 10 SUPPOSITORY RECTAL at 14:02

## 2017-04-14 ASSESSMENT — GAIT ASSESSMENTS
GAIT LEVEL OF ASSIST: SUPERVISED
DEVIATION: BRADYKINETIC
DISTANCE (FEET): 400

## 2017-04-14 ASSESSMENT — ENCOUNTER SYMPTOMS
CARDIOVASCULAR NEGATIVE: 1
EYES NEGATIVE: 1
PSYCHIATRIC NEGATIVE: 1
CONSTITUTIONAL NEGATIVE: 1
RESPIRATORY NEGATIVE: 1
GASTROINTESTINAL NEGATIVE: 1
NEUROLOGICAL NEGATIVE: 1
MUSCULOSKELETAL NEGATIVE: 1

## 2017-04-14 ASSESSMENT — PAIN SCALES - GENERAL
PAINLEVEL_OUTOF10: 3
PAINLEVEL_OUTOF10: 5
PAINLEVEL_OUTOF10: 6
PAINLEVEL_OUTOF10: 4
PAINLEVEL_OUTOF10: 6
PAINLEVEL_OUTOF10: 5
PAINLEVEL_OUTOF10: 6

## 2017-04-14 ASSESSMENT — PATIENT HEALTH QUESTIONNAIRE - PHQ9
SUM OF ALL RESPONSES TO PHQ QUESTIONS 1-9: 0
SUM OF ALL RESPONSES TO PHQ9 QUESTIONS 1 AND 2: 0
1. LITTLE INTEREST OR PLEASURE IN DOING THINGS: NOT AT ALL
2. FEELING DOWN, DEPRESSED, IRRITABLE, OR HOPELESS: NOT AT ALL

## 2017-04-14 ASSESSMENT — LIFESTYLE VARIABLES: EVER_SMOKED: NEVER

## 2017-04-14 NOTE — PROGRESS NOTES
Pulmonary Critical Care Progress Note        Chief Complaint: AVR    History of Present Illness:  57-year-old gentleman with known past    medical history of severe aortic stenosis, hypercholesterolemia, and    hypothyroidism that came in for elective aortic valve replacement.  Patient is   postop at this present time, currently on ventilator and Cleviprex at 2 mg    per hour, Precedex 0.25, insulin at 1 and is slowly waking and following    commands.  Reportedly, uneventful procedure.    ROS:  Respiratory: negative cough and negative shortness of breath, Cardiac: positive chest pain, GI: negative nausea and negative vomiting.  All other systems negative.    Interval Events:  24 hour interval history reviewed   - tmax 97.5   - (-)3.1L over last 24hr, (-)3.7L since admit   - extubated 4/12   - mobilizing well, eating/drinking   - passing gas, no bm yet      PFSH:  No change.    Respiratory:     Pulse Oximetry: 98 %  Chest Tube Drains:          Exam: unlabored respirations, no intercostal retractions or accessory muscle use and diminished breath sounds mild  ImagingAvailable data reviewed   Recent Labs      04/11/17   0750   ISTATAPH  7.414   ISTATAPCO2  35.0   ISTATAPO2  57*   ISTATATCO2  23   [id number]  90*   ISTATARTHCO3  22.4   ISTATARTBE  -2   ISTATTEMP  36.9 C   ISTATFIO2  7   ISTATSPEC  Arterial   ISTATAPHTC  7.415   [id number]  57*       HemoDynamics:  Pulse: (!) 57  Blood Pressure: 110/60 mmHg, NIBP: 130/74 mmHg      Exam: regular rate and rhythm  Imaging: Available data reviewed        Neuro:  GCS Total Ball Coma Score: 15       Exam: no focal deficits noted  Imaging: Available data reviewed    Fluids:  Intake/Output       04/12/17 0700 - 04/13/17 0659 04/13/17 0700 - 04/14/17 0659 04/14/17 0700 - 04/15/17 0659      0333-2319 4296-3887 Total 0479-0100 2951-3224 Total 4687-7398 7558-3728 Total       Intake    P.O.  950  800 1750  700  640 1340  --  -- --    P.O.   -- -- --     I.V.  100  -- 100  --  -- --  --  -- --    IV Piggyback Volume (IV Piggyback) 100 -- 100 -- -- -- -- -- --    Total Intake 8145 350 7645  -- -- --       Output    Urine  1150  1000 2150  2800  1700 4500  --  -- --    Number of Times Voided 6 x 4 x 10 x 8 x 1 x 9 x -- -- --    Indwelling Cathether 1150 -- 1150 -- -- -- -- -- --    Void (ml) -- 1000 1000 2800 1700 4500 -- -- --    Total Output 1150 1000 2150 2800 1700 4500 -- -- --       Net I/O     -100 -200 -300 -2100 -1060 -3160 -- -- --        Weight: (!) 127.9 kg (281 lb 15.5 oz)  Recent Labs      17   0549  17   0430  17   0430   SODIUM  127*  126*  131*   POTASSIUM  4.6  4.4  4.4   CHLORIDE  97  92*  96   CO2  27  28  26   BUN  25*  29*  28*   CREATININE  0.93  1.06  1.01   CALCIUM  7.9*  8.0*  8.4*       GI/Nutrition:  Exam: abdomen is soft and non-tender, normal active bowel sounds  Imaging: Available data reviewed  taking PO  Liver Function  Recent Labs      17   0549  17   0430  17   0430   GLUCOSE  108*  113*  98       Heme:  Recent Labs      17   0549  17   0430  17   0430   RBC  3.78*  3.46*  3.50*   HEMOGLOBIN  12.0*  11.1*  11.0*   HEMATOCRIT  36.0*  32.7*  32.5*   PLATELETCT  119*  94*  119*   PROTHROMBTM  14.7*  15.9*  16.9*   INR  1.11  1.23*  1.33*       Infectious Disease:  Temp  Av.2 °C (97.1 °F)  Min: 35.8 °C (96.5 °F)  Max: 36.4 °C (97.5 °F)  Micro: reviewed  Recent Labs      17   0549  17   0430  17   0430   WBC  13.6*  8.8  7.2     Current Facility-Administered Medications   Medication Dose Frequency Provider Last Rate Last Dose   • furosemide (LASIX) injection 40 mg  40 mg BID DIURETIC Brigid Loaiza, A.P.N.   40 mg at 17 0600   • potassium chloride SA (Kdur) tablet 20 mEq  20 mEq BID Brigid Loaiza, A.P.N.   20 mEq at 17 2130   • MD ALERT...Adult ICU Electrolyte Replacement per Pharmacy Protocol   pharmacy to dose Alec Carey M.D.        • lisinopril (PRINIVIL) tablet 5 mg  5 mg Q DAY Brigid Loaiza, A.P.N.   5 mg at 04/13/17 0737   • alprazolam (XANAX) tablet 0.5 mg  0.5 mg 4X/DAY PRN Brigid Loaiza, A.P.N.       • calcium carbonate (TUMS) chewable tab 500 mg  500 mg 4X/DAY PRN Brigid Loaiza, A.P.N.       • carvedilol (COREG) tablet 3.125 mg  3.125 mg BID WITH MEALS Brigid Loaiza, A.P.N.   3.125 mg at 04/13/17 1707   • ketorolac (TORADOL) injection 30 mg  30 mg Q6HRS PRN Brigid Loaiza, A.P.N.   30 mg at 04/11/17 1106   • warfarin (COUMADIN) tablet 7.5 mg  7.5 mg COUMADIN-DAILY Monet Morris PHARMD   7.5 mg at 04/13/17 1707   • pravastatin (PRAVACHOL) tablet 40 mg  40 mg Q EVENING Brigid Loaiza, A.P.N.   40 mg at 04/13/17 2131   • levothyroxine (SYNTHROID) tablet 25 mcg  25 mcg AM ES Brigid Loaiza, A.P.N.   25 mcg at 04/14/17 0600   • Respiratory Care per Protocol   Continuous RT Brigid Loaiza, A.P.N.       • NS infusion   Continuous Brigid Loaiza A.P.N. 10 mL/hr at 04/10/17 1732     • docusate sodium (COLACE) capsule 100 mg  100 mg QAM Brigid Loaiza, A.P.N.   100 mg at 04/13/17 0736    And   • senna-docusate (PERICOLACE or SENOKOT S) 8.6-50 MG per tablet 1 Tab  1 Tab Nightly Brigid Loaiza, A.P.N.   1 Tab at 04/13/17 2130    And   • senna-docusate (PERICOLACE or SENOKOT S) 8.6-50 MG per tablet 1 Tab  1 Tab Q24HRS PRN Brigid Loaiza, A.P.N.        And   • lactulose 20 GM/30ML solution 30 mL  30 mL Q24HRS PRN GURJIT Ramos.P.N.        And   • bisacodyl (DULCOLAX) suppository 10 mg  10 mg Q24HRS PRN Brigid Loaiza A.P.N.        And   • fleet enema 133 mL  1 Each Once PRN Brigid Loaiza A.P.N.       • aspirin EC (ECOTRIN) tablet 81 mg  81 mg DAILY GURJIT Ramos.P.N.   Stopped at 04/13/17 0900   • MD ALERT... warfarin (COUMADIN) per pharmacy protocol   pharmacy to dose Brigid Loaiza A.P.N.       • oxycodone immediate-release (ROXICODONE) tablet 5 mg  5 mg Q3HRS PRN Brigid Loaiza A.P.N.   5 mg at 04/14/17 0032   • oxycodone  immediate release (ROXICODONE) tablet 10 mg  10 mg Q3HRS PRN Brigid Loaiza, A.P.N.   10 mg at 04/14/17 0713   • tramadol (ULTRAM) 50 MG tablet 50 mg  50 mg Q4HRS PRN Brigid Loaiza, A.P.N.   50 mg at 04/14/17 0137   • ondansetron (ZOFRAN) syringe/vial injection 4 mg  4 mg Q6HRS PRN Brigid Loaiza, A.P.N.   4 mg at 04/11/17 0841    Or   • prochlorperazine (COMPAZINE) injection 10 mg  10 mg Q6HRS PRN Brigid Loaiza, A.P.N.        Or   • promethazine (PHENERGAN) suppository 25 mg  25 mg Q6HRS PRN Brigid Loaiza, A.P.N.       • acetaminophen (TYLENOL) tablet 650 mg  650 mg Q4HRS PRN Brigid Loaiza, A.P.N.        Or   • acetaminophen (TYLENOL) suppository 650 mg  650 mg Q4HRS PRN Brigid Loaiza, A.P.N.       • mag hydrox-al hydrox-simeth (MAALOX PLUS ES or MYLANTA DS) suspension 30 mL  30 mL Q4HRS PRN Brigid Loaiza, A.P.N.   30 mL at 04/11/17 0841   • diphenhydrAMINE (BENADRYL) tablet/capsule 25 mg  25 mg HS PRN - MR X 1 Brigid Loaiza, A.P.N.   25 mg at 04/12/17 0312   • dextrose 50% (D50W) injection 25 mL  25 mL PRN Monet Morris, PHARMD         Last reviewed on 4/10/2017 10:02 AM by Georgia Mathew R.N.    Quality  Measures:  Labs reviewed, Medications reviewed and Radiology images reviewed                      Assessment/Plan  -  Severe aortic stenosis, status post elective valve replacement on    04/10/2017.  -  Postoperative mechanical ventilatory support.   - extubated 4/12 - rt/02 protocols   - is/mobilization   - prn lasix  -  Hyponatremia  -  History of hypothyroidism, on replacement therapy.  -  Dyslipidemia, on medication.    Discussed patient condition and risk of morbidity and/or mortality with RN, RT, Therapies and Pharmacy.

## 2017-04-14 NOTE — THERAPY
"Physical Therapy Treatment completed.   Bed Mobility:  Supine to Sit:  (NT, sleeping in recliner upon return home )  Transfers: Sit to Stand: Supervised  Gait: Level Of Assist: Supervised with No Equipment Needed       Plan of Care: no further acute PT needs at this time   Discharge Recommendations: Equipment: No Equipment Needed unless identified by OT   Pt with improving dynamic balance and activity tolerance. Does demonstrate hypoxia on RA, 84% at rest/activity; placed on 2L for 91% at rest, 87% at lowest during activity on 2L, avg 89%. Pt feels functionally capable of d/c home when medically stable to do so. No further acute PT needs at this time, please refer to outpt cardiac rehab when appropriate in stage of recovery.   See \"Rehab Therapy-Acute\" Patient Summary Report for complete documentation.       "

## 2017-04-14 NOTE — DISCHARGE PLANNING
Received a call from [facility], spoke to Amrit. Patient has been accepted for DME/O2 services. Supplies will be delivered to patient in the hospital soon. ALEK Odom has been notified via voicemail.

## 2017-04-14 NOTE — PROGRESS NOTES
Cardiovascular Surgery Progress Note    Name: Gagandeep Durbin Jr.  MRN: [id number]  : 1960  Admit Date: 4/10/2017  9:26 AM  Procedure:  Procedure(s) and Anesthesia Type:     * AORTIC VALVE REPLACEMENT - General     * KIARRA - General  4 Day Post-Op    Vitals:  Patient Vitals for the past 8 hrs:   Temp SpO2 O2 Delivery O2 (LPM) Pulse Resp NIBP Weight   17 0500 - 98 % Oxymask 2 - - - (!) 127.9 kg (281 lb 15.5 oz)   17 0400 35.8 °C (96.5 °F) 90 % Silicone Nasal Cannula 4 (!) 57 18 130/74 mmHg -   17 0340 - 94 % Silicone Nasal Cannula 4 - - - -   17 0335 - (!) 83 % Silicone Nasal Cannula 2 - - - -     Temp (24hrs), Av.1 °C (97 °F), Min:35.8 °C (96.5 °F), Max:36.4 °C (97.5 °F)      Respiratory:    Respiration: 18, Pulse Oximetry: 98 %, O2 Daily Delivery Respiratory : Silicone Nasal Cannula     Chest Tube Drains:          Fluids:    Intake/Output Summary (Last 24 hours) at 17 0859  Last data filed at 17 0600   Gross per 24 hour   Intake   1190 ml   Output   4250 ml   Net  -3060 ml     Admit weight: Weight: 122.471 kg (270 lb)  Current weight: Weight: (!) 127.9 kg (281 lb 15.5 oz) (17 0500)    Labs:  Recent Labs      17   0549  17   0430  17   0430   WBC  13.6*  8.8  7.2   RBC  3.78*  3.46*  3.50*   HEMOGLOBIN  12.0*  11.1*  11.0*   HEMATOCRIT  36.0*  32.7*  32.5*   MCV  95.2  94.5  92.9   MCH  31.7  32.1  31.4   MCHC  33.3*  33.9  33.8   RDW  44.4  42.9  41.3   PLATELETCT  119*  94*  119*   MPV  10.6  10.3  11.0         Recent Labs      17   0549  17   0430  17   0430   SODIUM  127*  126*  131*   POTASSIUM  4.6  4.4  4.4   CHLORIDE  97  92*  96   CO2  27  28  26   GLUCOSE  108*  113*  98   BUN  25*  29*  28*   CREATININE  0.93  1.06  1.01   CALCIUM  7.9*  8.0*  8.4*     Recent Labs      17   0549  17   0430  17   0430   INR  1.11  1.23*  1.33*       Medications:  • furosemide  40 mg     • potassium chloride SA  20 mEq     • MD  ALERT...Adult ICU Electrolyte Replacement per Pharmacy Protocol       • lisinopril  5 mg     • carvedilol  3.125 mg     • warfarin  7.5 mg     • pravastatin  40 mg     • levothyroxine  25 mcg     • docusate sodium  100 mg      And   • senna-docusate  1 Tab     • aspirin EC  81 mg     • MD ALERT... warfarin           Exam:   Review of Systems   Constitutional: Negative.    HENT: Negative.    Eyes: Negative.    Respiratory: Negative.    Cardiovascular: Negative.    Gastrointestinal: Negative.    Genitourinary: Negative.    Musculoskeletal: Negative.    Skin: Negative.    Neurological: Negative.    Endo/Heme/Allergies: Negative.    Psychiatric/Behavioral: Negative.        Physical Exam   Constitutional: He is oriented to person, place, and time. He appears well-developed and well-nourished. No distress.   Neck: Neck supple.   Cardiovascular: Normal rate, regular rhythm and normal heart sounds.  Exam reveals no gallop and no friction rub.    No murmur heard.  Pulmonary/Chest: Effort normal. No respiratory distress. He has decreased breath sounds in the right lower field and the left lower field. He has no wheezes. He has no rales.   Abdominal: Soft. He exhibits no distension. There is no tenderness.   Neurological: He is alert and oriented to person, place, and time.   Skin: Skin is warm and dry. He is not diaphoretic.       Quality Measures:     Orozco catheter: One or Two Days Post Surgery (Day of Surgery being Day 0)  Central line in place: Need for access    DVT Prophylaxis: Warfarin (Coumadin)  DVT prophylaxis - mechanical: Not indicated at this time, ambulatory  Ulcer prophylaxis: Yes    Assessed for rehab: Patient returned to prior level of function, rehabilitation not indicated at this time      Assessment/Plan:  POD 1 - HTN- on cleviprex gtt- restart po meds, diurese, dc swan/mediastinal tubes/orozco, add toradol for pain, amb, enc IS  POD 2 HDS, cont diuresis, dc wires, pain controlled, amb, enc IS  POD 3 HDS,  SR, inc diuresis, amb, enc IS  POD 4 HDS, SR, neuro intact, wounds CDI.  Home today.  No oxygen needed.     Patient seen, examined and plan reviewed with midlevel provider. I agree with the plan.      Active Hospital Problems    Diagnosis   • Aortic stenosis [I35.0]     Priority: Medium     ECHO -Aortic Valve  Aortic annular calcification. Highly calcified aortic valve leaflets.   Mild aortic insufficiency. Mild aortic stenosis. Transvalvular   gradients are - Peak: 33 mmHg Mean: 20 mmHg. Dimensionless index is   20/64=0.31.

## 2017-04-14 NOTE — DISCHARGE PLANNING
02 order received.  Choice form completed for [facility].  Faxed to [facility].  Requesting to be expedited.   Patient accepted to [facility].

## 2017-04-14 NOTE — CARE PLAN
Problem: Post Op Day 3 CABG/Heart Valve replacement  Goal: Optimal care of the post op CABG/Heart Valve replacement post op day 3  Intervention: Shower daily and clean incisions twice daily with soap and water  done  Intervention: Up in chair for all meals  done  Intervention: Ambulate, increasing the distance each time x 3 and before bed  done  Intervention: IS q 1 hour while awake and record best IS volume  done  Intervention: Consider removal of orozco, chest tube and pacer wire if not already done  Already done

## 2017-04-14 NOTE — DISCHARGE PLANNING
Received choice form for DME services, referral has been sent to [facility] per patient and choice form request.

## 2017-04-14 NOTE — PROGRESS NOTES
Received report and assumed care of patient. Assessment complete, VSS, no complaints at this time. Pt ambulated in peck and up to bathroom. Discussed plan of care with patient and answered all questions. Fall precautions in place and call light in reach.

## 2017-04-14 NOTE — PROGRESS NOTES
Inpatient Anticoagulation Service Note    Date: 4/14/2017  Reason for Anticoagulation: Bioprosthetic Valve Replacement (AVR)        Hemoglobin Value: 11  Hematocrit Value: 32.5  Lab Platelet Value: 119  Target INR: 2.0 to 3.0    INR from last 7 days     Date/Time INR Value    04/14/17 0430 (!)1.33    04/13/17 0430 (!)1.23    04/12/17 0549 1.11    04/11/17 0454 1.1    04/10/17 1635 (!)1.32        Dose from last 7 days     Date/Time Dose (mg)    04/14/17 1200 7.5    04/13/17 1300 7.5    04/12/17 0549 7.5    04/11/17 0454 7.5        Significant Interactions: Aspirin, NSAID, Statin, Thyroid Medications (PNR tramadol-receiving)  Bridge Therapy: No    Comments: No interval changes in overall clinical status, diet, or drug-drug interactions.  H/H is stable.  INR beginning to move toward the therapeutic range.  Orders to DC today.      Plan:  Continue warfarin 7.5 mg daily.  Education Material Provided?: Yes (AET 4/11)  Pharmacist suggested discharge dosing: Would continue warfarin 7.5 mg daily with a recommended follow up appointment for PT/INR within 48 hours of discharge.       Lissa Hernandez, Bronson.D., BCPS

## 2017-04-14 NOTE — CARE PLAN
Problem: Post Op Day 3 CABG/Heart Valve replacement  Goal: Optimal care of the post op CABG/Heart Valve replacement post op day 3  Intervention: Daily Weights  Done      Intervention: Up in chair for all meals  Up for breakfast  Intervention: Ambulate, increasing the distance each time x 3 and before bed  Ambulated before bed and before breakfast  Intervention: IS q 1 hour while awake and record best IS volume  Pt up to 2500 on IS

## 2017-04-19 ENCOUNTER — TELEPHONE (OUTPATIENT)
Dept: MEDICAL GROUP | Facility: PHYSICIAN GROUP | Age: 57
End: 2017-04-19

## 2017-04-19 DIAGNOSIS — Z79.01 CHRONIC ANTICOAGULATION: ICD-10-CM

## 2017-04-19 NOTE — TELEPHONE ENCOUNTER
At discharge they wanted it within 48 hours and then it looks like once a week.  He is on 7.5 mg daily.  Please let IMELDA FLANNERY know.  Roopa Butler

## 2017-04-19 NOTE — TELEPHONE ENCOUNTER
VOICEMAIL  1. Caller Name: jose stevenson/ [facility]                      Call Back Number: [phone]    2. Message: Jose informed that pain meds causing hallucination, dosage cut in half pt reported to be doing better.   Jose inquiring as to the frequency of INR.     3. Patient approves office to leave a detailed voicemail/MyChart message: yes

## 2017-04-20 ENCOUNTER — ANTICOAGULATION MONITORING (OUTPATIENT)
Dept: VASCULAR LAB | Facility: MEDICAL CENTER | Age: 57
End: 2017-04-20

## 2017-04-20 DIAGNOSIS — Z95.3 HISTORY OF HEART VALVE REPLACEMENT WITH BIOPROSTHETIC VALVE: ICD-10-CM

## 2017-04-20 PROBLEM — Z79.01 CHRONIC ANTICOAGULATION: Status: ACTIVE | Noted: 2017-04-20

## 2017-04-20 LAB — INR PPP: 2.6 (ref 2–3.5)

## 2017-04-20 NOTE — TELEPHONE ENCOUNTER
Spoke with Jose Mcfarlane,  Will do INR today and send to Anticoagulation. Informed 7.5 mg and will do weekly on Thursday until further notice.  Needing order for INR weekly please. Fax to [facility] [phone]

## 2017-04-20 NOTE — PROGRESS NOTES
Anticoagulation Summary as of 4/20/2017     INR goal 2.0-3.0   Selected INR 2.6 (4/20/2017)   Maintenance plan 7.5 mg (7.5 mg x 1) every day   Weekly total 52.5 mg   Plan last modified Amrit Rosario PHARMD (4/20/2017)   Next INR check 4/25/2017   Target end date 7/10/2017    Indications   History of heart valve replacement with bioprosthetic valve [Z95.4] [Z95.4]         Anticoagulation Episode Summary     INR check location Home Draw    Preferred lab     Send INR reminders to     Comments [facility]      Anticoagulation Care Providers     Provider Role Specialty Phone number    Amrit Rosario PHARMD Responsible          Anticoagulation Patient Findings    New referral for anticoagulation management. Recent AVR with a bioprosthetic valve. Has been on 7.5 mg daily since discharge. No current bleeding concerns. Currently being seen by [facility]. Continue current dose of warfarin. Follow up INR on Tuesday.    Amrit Rosario, FARZANAD

## 2017-04-24 NOTE — DISCHARGE SUMMARY
CHIEF COMPLAINT ON ADMISSION  Chest Pain    CODE STATUS  Prior     PREOP DX:  Severe aortic stenosis (calcific or degenerative),    bicuspid aortic valve, hypertension, obesity, hypothyroidism, dyslipidemia.    DISCHARGE DX:  Severe aortic stenosis (calcific or degenerative),    bicuspid aortic valve, hypertension, obesity, hypothyroidism, dyslipidemia.    PROCEDURES  Aortic valve replacement (29 mm Forbes Perimount Magna    pericardial valve), and intraoperative transesophageal echocardiography.    HPI & HOSPITAL COURSE  This is a 57 y.o. year old male here for elective Aortic valve replacement.     He has been having worsening shortness  of breath and substernal chest pressure.    Echocardiography showed severe calcific aortic stenosis.  Cardiac catheterization did not show any    hemodynamically significant coronary artery disease.    POD 1 - HTN- on cleviprex gtt- restart po meds, diurese, dc swan/mediastinal tubes/orozco, add toradol for pain, amb, enc IS  POD 2 HDS, cont diuresis, dc pacing wires, pain controlled, amb, enc IS  POD 3 HDS, SR, inc diuresis, amb, enc IS  POD 4 HDS, SR, neuro intact, wounds CDI. Abdomen soft and non tender with positive bowel sounds.  Home today.  No oxygen needed.     Therefore, he is discharged in good and stable condition with close outpatient follow-up.    SPECIFIC OUTPATIENT FOLLOW-UP  INR draws.     DISCHARGE PROBLEM LIST  Active Problems:    * No active hospital problems. *  Resolved Problems:    Aortic stenosis POA: Yes      Overview: ECHO -Aortic Valve      Aortic annular calcification. Highly calcified aortic valve leaflets.       Mild aortic insufficiency. Mild aortic stenosis. Transvalvular       gradients are - Peak: 33 mmHg Mean: 20 mmHg. Dimensionless index is       20/64=0.31.                  FOLLOW UP  Future Appointments  Date Time Provider Department Center   4/28/2017 7:00 AM [facility] None   4/28/2017 8:30 AM [facility] EXAM 4 [facility] None   4/28/2017 9:20 AM  Steve Ruelas M.D. [facility] None   5/17/2017 9:20 AM Eric Mann M.D. ASHOK Carter M.D.  [id number]  [address]  [facility] [address]  [phone]    On 5/8/2017  1:00 pm    [facility]  [address] [facility] [phone]        Roopa Butler, A.P.NLily  [address] [place] [id number] [phone]            MEDICATIONS ON DISCHARGE   Gagandeep Durbin Jr.   Home Medication Instructions ANTHONY:[id number]    Printed on:04/24/17 1234   Medication Information                      aspirin EC (ECOTRIN) 81 MG Tablet Delayed Response  Take 81 mg by mouth every day.             Aug Betamethasone Dipropionate (DIPROLENE-AF) 0.05 % Cream  Apply 1 g to affected area(s) 2 times a day.             carvedilol (COREG) 3.125 MG Tab  Take 1 Tab by mouth 2 times a day, with meals.             Cholecalciferol (VITAMIN D3) 2000 UNITS Tab  Take 2,000 Units by mouth every day.             furosemide (LASIX) 40 MG Tab  Take 1 Tab by mouth every day.             levothyroxine (SYNTHROID) 25 MCG Tab  Take 1 Tab by mouth Every morning on an empty stomach.             lisinopril (PRINIVIL) 5 MG Tab  Take 1 Tab by mouth every day.             morphine (MS IR) 15 MG tablet  Take 1 Tab by mouth every four hours as needed for Severe Pain.             oxycodone immediate release (ROXICODONE) 10 MG immediate release tablet  Take 1 Tab by mouth every 3 hours as needed (Severe Pain (NRS Pain Scale 7-10; CPOT Pain Scale 6-8)).             potassium chloride SA (KDUR) 20 MEQ Tab CR  Take 1 Tab by mouth 2 times a day.             pravastatin (PRAVACHOL) 20 MG Tab  TAKE TWO TABLETS BY MOUTH ONCE DAILY             senna-docusate (PERICOLACE OR SENOKOT S) 8.6-50 MG Tab  Take 1 Tab by mouth every day.             warfarin (COUMADIN) 7.5 MG Tab  Take 1 Tab by mouth COUMADIN-DAILY.              Aware checked.     DIET  Cardiac diet.    ACTIVITY  As tolerated.  Shower only.  No driving 1 month.  No heavy lifting  heavier than 10 lbs.  No raising arms above head.    CONSULTATIONS  None    LABORATORY  Lab Results   Component Value Date/Time    SODIUM 131* 04/14/2017 04:30 AM    POTASSIUM 4.4 04/14/2017 04:30 AM    CHLORIDE 96 04/14/2017 04:30 AM    CO2 26 04/14/2017 04:30 AM    GLUCOSE 98 04/14/2017 04:30 AM    BUN 28* 04/14/2017 04:30 AM    CREATININE 1.01 04/14/2017 04:30 AM        Lab Results   Component Value Date/Time    WBC 7.2 04/14/2017 04:30 AM    HEMOGLOBIN 11.0* 04/14/2017 04:30 AM    HEMATOCRIT 32.5* 04/14/2017 04:30 AM    PLATELET COUNT 119* 04/14/2017 04:30 AM        Total time of the discharge process exceeds 31 minutes.

## 2017-04-28 ENCOUNTER — OFFICE VISIT (OUTPATIENT)
Dept: CARDIOLOGY | Facility: MEDICAL CENTER | Age: 57
End: 2017-04-28
Payer: COMMERCIAL

## 2017-04-28 VITALS
SYSTOLIC BLOOD PRESSURE: 116 MMHG | HEART RATE: 68 BPM | HEIGHT: 73 IN | OXYGEN SATURATION: 96 % | BODY MASS INDEX: 35.39 KG/M2 | WEIGHT: 267 LBS | DIASTOLIC BLOOD PRESSURE: 64 MMHG

## 2017-04-28 DIAGNOSIS — E78.5 DYSLIPIDEMIA: ICD-10-CM

## 2017-04-28 DIAGNOSIS — I10 ESSENTIAL HYPERTENSION, BENIGN: ICD-10-CM

## 2017-04-28 DIAGNOSIS — Z95.2 S/P AVR (AORTIC VALVE REPLACEMENT): ICD-10-CM

## 2017-04-28 DIAGNOSIS — I50.42 CHRONIC COMBINED SYSTOLIC AND DIASTOLIC CHF (CONGESTIVE HEART FAILURE) (HCC): ICD-10-CM

## 2017-04-28 PROBLEM — I35.0 SEVERE AORTIC STENOSIS: Status: RESOLVED | Noted: 2017-03-21 | Resolved: 2017-04-28

## 2017-04-28 PROCEDURE — 99214 OFFICE O/P EST MOD 30 MIN: CPT | Performed by: INTERNAL MEDICINE

## 2017-04-28 ASSESSMENT — ENCOUNTER SYMPTOMS
INSOMNIA: 0
PALPITATIONS: 0
MYALGIAS: 0
DIZZINESS: 0
ORTHOPNEA: 0
PND: 0
CHILLS: 0
BLURRED VISION: 0
FEVER: 0
LOSS OF CONSCIOUSNESS: 0
SHORTNESS OF BREATH: 1
ABDOMINAL PAIN: 0

## 2017-04-28 NOTE — MR AVS SNAPSHOT
"        Gagandeep Funesluis Cheema   2017 9:20 AM   Office Visit   MRN: [id number]    Department:  [facility]   Dept Phone:  [phone]    Description:  Male : 1960   Provider:  Steve Ruelas M.D.           Reason for Visit     Follow-Up           Allergies as of 2017     Allergen Noted Reactions    Other Misc 04/10/2017   Rash    metals  Blisters on contact site       You were diagnosed with     S/P AVR (aortic valve replacement)   [864711]       Chronic combined systolic and diastolic CHF (congestive heart failure) (CMS-HCC)   [099219]       Essential hypertension, benign   [401.1.ICD-9-CM]       Dyslipidemia   [215275]         Vital Signs     Blood Pressure Pulse Height Weight Body Mass Index Oxygen Saturation    116/64 mmHg 68 1.854 m (6' 0.99\") 121.11 kg (267 lb) 35.23 kg/m2 96%    Smoking Status                   Former Smoker           Basic Information     Date Of Birth Sex Race Ethnicity Preferred Language    1960 Male White Non- English      Your appointments     May 17, 2017  9:20 AM   NEW TO YOU with Eric Mann M.D.   [facility] [place] [facility]    [address] [place] [id number] [phone]              Problem List              ICD-10-CM Priority Class Noted - Resolved    BMI 37.0-37.9, adult Z68.37   10/14/2014 - Present    Accessory skin tags Q82.8   10/15/2014 - Present    Vitamin D deficiency E55.9   10/21/2014 - Present    Abnormal EKG R94.31   2014 - Present    Hypothyroid E03.9 Low  2016 - Present    Need for Tdap vaccination Z23   2/10/2017 - Present    Alcoholism (CMS-HCC) F10.20   2017 - Present    Hypothyroidism E03.9   2017 - Present    Arthritis M19.90   2017 - Present    Chronic sinus bradycardia R00.1   2017 - Present    NSTEMI (non-ST elevated myocardial infarction) (CMS-HCC) I21.4   2017 - Present    Bradycardia R00.1 Medium  3/17/2017 - Present    Non-sustained ventricular tachycardia " (CMS-HCC) I47.2 High  3/17/2017 - Present    Aortic stenosis due to bicuspid aortic valve Q23.1, I35.0 High  3/17/2017 - Present    Cardiomyopathy (CMS-HCC) I42.9 Medium  3/20/2017 - Present    Acute on chronic heart failure (CMS-HCC) I50.9   3/20/2017 - Present    PVC (premature ventricular contraction) I49.3 Medium  3/20/2017 - Present    Chest pain, rule out acute myocardial infarction R07.9 High  3/20/2017 - Present    Chronic combined systolic and diastolic CHF (congestive heart failure) (CMS-HCC) I50.42 Medium  3/21/2017 - Present    Renal insufficiency N28.9 Medium  3/21/2017 - Present    Obesity (BMI 30-39.9) E66.9   3/28/2017 - Present    Chronic anticoagulation Z79.01   4/20/2017 - Present    History of heart valve replacement with bioprosthetic valve [Z95.4] Z95.4   4/20/2017 - Present    S/P AVR (aortic valve replacement) Z95.2   4/28/2017 - Present    Essential hypertension, benign I10   4/28/2017 - Present    Dyslipidemia E78.5   4/28/2017 - Present      Health Maintenance        Date Due Completion Dates    IMM PNEUMOCOCCAL 19-64 (ADULT) MEDIUM RISK SERIES (1 of 1 - PPSV23) 3/25/1977 ---    COLONOSCOPY 10/1/2021 10/1/2016 (N/S), 3/1/2013 (N/S)    Override on 10/1/2016: (N/S)    Override on 3/1/2013: (N/S)    IMM DTaP/Tdap/Td Vaccine (2 - Td) 2/10/2027 2/10/2017            Current Immunizations     Influenza Vaccine Quad Inj (Pf) 10/15/2016    Tdap Vaccine 2/10/2017  8:30 AM      Below and/or attached are the medications your provider expects you to take. Review all of your home medications and newly ordered medications with your provider and/or pharmacist. Follow medication instructions as directed by your provider and/or pharmacist. Please keep your medication list with you and share with your provider. Update the information when medications are discontinued, doses are changed, or new medications (including over-the-counter products) are added; and carry medication information at all times in the  event of emergency situations     Allergies:  OTHER MISC - Rash               Medications  Valid as of: April 28, 2017 -  9:21 AM    Generic Name Brand Name Tablet Size Instructions for use    Aspirin (Tablet Delayed Response) ECOTRIN 81 MG Take 81 mg by mouth every day.        Betamethasone Dipropionate Aug (Cream) DIPROLENE-AF 0.05 % Apply 1 g to affected area(s) 2 times a day.        Carvedilol (Tab) COREG 3.125 MG Take 1 Tab by mouth 2 times a day, with meals.        Cholecalciferol (Tab) Vitamin D3 2000 UNITS Take 2,000 Units by mouth every day.        Furosemide (Tab) LASIX 40 MG Take 1 Tab by mouth every day.        Levothyroxine Sodium (Tab) SYNTHROID 25 MCG Take 1 Tab by mouth Every morning on an empty stomach.        Lisinopril (Tab) PRINIVIL 5 MG Take 1 Tab by mouth every day.        Morphine Sulfate (Tab) MS IR 15 MG Take 1 Tab by mouth every four hours as needed for Severe Pain.        OxyCODONE HCl (Tab) ROXICODONE 10 MG Take 1 Tab by mouth every 3 hours as needed (Severe Pain (NRS Pain Scale 7-10; CPOT Pain Scale 6-8)).        Potassium Chloride Marlin CR (Tab CR) Kdur 20 MEQ Take 1 Tab by mouth 2 times a day.        Pravastatin Sodium (Tab) PRAVACHOL 20 MG TAKE TWO TABLETS BY MOUTH ONCE DAILY        Sennosides-Docusate Sodium (Tab) PERICOLACE or SENOKOT S 8.6-50 MG Take 1 Tab by mouth every day.        Warfarin Sodium (Tab) COUMADIN 7.5 MG Take 1 Tab by mouth COUMADIN-DAILY.        .                 Medicines prescribed today were sent to:     [facility] [place] [place] - [address] [address] [facility] [address]    Phone: [phone] Fax: [phone]    Open 24 Hours?: No      Medication refill instructions:       If your prescription bottle indicates you have medication refills left, it is not necessary to call your provider’s office. Please contact your pharmacy and they will refill your medication.    If your prescription bottle indicates you do not have any  refills left, you may request refills at any time through one of the following ways: The online [employer] system (except Urgent Care), by calling your provider’s office, or by asking your pharmacy to contact your provider’s office with a refill request. Medication refills are processed only during regular business hours and may not be available until the next business day. Your provider may request additional information or to have a follow-up visit with you prior to refilling your medication.   *Please Note: Medication refills are assigned a new Rx number when refilled electronically. Your pharmacy may indicate that no refills were authorized even though a new prescription for the same medication is available at the pharmacy. Please request the medicine by name with the pharmacy before contacting your provider for a refill.        Your To Do List     Future Labs/Procedures Complete By Expires    ECHOCARDIOGRAM COMP W/O CONT  As directed 4/29/2018         [employer] Status: Patient Declined

## 2017-05-01 ENCOUNTER — ANTICOAGULATION MONITORING (OUTPATIENT)
Dept: VASCULAR LAB | Facility: MEDICAL CENTER | Age: 57
End: 2017-05-01

## 2017-05-01 DIAGNOSIS — Z95.3 HISTORY OF HEART VALVE REPLACEMENT WITH BIOPROSTHETIC VALVE: ICD-10-CM

## 2017-05-01 LAB — INR PPP: 2.9 (ref 2–3.5)

## 2017-05-01 NOTE — PROGRESS NOTES
Anticoagulation Summary as of 5/1/2017     INR goal 2.0-3.0   Selected INR 2.9 (4/27/2017)   Maintenance plan 7.5 mg (7.5 mg x 1) every day   Weekly total 52.5 mg   Plan last modified Amrit Rosario PHARMELIJAH (4/20/2017)   Next INR check 5/11/2017   Target end date 7/10/2017    Indications   History of heart valve replacement with bioprosthetic valve [Z95.4] [Z95.4]         Anticoagulation Episode Summary     INR check location Home Draw    Preferred lab     Send INR reminders to     [facility]      Anticoagulation Care Providers     Provider Role Specialty Phone number    Amrit Rosario PHARMD Responsible          Anticoagulation Patient Findings    Patient's INR was therapeutic.   Denies any unusual s/s of bleeding, bruising, clotting.  Denies any changes to:   Diet   Medications  Confirmed dosing regimen.    Pt is to continue with current warfarin dosing regimen.    Follow up in 2 week(s)    Terrence Zelaya, FARZANAD

## 2017-05-04 ENCOUNTER — ANTICOAGULATION MONITORING (OUTPATIENT)
Dept: VASCULAR LAB | Facility: MEDICAL CENTER | Age: 57
End: 2017-05-04

## 2017-05-04 DIAGNOSIS — Z95.3 HISTORY OF HEART VALVE REPLACEMENT WITH BIOPROSTHETIC VALVE: ICD-10-CM

## 2017-05-04 LAB — INR PPP: 3.6 (ref 2–3.5)

## 2017-05-04 RX ORDER — PRAVASTATIN SODIUM 20 MG
TABLET ORAL
Qty: 180 TAB | Refills: 0 | Status: SHIPPED | OUTPATIENT
Start: 2017-05-04 | End: 2017-05-25

## 2017-05-04 NOTE — TELEPHONE ENCOUNTER
Was the patient seen in the last year in this department? Yes     Does patient have an active prescription for medications requested? No     Received Request Via: Pharmacy      Pt met protocol?: Yes, OV 3/17, CBC done 4/17

## 2017-05-04 NOTE — PROGRESS NOTES
Anticoagulation Summary as of 5/4/2017     INR goal 2.0-3.0   Selected INR 3.6! (5/4/2017)   Maintenance plan 7.5 mg (7.5 mg x 1) every day   Weekly total 52.5 mg   Plan last modified Amrit Rosario PHARMD (4/20/2017)   Next INR check 5/11/2017   Target end date 7/10/2017    Indications   History of heart valve replacement with bioprosthetic valve [Z95.4] [Z95.4]         Anticoagulation Episode Summary     INR check location Home Draw    Preferred lab     Send INR reminders to     [facility]      Anticoagulation Care Providers     Provider Role Specialty Phone number    FARZANA CanoD Responsible          Anticoagulation Patient Findings    Spoke to patient on phone. No current signs of bleeding. Lasix and potassium discontinued. No other interval medication changes. HOLD warfarin x 1 dose. Follow up INR in 1 week.    Amrit Rosario, PHARMD

## 2017-05-05 ENCOUNTER — TELEPHONE (OUTPATIENT)
Dept: VASCULAR LAB | Facility: MEDICAL CENTER | Age: 57
End: 2017-05-05

## 2017-05-05 ENCOUNTER — OFFICE VISIT (OUTPATIENT)
Dept: URGENT CARE | Facility: PHYSICIAN GROUP | Age: 57
End: 2017-05-05
Payer: COMMERCIAL

## 2017-05-05 ENCOUNTER — TELEPHONE (OUTPATIENT)
Dept: URGENT CARE | Facility: PHYSICIAN GROUP | Age: 57
End: 2017-05-05

## 2017-05-05 VITALS
SYSTOLIC BLOOD PRESSURE: 140 MMHG | RESPIRATION RATE: 14 BRPM | BODY MASS INDEX: 35.76 KG/M2 | TEMPERATURE: 97 F | WEIGHT: 271 LBS | OXYGEN SATURATION: 96 % | HEART RATE: 92 BPM | DIASTOLIC BLOOD PRESSURE: 90 MMHG

## 2017-05-05 DIAGNOSIS — M10.9 ACUTE GOUT OF RIGHT FOOT, UNSPECIFIED CAUSE: ICD-10-CM

## 2017-05-05 PROCEDURE — 99214 OFFICE O/P EST MOD 30 MIN: CPT | Performed by: PHYSICIAN ASSISTANT

## 2017-05-05 RX ORDER — COLCHICINE 0.6 MG/1
TABLET ORAL
Qty: 10 TAB | Refills: 0 | Status: SHIPPED | OUTPATIENT
Start: 2017-05-05 | End: 2017-05-25

## 2017-05-05 NOTE — TELEPHONE ENCOUNTER
Initial anticoag note and most recent cards note reviewed.    As directed by cards we will continue with 3 months of warfarin and indefinite low dose asa after bioprosthetic AVR.    Will defer all other CV care to cards Michael Bloch, MD  Anticoagulation Clinic    Cc:  Roopa Ruelas

## 2017-05-05 NOTE — TELEPHONE ENCOUNTER
Patient gets his O2 from [facility]. He stated that has decided that he no longer needs O2. He stated that he has not been using it. He stated that he checks his O2 saturation at home and it has been in the 90's. He also stated that he has an in home nurse that checks his level as well.

## 2017-05-05 NOTE — PROGRESS NOTES
Chief Complaint   Patient presents with   • Gout     R/ foot, Recent open heart surgery       HISTORY OF PRESENT ILLNESS: Patient is a 57 y.o. male who presents today for evaluation of right foot pain that started a few days ago. Patient was diagnosed with gout for the first time approximately 2 months ago. Patient had open heart surgery last month is now Coumadin. He is waiting to establish with a new primary care provider. Patient denies worsening pain with ambulation. He took some leftover prednisone from his previous gout flare.    Patient Active Problem List    Diagnosis Date Noted   • Chest pain, rule out acute myocardial infarction 03/20/2017     Priority: High   • Non-sustained ventricular tachycardia (CMS-HCC) 03/17/2017     Priority: High   • Aortic stenosis due to bicuspid aortic valve 03/17/2017     Priority: High   • Chronic combined systolic and diastolic CHF (congestive heart failure) (CMS-HCC) 03/21/2017     Priority: Medium   • Renal insufficiency 03/21/2017     Priority: Medium   • Cardiomyopathy (CMS-HCC) 03/20/2017     Priority: Medium   • PVC (premature ventricular contraction) 03/20/2017     Priority: Medium   • Bradycardia 03/17/2017     Priority: Medium   • Hypothyroid 12/06/2016     Priority: Low   • S/P AVR (aortic valve replacement) 04/28/2017   • Essential hypertension, benign 04/28/2017   • Dyslipidemia 04/28/2017   • Chronic anticoagulation 04/20/2017   • History of heart valve replacement with bioprosthetic valve [Z95.4] 04/20/2017   • Obesity (BMI 30-39.9) 03/28/2017   • Acute on chronic heart failure (CMS-HCC) 03/20/2017   • Alcoholism (CMS-HCC) 02/26/2017   • Hypothyroidism 02/26/2017   • Arthritis 02/26/2017   • Chronic sinus bradycardia 02/26/2017   • NSTEMI (non-ST elevated myocardial infarction) (CMS-HCC) 02/26/2017   • Need for Tdap vaccination 02/10/2017   • Abnormal EKG 11/24/2014   • Vitamin D deficiency 10/21/2014   • Accessory skin tags 10/15/2014   • BMI 37.0-37.9, adult  10/14/2014       Allergies:Other misc    Current Outpatient Prescriptions Ordered in [facility]   Medication Sig Dispense Refill   • colchicine (COLCRYS) 0.6 MG Tab 2 tabs by mouth; may repeat x 1 dose if symptoms remain. May repeat dosing the following day as needed for symptoms. 10 Tab 0   • pravastatin (PRAVACHOL) 20 MG Tab Take 2 tablets orally once daily. 180 Tab 0   • oxycodone immediate release (ROXICODONE) 10 MG immediate release tablet Take 1 Tab by mouth every 3 hours as needed (Severe Pain (NRS Pain Scale 7-10; CPOT Pain Scale 6-8)). 75 Tab 0   • warfarin (COUMADIN) 7.5 MG Tab Take 1 Tab by mouth COUMADIN-DAILY. 30 Tab 2   • senna-docusate (PERICOLACE OR SENOKOT S) 8.6-50 MG Tab Take 1 Tab by mouth every day. 30 Tab 0   • aspirin EC (ECOTRIN) 81 MG Tablet Delayed Response Take 81 mg by mouth every day.     • carvedilol (COREG) 3.125 MG Tab Take 1 Tab by mouth 2 times a day, with meals. 60 Tab 1   • Cholecalciferol (VITAMIN D3) 2000 UNITS Tab Take 2,000 Units by mouth every day.     • Aug Betamethasone Dipropionate (DIPROLENE-AF) 0.05 % Cream Apply 1 g to affected area(s) 2 times a day.     • levothyroxine (SYNTHROID) 25 MCG Tab Take 1 Tab by mouth Every morning on an empty stomach. 90 Tab 0   • morphine (MS IR) 15 MG tablet Take 1 Tab by mouth every four hours as needed for Severe Pain. 12 Tab 0   • lisinopril (PRINIVIL) 5 MG Tab Take 1 Tab by mouth every day. 30 Tab 1     No current Epic-ordered facility-administered medications on file.       Past Medical History   Diagnosis Date   • Shortness of breath 10/14/2014   • BMI 37.0-37.9, adult 10/14/2014   • Hyperlipidemia 10/14/2014   • Right knee pain 10/15/2014   • Dry skin 10/15/2014   • Accessory skin tags 10/15/2014   • Enlarged heart 10/15/2014   • Unspecified vitamin D deficiency 10/21/2014   • Hypertension    • Heart burn    • Indigestion    • Dental disorder      partial   • NSTEMI (non-ST elevated myocardial infarction) (CMS-HCC) 2/26/2017       Social  History   Substance Use Topics   • Smoking status: Former Smoker -- 1.00 packs/day for 33 years     Quit date: 2013   • Smokeless tobacco: Never Used   • Alcohol Use: No       Family Status   Relation Status Death Age   • Mother     • Father     • Sister     • Brother Alive      Family History   Problem Relation Age of Onset   • Diabetes Mother    • Cancer Father    • Heart Disease Father    • Heart Failure Father        ROS:    Review of Systems   Constitutional: Negative for fever, chills, weight loss and malaise/fatigue.   HENT: Negative for ear pain, nosebleeds, congestion, sore throat and neck pain.    Eyes: Negative for blurred vision.   Respiratory: Negative for cough, sputum production, shortness of breath and wheezing.    Cardiovascular: Negative for chest pain, palpitations, orthopnea and leg swelling.   Gastrointestinal: Negative for heartburn, nausea, vomiting and abdominal pain.   Genitourinary: Negative for dysuria, urgency and frequency.       Exam:  Blood pressure 140/90, pulse 92, temperature 36.1 °C (97 °F), resp. rate 14, weight 122.925 kg (271 lb), SpO2 96 %.  General: Normal appearing. No distress.  HEENT: Head is grossly normal.  Pulmonary: No respiratory distress noted.  Cardiovascular: Right pedal pulse is strong.  Neurologic: No sensory deficit noted.  Extremities: TTP on the lateral aspect of the right foot, along the fifth metatarsal. Trace edema and minimal erythema noted.  Skin: No obvious lesions.  Psych: Normal mood. Alert and oriented x3. Judgment and insight is normal.    Assessment/Plan:  Use all medication as directed. Follow-up with primary care provider to discuss uric acid lowering medications.  1. Acute gout of right foot, unspecified cause  colchicine (COLCRYS) 0.6 MG Tab

## 2017-05-05 NOTE — MR AVS SNAPSHOT
Gagandeep Durbin Jr.   2017 3:30 PM   Office Visit   MRN: [id number]    Department:  [facility]   Dept Phone:  [phone]    Description:  Male : 1960   Provider:  Heidi Pham PA-C           Reason for Visit     Gout R/ foot, Recent open heart surgery      Allergies as of 2017     Allergen Noted Reactions    Other Misc 04/10/2017   Rash    metals  Blisters on contact site       You were diagnosed with     Acute gout of right foot, unspecified cause   [[id number]]         Vital Signs     Blood Pressure Pulse Temperature Respirations Weight Oxygen Saturation    140/90 mmHg 92 36.1 °C (97 °F) 14 122.925 kg (271 lb) 96%    Smoking Status                   Former Smoker           Basic Information     Date Of Birth Sex Race Ethnicity Preferred Language    1960 Male White Non- English      Your appointments     May 12, 2017  7:40 AM   NEW TO YOU with MERCY Martinez   [facility]    [address] [address] [phone]            May 26, 2017  2:15 PM   ECHO with [facility]CHO [facility] EXAM 10   ECHOCARDIOLOGY [facility] ([facility])    [address]  [place] [address]   [phone]           No prep            May 31, 2017  3:00 PM   FOLLOW UP with Steve Ruelas M.D.   [facility] (--)    [address], [address]  [place] [id number] [phone]              Problem List              ICD-10-CM Priority Class Noted - Resolved    BMI 37.0-37.9, adult Z68.37   10/14/2014 - Present    Accessory skin tags Q82.8   10/15/2014 - Present    Vitamin D deficiency E55.9   10/21/2014 - Present    Abnormal EKG R94.31   2014 - Present    Hypothyroid E03.9 Low  2016 - Present    Need for Tdap vaccination Z23   2/10/2017 - Present    Alcoholism (CMS-HCC) F10.20   2017 - Present    Hypothyroidism E03.9   2017 - Present    Arthritis M19.90   2017 - Present    Chronic sinus bradycardia R00.1   2017  - Present    NSTEMI (non-ST elevated myocardial infarction) (CMS-HCC) I21.4   2/26/2017 - Present    Bradycardia R00.1 Medium  3/17/2017 - Present    Non-sustained ventricular tachycardia (CMS-HCC) I47.2 High  3/17/2017 - Present    Aortic stenosis due to bicuspid aortic valve Q23.0, Q23.1 High  3/17/2017 - Present    Cardiomyopathy (CMS-HCC) I42.9 Medium  3/20/2017 - Present    Acute on chronic heart failure (CMS-HCC) I50.9   3/20/2017 - Present    PVC (premature ventricular contraction) I49.3 Medium  3/20/2017 - Present    Chest pain, rule out acute myocardial infarction R07.9 High  3/20/2017 - Present    Chronic combined systolic and diastolic CHF (congestive heart failure) (CMS-HCC) I50.42 Medium  3/21/2017 - Present    Renal insufficiency N28.9 Medium  3/21/2017 - Present    Obesity (BMI 30-39.9) E66.9   3/28/2017 - Present    Chronic anticoagulation Z79.01   4/20/2017 - Present    History of heart valve replacement with bioprosthetic valve [Z95.4] Z95.4   4/20/2017 - Present    S/P AVR (aortic valve replacement) Z95.2   4/28/2017 - Present    Essential hypertension, benign I10   4/28/2017 - Present    Dyslipidemia E78.5   4/28/2017 - Present      Health Maintenance        Date Due Completion Dates    IMM PNEUMOCOCCAL 19-64 (ADULT) MEDIUM RISK SERIES (1 of 1 - PPSV23) 3/25/1977 ---    COLONOSCOPY 10/1/2021 10/1/2016 (N/S), 3/1/2013 (N/S)    Override on 10/1/2016: (N/S)    Override on 3/1/2013: (N/S)    IMM DTaP/Tdap/Td Vaccine (2 - Td) 2/10/2027 2/10/2017            Current Immunizations     Influenza Vaccine Quad Inj (Pf) 10/15/2016    Tdap Vaccine 2/10/2017  8:30 AM      Below and/or attached are the medications your provider expects you to take. Review all of your home medications and newly ordered medications with your provider and/or pharmacist. Follow medication instructions as directed by your provider and/or pharmacist. Please keep your medication list with you and share with your provider. Update the  information when medications are discontinued, doses are changed, or new medications (including over-the-counter products) are added; and carry medication information at all times in the event of emergency situations     Allergies:  OTHER MISC - Rash               Medications  Valid as of: May 05, 2017 -  4:06 PM    Generic Name Brand Name Tablet Size Instructions for use    Aspirin (Tablet Delayed Response) ECOTRIN 81 MG Take 81 mg by mouth every day.        Betamethasone Dipropionate Aug (Cream) DIPROLENE-AF 0.05 % Apply 1 g to affected area(s) 2 times a day.        Carvedilol (Tab) COREG 3.125 MG Take 1 Tab by mouth 2 times a day, with meals.        Cholecalciferol (Tab) Vitamin D3 2000 UNITS Take 2,000 Units by mouth every day.        Colchicine (Tab) COLCRYS 0.6 MG 2 tabs by mouth; may repeat x 1 dose if symptoms remain. May repeat dosing the following day as needed for symptoms.        Levothyroxine Sodium (Tab) SYNTHROID 25 MCG Take 1 Tab by mouth Every morning on an empty stomach.        Lisinopril (Tab) PRINIVIL 5 MG Take 1 Tab by mouth every day.        Morphine Sulfate (Tab) MS IR 15 MG Take 1 Tab by mouth every four hours as needed for Severe Pain.        OxyCODONE HCl (Tab) ROXICODONE 10 MG Take 1 Tab by mouth every 3 hours as needed (Severe Pain (NRS Pain Scale 7-10; CPOT Pain Scale 6-8)).        Pravastatin Sodium (Tab) PRAVACHOL 20 MG Take 2 tablets orally once daily.        Sennosides-Docusate Sodium (Tab) PERICOLACE or SENOKOT S 8.6-50 MG Take 1 Tab by mouth every day.        Warfarin Sodium (Tab) COUMADIN 7.5 MG Take 1 Tab by mouth COUMADIN-DAILY.        .                 Medicines prescribed today were sent to:     [facility] [address] [place] [address], [place] [facility] [address]    [address] [address]    Phone: [phone] Fax: [phone]    Open 24 Hours?: No      Medication refill instructions:       If your prescription bottle indicates you have medication refills left,  it is not necessary to call your provider’s office. Please contact your pharmacy and they will refill your medication.    If your prescription bottle indicates you do not have any refills left, you may request refills at any time through one of the following ways: The online Sand 9 system (except Urgent Care), by calling your provider’s office, or by asking your pharmacy to contact your provider’s office with a refill request. Medication refills are processed only during regular business hours and may not be available until the next business day. Your provider may request additional information or to have a follow-up visit with you prior to refilling your medication.   *Please Note: Medication refills are assigned a new Rx number when refilled electronically. Your pharmacy may indicate that no refills were authorized even though a new prescription for the same medication is available at the pharmacy. Please request the medicine by name with the pharmacy before contacting your provider for a refill.           Sand 9 Access Code: [email or web address]  Expires: 6/4/2017 10:32 AM    Sand 9  A secure, online tool to manage your health information     [employer]s Sand 9® is a secure, online tool that connects you to your personalized health information from the privacy of your home -- day or night - making it very easy for you to manage your healthcare. Once the activation process is completed, you can even access your medical information using the Sand 9 katiana, which is available for free in the Apple Katiana store or Google Play store.     Sand 9 provides the following levels of access (as shown below):   My Chart Features   [facility] Primary [facility] Doctor [facility]  Specialists [facility]  Urgent  Care Non-[facility]  Primary Care  Doctor   Email your healthcare team securely and privately 24/7 X X X    Manage appointments: schedule your next appointment; view details of past/upcoming appointments X      Request prescription refills.  X      View recent personal medical records, including lab and immunizations X X X X   View health record, including health history, allergies, medications X X X X   Read reports about your outpatient visits, procedures, consult and ER notes X X X X   See your discharge summary, which is a recap of your hospital and/or ER visit that includes your diagnosis, lab results, and care plan. X X       How to register for [employer]:  1. Go to  [email or web address].  2. Click on the Sign Up Now box, which takes you to the New Member Sign Up page. You will need to provide the following information:  a. Enter your [employer] Access Code exactly as it appears at the top of this page. (You will not need to use this code after you’ve completed the sign-up process. If you do not sign up before the expiration date, you must request a new code.)   b. Enter your date of birth.   c. Enter your home email address.   d. Click Submit, and follow the next screen’s instructions.  3. Create a [employer] ID. This will be your [employer] login ID and cannot be changed, so think of one that is secure and easy to remember.  4. Create a [employer] password. You can change your password at any time.  5. Enter your Password Reset Question and Answer. This can be used at a later time if you forget your password.   6. Enter your e-mail address. This allows you to receive e-mail notifications when new information is available in [employer].  7. Click Sign Up. You can now view your health information.    For assistance activating your [employer] account, call [phone]

## 2017-05-05 NOTE — TELEPHONE ENCOUNTER
1. Caller Name: aGgandeep                      Call Back Number: [phone] (home)     2. Message: Patient needs an order faxed over to discontinue oxygen - patient no longer needs this and they will not  without an order from pcp    3. Patient approves office to leave a detailed voicemail/MyChart message: N\A

## 2017-05-05 NOTE — TELEPHONE ENCOUNTER
I will have to talk to Roopa about this. He may need to come in for this.  Roopa, what do you think?

## 2017-05-08 ENCOUNTER — OFFICE VISIT (OUTPATIENT)
Dept: URGENT CARE | Facility: PHYSICIAN GROUP | Age: 57
End: 2017-05-08
Payer: COMMERCIAL

## 2017-05-08 VITALS
OXYGEN SATURATION: 96 % | WEIGHT: 270 LBS | DIASTOLIC BLOOD PRESSURE: 94 MMHG | BODY MASS INDEX: 35.63 KG/M2 | TEMPERATURE: 97 F | SYSTOLIC BLOOD PRESSURE: 150 MMHG | HEART RATE: 82 BPM | RESPIRATION RATE: 14 BRPM

## 2017-05-08 DIAGNOSIS — M10.9 ACUTE GOUT OF RIGHT FOOT, UNSPECIFIED CAUSE: ICD-10-CM

## 2017-05-08 PROCEDURE — 99214 OFFICE O/P EST MOD 30 MIN: CPT | Performed by: PHYSICIAN ASSISTANT

## 2017-05-08 RX ORDER — PREDNISONE 10 MG/1
TABLET ORAL
Qty: 30 QUANTITY SUFFICIENT | Refills: 0 | Status: SHIPPED | OUTPATIENT
Start: 2017-05-08 | End: 2017-05-25

## 2017-05-08 NOTE — PROGRESS NOTES
Chief Complaint   Patient presents with   • Gout     persistent pain in R/ foot, Pt states medication has no effect       HISTORY OF PRESENT ILLNESS: Patient is a 57 y.o. male who presents today for evaluation of persistent pain in his right foot. Patient was seen 5/5/17, diagnosed with gout, and throat colchicine. Patient states that helped Friday, Saturday, and yesterday afternoon started having worsening pain. He states he woke up this morning with pain and spasm was when it started on Friday despite taking the colchicine. Patient has worsening pain with ambulation.    Patient Active Problem List    Diagnosis Date Noted   • Chest pain, rule out acute myocardial infarction 03/20/2017     Priority: High   • Non-sustained ventricular tachycardia (CMS-HCC) 03/17/2017     Priority: High   • Aortic stenosis due to bicuspid aortic valve 03/17/2017     Priority: High   • Chronic combined systolic and diastolic CHF (congestive heart failure) (CMS-HCC) 03/21/2017     Priority: Medium   • Renal insufficiency 03/21/2017     Priority: Medium   • Cardiomyopathy (CMS-HCC) 03/20/2017     Priority: Medium   • PVC (premature ventricular contraction) 03/20/2017     Priority: Medium   • Bradycardia 03/17/2017     Priority: Medium   • Hypothyroid 12/06/2016     Priority: Low   • S/P AVR (aortic valve replacement) 04/28/2017   • Essential hypertension, benign 04/28/2017   • Dyslipidemia 04/28/2017   • Chronic anticoagulation 04/20/2017   • History of heart valve replacement with bioprosthetic valve [Z95.4] 04/20/2017   • Obesity (BMI 30-39.9) 03/28/2017   • Acute on chronic heart failure (CMS-HCC) 03/20/2017   • Alcoholism (CMS-HCC) 02/26/2017   • Hypothyroidism 02/26/2017   • Arthritis 02/26/2017   • Chronic sinus bradycardia 02/26/2017   • NSTEMI (non-ST elevated myocardial infarction) (CMS-HCC) 02/26/2017   • Need for Tdap vaccination 02/10/2017   • Abnormal EKG 11/24/2014   • Vitamin D deficiency 10/21/2014   • Accessory skin tags  10/15/2014   • BMI 37.0-37.9, adult 10/14/2014       Allergies:Other misc    Current Outpatient Prescriptions Ordered in [facility]   Medication Sig Dispense Refill   • predniSONE (DELTASONE) 10 MG Tab 40mg x 4 days; 30mg x 3 days; 20mg x 2 days; 10mg x 1 day 30 Quantity Sufficient 0   • pravastatin (PRAVACHOL) 20 MG Tab Take 2 tablets orally once daily. 180 Tab 0   • oxycodone immediate release (ROXICODONE) 10 MG immediate release tablet Take 1 Tab by mouth every 3 hours as needed (Severe Pain (NRS Pain Scale 7-10; CPOT Pain Scale 6-8)). 75 Tab 0   • warfarin (COUMADIN) 7.5 MG Tab Take 1 Tab by mouth COUMADIN-DAILY. 30 Tab 2   • senna-docusate (PERICOLACE OR SENOKOT S) 8.6-50 MG Tab Take 1 Tab by mouth every day. 30 Tab 0   • aspirin EC (ECOTRIN) 81 MG Tablet Delayed Response Take 81 mg by mouth every day.     • carvedilol (COREG) 3.125 MG Tab Take 1 Tab by mouth 2 times a day, with meals. 60 Tab 1   • Cholecalciferol (VITAMIN D3) 2000 UNITS Tab Take 2,000 Units by mouth every day.     • Aug Betamethasone Dipropionate (DIPROLENE-AF) 0.05 % Cream Apply 1 g to affected area(s) 2 times a day.     • levothyroxine (SYNTHROID) 25 MCG Tab Take 1 Tab by mouth Every morning on an empty stomach. 90 Tab 0   • colchicine (COLCRYS) 0.6 MG Tab 2 tabs by mouth; may repeat x 1 dose if symptoms remain. May repeat dosing the following day as needed for symptoms. 10 Tab 0   • morphine (MS IR) 15 MG tablet Take 1 Tab by mouth every four hours as needed for Severe Pain. 12 Tab 0   • lisinopril (PRINIVIL) 5 MG Tab Take 1 Tab by mouth every day. 30 Tab 1     No current Epic-ordered facility-administered medications on file.       Past Medical History   Diagnosis Date   • Shortness of breath 10/14/2014   • BMI 37.0-37.9, adult 10/14/2014   • Hyperlipidemia 10/14/2014   • Right knee pain 10/15/2014   • Dry skin 10/15/2014   • Accessory skin tags 10/15/2014   • Enlarged heart 10/15/2014   • Unspecified vitamin D deficiency 10/21/2014   •  Hypertension    • Heart burn    • Indigestion    • Dental disorder      partial   • NSTEMI (non-ST elevated myocardial infarction) (CMS-[facility]) 2017       Social History   Substance Use Topics   • Smoking status: Former Smoker -- 1.00 packs/day for 33 years     Quit date: 2013   • Smokeless tobacco: Never Used   • Alcohol Use: No       Family Status   Relation Status Death Age   • Mother     • Father     • Sister     • Brother Alive      Family History   Problem Relation Age of Onset   • Diabetes Mother    • Cancer Father    • Heart Disease Father    • Heart Failure Father        ROS:   Review of Systems   Constitutional: Negative for fever, chills, weight loss and malaise/fatigue.   HENT: Negative for ear pain, nosebleeds, congestion, sore throat and neck pain.    Eyes: Negative for blurred vision.   Respiratory: Negative for cough, sputum production, shortness of breath and wheezing.    Cardiovascular: Negative for chest pain, palpitations, orthopnea and leg swelling.   Gastrointestinal: Negative for heartburn, nausea, vomiting and abdominal pain.   Genitourinary: Negative for dysuria, urgency and frequency.       Exam:  Blood pressure 150/94, pulse 82, temperature 36.1 °C (97 °F), resp. rate 14, weight 122.471 kg (270 lb), SpO2 96 %.  General: Normal appearing. No distress.  General: Normal appearing. No distress.  HEENT: Head is grossly normal.  Pulmonary: No respiratory distress noted.  Cardiovascular: Right pedal pulse is strong.  Neurologic: No sensory deficit noted.  Extremities: TTP on the lateral aspect of the right foot, along the fifth metatarsal. Trace edema and minimal erythema noted.  Skin: No obvious lesions.  Psych: Normal mood. Alert and oriented x3. Judgment and insight is normal.    Assessment/Plan:  Take all medication as directed. Patient has his INR checked weekly by his home health nurse. Advised that he notify his home health nurse that he is on prednisone so  that they may notify the pharmacist. Follow-up for worsening or persistent symptoms.  1. Acute gout of right foot, unspecified cause  predniSONE (DELTASONE) 10 MG Tab

## 2017-05-08 NOTE — MR AVS SNAPSHOT
Gagandeep Woodhull Jr.   2017 4:10 PM   Office Visit   MRN: [id number]    Department:  [facility]   Dept Phone:  [phone]    Description:  Male : 1960   Provider:  Heidi Pham PA-C           Reason for Visit     Gout persistent pain in R/ foot, Pt states medication has no effect      Allergies as of 2017     Allergen Noted Reactions    Other Misc 04/10/2017   Rash    metals  Blisters on contact site       You were diagnosed with     Acute gout of right foot, unspecified cause   [[id number]]         Vital Signs     Blood Pressure Pulse Temperature Respirations Weight Oxygen Saturation    150/94 mmHg 82 36.1 °C (97 °F) 14 122.471 kg (270 lb) 96%    Smoking Status                   Former Smoker           Basic Information     Date Of Birth Sex Race Ethnicity Preferred Language    1960 Male White Non- English      Your appointments     May 12, 2017  7:40 AM   NEW TO YOU with MERCY Martinez   [facility]    [address] [address] [phone]            May 26, 2017  2:15 PM   ECHO with ECHO [facility] EXAM 10   ECHOCARDIOLOGY [facility] ([facility])    [address] [place] [address]   [phone]           No prep            May 31, 2017  3:00 PM   FOLLOW UP with Steve Ruelas M.D.   [facility] (--)    [address], [address]  [place] [id number] [phone]              Problem List              ICD-10-CM Priority Class Noted - Resolved    BMI 37.0-37.9, adult Z68.37   10/14/2014 - Present    Accessory skin tags Q82.8   10/15/2014 - Present    Vitamin D deficiency E55.9   10/21/2014 - Present    Abnormal EKG R94.31   2014 - Present    Hypothyroid E03.9 Low  2016 - Present    Need for Tdap vaccination Z23   2/10/2017 - Present    Alcoholism (CMS-HCC) F10.20   2017 - Present    Hypothyroidism E03.9   2017 - Present    Arthritis M19.90   2017 - Present    Chronic sinus  bradycardia R00.1   2/26/2017 - Present    NSTEMI (non-ST elevated myocardial infarction) (CMS-HCC) I21.4   2/26/2017 - Present    Bradycardia R00.1 Medium  3/17/2017 - Present    Non-sustained ventricular tachycardia (CMS-HCC) I47.2 High  3/17/2017 - Present    Aortic stenosis due to bicuspid aortic valve Q23.0, Q23.1 High  3/17/2017 - Present    Cardiomyopathy (CMS-HCC) I42.9 Medium  3/20/2017 - Present    Acute on chronic heart failure (CMS-HCC) I50.9   3/20/2017 - Present    PVC (premature ventricular contraction) I49.3 Medium  3/20/2017 - Present    Chest pain, rule out acute myocardial infarction R07.9 High  3/20/2017 - Present    Chronic combined systolic and diastolic CHF (congestive heart failure) (CMS-HCC) I50.42 Medium  3/21/2017 - Present    Renal insufficiency N28.9 Medium  3/21/2017 - Present    Obesity (BMI 30-39.9) E66.9   3/28/2017 - Present    Chronic anticoagulation Z79.01   4/20/2017 - Present    History of heart valve replacement with bioprosthetic valve [Z95.4] Z95.4   4/20/2017 - Present    S/P AVR (aortic valve replacement) Z95.2   4/28/2017 - Present    Essential hypertension, benign I10   4/28/2017 - Present    Dyslipidemia E78.5   4/28/2017 - Present      Health Maintenance        Date Due Completion Dates    IMM PNEUMOCOCCAL 19-64 (ADULT) MEDIUM RISK SERIES (1 of 1 - PPSV23) 3/25/1977 ---    COLONOSCOPY 10/1/2021 10/1/2016 (N/S), 3/1/2013 (N/S)    Override on 10/1/2016: (N/S)    Override on 3/1/2013: (N/S)    IMM DTaP/Tdap/Td Vaccine (2 - Td) 2/10/2027 2/10/2017            Current Immunizations     Influenza Vaccine Quad Inj (Pf) 10/15/2016    Tdap Vaccine 2/10/2017  8:30 AM      Below and/or attached are the medications your provider expects you to take. Review all of your home medications and newly ordered medications with your provider and/or pharmacist. Follow medication instructions as directed by your provider and/or pharmacist. Please keep your medication list with you and share  with your provider. Update the information when medications are discontinued, doses are changed, or new medications (including over-the-counter products) are added; and carry medication information at all times in the event of emergency situations     Allergies:  OTHER MISC - Rash               Medications  Valid as of: May 08, 2017 -  4:59 PM    Generic Name Brand Name Tablet Size Instructions for use    Aspirin (Tablet Delayed Response) ECOTRIN 81 MG Take 81 mg by mouth every day.        Betamethasone Dipropionate Aug (Cream) DIPROLENE-AF 0.05 % Apply 1 g to affected area(s) 2 times a day.        Carvedilol (Tab) COREG 3.125 MG Take 1 Tab by mouth 2 times a day, with meals.        Cholecalciferol (Tab) Vitamin D3 2000 UNITS Take 2,000 Units by mouth every day.        Colchicine (Tab) COLCRYS 0.6 MG 2 tabs by mouth; may repeat x 1 dose if symptoms remain. May repeat dosing the following day as needed for symptoms.        Levothyroxine Sodium (Tab) SYNTHROID 25 MCG Take 1 Tab by mouth Every morning on an empty stomach.        Lisinopril (Tab) PRINIVIL 5 MG Take 1 Tab by mouth every day.        Morphine Sulfate (Tab) MS IR 15 MG Take 1 Tab by mouth every four hours as needed for Severe Pain.        OxyCODONE HCl (Tab) ROXICODONE 10 MG Take 1 Tab by mouth every 3 hours as needed (Severe Pain (NRS Pain Scale 7-10; CPOT Pain Scale 6-8)).        Pravastatin Sodium (Tab) PRAVACHOL 20 MG Take 2 tablets orally once daily.        PredniSONE (Tab) DELTASONE 10 MG 40mg x 4 days; 30mg x 3 days; 20mg x 2 days; 10mg x 1 day        Sennosides-Docusate Sodium (Tab) PERICOLACE or SENOKOT S 8.6-50 MG Take 1 Tab by mouth every day.        Warfarin Sodium (Tab) COUMADIN 7.5 MG Take 1 Tab by mouth COUMADIN-DAILY.        .                 Medicines prescribed today were sent to:     [facility] - [address]    [address] [address]    Phone: [phone] Fax: [phone]    Open  24 Hours?: No      Medication refill instructions:       If your prescription bottle indicates you have medication refills left, it is not necessary to call your provider’s office. Please contact your pharmacy and they will refill your medication.    If your prescription bottle indicates you do not have any refills left, you may request refills at any time through one of the following ways: The online [employer] system (except Urgent Care), by calling your provider’s office, or by asking your pharmacy to contact your provider’s office with a refill request. Medication refills are processed only during regular business hours and may not be available until the next business day. Your provider may request additional information or to have a follow-up visit with you prior to refilling your medication.   *Please Note: Medication refills are assigned a new Rx number when refilled electronically. Your pharmacy may indicate that no refills were authorized even though a new prescription for the same medication is available at the pharmacy. Please request the medicine by name with the pharmacy before contacting your provider for a refill.           [employer] Access Code: [email or web address]  Expires: 6/4/2017 10:32 AM    [employer]  A secure, online tool to manage your health information     [employer]® is a secure, online tool that connects you to your personalized health information from the privacy of your home -- day or night - making it very easy for you to manage your healthcare. Once the activation process is completed, you can even access your medical information using the [employer] katiana, which is available for free in the [employer] Katiana store or Google Play store.     [employer] provides the following levels of access (as shown below):   My Chart Features   [facility] Doctor [facility]  Specialists [facility]  Urgent  Care Non-[facility]  Primary Care  Doctor   Email your healthcare team securely and privately 24/7 X X X     Manage appointments: schedule your next appointment; view details of past/upcoming appointments X      Request prescription refills. X      View recent personal medical records, including lab and immunizations X X X X   View health record, including health history, allergies, medications X X X X   Read reports about your outpatient visits, procedures, consult and ER notes X X X X   See your discharge summary, which is a recap of your hospital and/or ER visit that includes your diagnosis, lab results, and care plan. X X       How to register for [employer]:  1. Go to  [email or web address].  2. Click on the Sign Up Now box, which takes you to the New Member Sign Up page. You will need to provide the following information:  a. Enter your [employer] Access Code exactly as it appears at the top of this page. (You will not need to use this code after you’ve completed the sign-up process. If you do not sign up before the expiration date, you must request a new code.)   b. Enter your date of birth.   c. Enter your home email address.   d. Click Submit, and follow the next screen’s instructions.  3. Create a [employer] ID. This will be your [employer] login ID and cannot be changed, so think of one that is secure and easy to remember.  4. Create a [employer] password. You can change your password at any time.  5. Enter your Password Reset Question and Answer. This can be used at a later time if you forget your password.   6. Enter your e-mail address. This allows you to receive e-mail notifications when new information is available in [employer].  7. Click Sign Up. You can now view your health information.    For assistance activating your [employer] account, call [phone]

## 2017-05-11 ENCOUNTER — OFFICE VISIT (OUTPATIENT)
Dept: MEDICAL GROUP | Facility: PHYSICIAN GROUP | Age: 57
End: 2017-05-11
Payer: COMMERCIAL

## 2017-05-11 ENCOUNTER — ANTICOAGULATION MONITORING (OUTPATIENT)
Dept: VASCULAR LAB | Facility: MEDICAL CENTER | Age: 57
End: 2017-05-11

## 2017-05-11 VITALS — HEIGHT: 73 IN | WEIGHT: 270 LBS | BODY MASS INDEX: 35.78 KG/M2

## 2017-05-11 DIAGNOSIS — Z95.3 HISTORY OF HEART VALVE REPLACEMENT WITH BIOPROSTHETIC VALVE: ICD-10-CM

## 2017-05-11 DIAGNOSIS — M10.9 ACUTE GOUT OF RIGHT FOOT, UNSPECIFIED CAUSE: ICD-10-CM

## 2017-05-11 PROBLEM — I25.2 HISTORY OF NON-ST ELEVATION MYOCARDIAL INFARCTION (NSTEMI): Status: ACTIVE | Noted: 2017-02-26

## 2017-05-11 PROBLEM — Z86.79 HISTORY OF VENTRICULAR TACHYCARDIA: Status: ACTIVE | Noted: 2017-03-17

## 2017-05-11 LAB — INR PPP: 5.6 (ref 2–3.5)

## 2017-05-11 PROCEDURE — 99212 OFFICE O/P EST SF 10 MIN: CPT | Performed by: NURSE PRACTITIONER

## 2017-05-11 RX ORDER — FUROSEMIDE 40 MG/1
TABLET ORAL
COMMUNITY
Start: 2017-04-14 | End: 2017-05-25

## 2017-05-11 RX ORDER — POTASSIUM CHLORIDE 1500 MG/1
TABLET, EXTENDED RELEASE ORAL
COMMUNITY
Start: 2017-04-14 | End: 2017-12-14

## 2017-05-11 NOTE — PATIENT INSTRUCTIONS
Gout  Gout is an inflammatory arthritis caused by a buildup of uric acid crystals in the joints. Uric acid is a chemical that is normally present in the blood. When the level of uric acid in the blood is too high it can form crystals that deposit in your joints and tissues. This causes joint redness, soreness, and swelling (inflammation). Repeat attacks are common. Over time, uric acid crystals can form into masses (tophi) near a joint, destroying bone and causing disfigurement. Gout is treatable and often preventable.  CAUSES   The disease begins with elevated levels of uric acid in the blood. Uric acid is produced by your body when it breaks down a naturally found substance called purines. Certain foods you eat, such as meats and fish, contain high amounts of purines. Causes of an elevated uric acid level include:  · Being passed down from parent to child (heredity).  · Diseases that cause increased uric acid production (such as obesity, psoriasis, and certain cancers).  · Excessive alcohol use.  · Diet, especially diets rich in meat and seafood.  · Medicines, including certain cancer-fighting medicines (chemotherapy), water pills (diuretics), and aspirin.  · Chronic kidney disease. The kidneys are no longer able to remove uric acid well.  · Problems with metabolism.  Conditions strongly associated with gout include:  · Obesity.  · High blood pressure.  · High cholesterol.  · Diabetes.  Not everyone with elevated uric acid levels gets gout. It is not understood why some people get gout and others do not. Surgery, joint injury, and eating too much of certain foods are some of the factors that can lead to gout attacks.  SYMPTOMS   · An attack of gout comes on quickly. It causes intense pain with redness, swelling, and warmth in a joint.  · Fever can occur.  · Often, only one joint is involved. Certain joints are more commonly involved:  ¨ Base of the big toe.  ¨ Knee.  ¨ Ankle.  ¨ Wrist.  ¨ Finger.  Without  treatment, an attack usually goes away in a few days to weeks. Between attacks, you usually will not have symptoms, which is different from many other forms of arthritis.  DIAGNOSIS   Your caregiver will suspect gout based on your symptoms and exam. In some cases, tests may be recommended. The tests may include:  · Blood tests.  · Urine tests.  · X-rays.  · Joint fluid exam. This exam requires a needle to remove fluid from the joint (arthrocentesis). Using a microscope, gout is confirmed when uric acid crystals are seen in the joint fluid.  TREATMENT   There are two phases to gout treatment: treating the sudden onset (acute) attack and preventing attacks (prophylaxis).  · Treatment of an Acute Attack.  ¨ Medicines are used. These include anti-inflammatory medicines or steroid medicines.  ¨ An injection of steroid medicine into the affected joint is sometimes necessary.  ¨ The painful joint is rested. Movement can worsen the arthritis.  ¨ You may use warm or cold treatments on painful joints, depending which works best for you.  · Treatment to Prevent Attacks.  ¨ If you suffer from frequent gout attacks, your caregiver may advise preventive medicine. These medicines are started after the acute attack subsides. These medicines either help your kidneys eliminate uric acid from your body or decrease your uric acid production. You may need to stay on these medicines for a very long time.  ¨ The early phase of treatment with preventive medicine can be associated with an increase in acute gout attacks. For this reason, during the first few months of treatment, your caregiver may also advise you to take medicines usually used for acute gout treatment. Be sure you understand your caregiver's directions. Your caregiver may make several adjustments to your medicine dose before these medicines are effective.  ¨ Discuss dietary treatment with your caregiver or dietitian. Alcohol and drinks high in sugar and fructose and foods  such as meat, poultry, and seafood can increase uric acid levels. Your caregiver or dietitian can advise you on drinks and foods that should be limited.  HOME CARE INSTRUCTIONS   · Do not take aspirin to relieve pain. This raises uric acid levels.  · Only take over-the-counter or prescription medicines for pain, discomfort, or fever as directed by your caregiver.  · Rest the joint as much as possible. When in bed, keep sheets and blankets off painful areas.  · Keep the affected joint raised (elevated).  · Apply warm or cold treatments to painful joints. Use of warm or cold treatments depends on which works best for you.  · Use crutches if the painful joint is in your leg.  · Drink enough fluids to keep your urine clear or pale yellow. This helps your body get rid of uric acid. Limit alcohol, sugary drinks, and fructose drinks.  · Follow your dietary instructions. Pay careful attention to the amount of protein you eat. Your daily diet should emphasize fruits, vegetables, whole grains, and fat-free or low-fat milk products. Discuss the use of coffee, vitamin C, and cherries with your caregiver or dietitian. These may be helpful in lowering uric acid levels.  · Maintain a healthy body weight.  SEEK MEDICAL CARE IF:   · You develop diarrhea, vomiting, or any side effects from medicines.  · You do not feel better in 24 hours, or you are getting worse.  SEEK IMMEDIATE MEDICAL CARE IF:   · Your joint becomes suddenly more tender, and you have chills or a fever.  MAKE SURE YOU:   · Understand these instructions.  · Will watch your condition.  · Will get help right away if you are not doing well or get worse.     This information is not intended to replace advice given to you by your health care provider. Make sure you discuss any questions you have with your health care provider.     Document Released: 12/15/2001 Document Revised: 01/08/2016 Document Reviewed: 07/31/2013  [employer] Interactive Patient Education ©2016  [employer]

## 2017-05-11 NOTE — PROGRESS NOTES
OP Anticoagulation Service Note    Date: 5/11/2017    Anticoagulation Summary as of 5/11/2017     INR goal 2.0-3.0   Selected INR 5.6! (5/11/2017)   Maintenance plan 7.5 mg (7.5 mg x 1) every day   Weekly total 52.5 mg   Plan last modified FARZANA CanoD (4/20/2017)   Next INR check 5/15/2017   Target end date 7/10/2017    Indications   History of heart valve replacement with bioprosthetic valve [Z95.4] [Z95.4]         Anticoagulation Episode Summary     INR check location Home Draw    Preferred lab     Send INR reminders to     [facility]      Anticoagulation Care Providers     Provider Role Specialty Phone number    Amrit Rosario PHARMD Responsible            Plan:  INR is high today. Spoke with pt on the phone.  Confirmed dosing regimen. No missed or extra doses taken. Patient denies sign/symptoms of bleeding/clotting. Patient has been on prednisone x 4 days and will be on for another week. Patient has been eating a consistent diet. Instructed pt to call clinic with any concerns of bleeding or thrombosis. Instructed pt to seek medical attention for s/s of bleeding or if he falls. Instructed pt to HOLD x 2 days then reduce dose.  Follow 4 days    Pam Khan, Pharm D

## 2017-05-11 NOTE — MR AVS SNAPSHOT
"        Gagandeep Durbin    2017 8:40 AM   Office Visit   MRN: [id number]    Department:  [facility]   Dept Phone:  [phone]    Description:  Male : 1960   Provider:  MERCY Martinez           Reason for Visit     Gout right foot pain      Allergies as of 2017     Allergen Noted Reactions    Other Misc 04/10/2017   Rash    metals  Blisters on contact site       You were diagnosed with     Acute gout of right foot, unspecified cause   [[id number]]         Vital Signs     Height Weight Body Mass Index Smoking Status          1.854 m (6' 1\") 122.471 kg (270 lb) 35.63 kg/m2 Former Smoker        Basic Information     Date Of Birth Sex Race Ethnicity Preferred Language    1960 Male White Non- English      Your appointments     May 25, 2017  8:20 AM   NEW TO YOU with MERCY Martinez   [facility] ([place])    [address] [place] [id number] [phone]            May 26, 2017  2:15 PM   ECHO with ECHO [facility] EXAM 10   ECHOCARDIOLOGY [facility] ([facility])    [address] [facility]  [place] [address]   [phone]           No prep            May 31, 2017  3:00 PM   FOLLOW UP with Steve Ruelas M.D.   [facility] (--)    [address], [address]  [place] [id number] [phone]              Problem List              ICD-10-CM Priority Class Noted - Resolved    BMI 37.0-37.9, adult Z68.37   10/14/2014 - Present    Accessory skin tags Q82.8   10/15/2014 - Present    Vitamin D deficiency E55.9   10/21/2014 - Present    Abnormal EKG R94.31   2014 - Present    Hypothyroid E03.9 Low  2016 - Present    Need for Tdap vaccination Z23   2/10/2017 - Present    Alcoholism (CMS-HCC) F10.20   2017 - Present    Hypothyroidism E03.9   2017 - Present    Arthritis M19.90   2017 - Present    Chronic sinus bradycardia R00.1   2017 - Present    History of non-ST elevation myocardial infarction (NSTEMI) I25.2   2017 " - Present    Bradycardia R00.1 Medium  3/17/2017 - Present    History of ventricular tachycardia Z86.79 High  3/17/2017 - Present    Aortic stenosis due to bicuspid aortic valve Q23.0, Q23.1 High  3/17/2017 - Present    Cardiomyopathy (CMS-HCC) I42.9 Medium  3/20/2017 - Present    Acute on chronic heart failure (CMS-HCC) I50.9   3/20/2017 - Present    PVC (premature ventricular contraction) I49.3 Medium  3/20/2017 - Present    Chest pain, rule out acute myocardial infarction R07.9 High  3/20/2017 - Present    Chronic combined systolic and diastolic CHF (congestive heart failure) (CMS-HCC) I50.42 Medium  3/21/2017 - Present    Renal insufficiency N28.9 Medium  3/21/2017 - Present    Obesity (BMI 30-39.9) E66.9   3/28/2017 - Present    Chronic anticoagulation Z79.01   4/20/2017 - Present    History of heart valve replacement with bioprosthetic valve [Z95.4] Z95.4   4/20/2017 - Present    S/P AVR (aortic valve replacement) Z95.2   4/28/2017 - Present    Essential hypertension, benign I10   4/28/2017 - Present    Dyslipidemia E78.5   4/28/2017 - Present      Health Maintenance        Date Due Completion Dates    IMM PNEUMOCOCCAL 19-64 (ADULT) MEDIUM RISK SERIES (1 of 1 - PPSV23) 3/25/1977 ---    COLONOSCOPY 10/1/2021 10/1/2016 (N/S), 3/1/2013 (N/S)    Override on 10/1/2016: (N/S)    Override on 3/1/2013: (N/S)    IMM DTaP/Tdap/Td Vaccine (2 - Td) 2/10/2027 2/10/2017            Current Immunizations     Influenza Vaccine Quad Inj (Pf) 10/15/2016    Tdap Vaccine 2/10/2017  8:30 AM      Below and/or attached are the medications your provider expects you to take. Review all of your home medications and newly ordered medications with your provider and/or pharmacist. Follow medication instructions as directed by your provider and/or pharmacist. Please keep your medication list with you and share with your provider. Update the information when medications are discontinued, doses are changed, or new medications (including  over-the-counter products) are added; and carry medication information at all times in the event of emergency situations     Allergies:  OTHER MISC - Rash               Medications  Valid as of: May 11, 2017 -  9:16 AM    Generic Name Brand Name Tablet Size Instructions for use    Aspirin (Tablet Delayed Response) ECOTRIN 81 MG Take 81 mg by mouth every day.        Betamethasone Dipropionate Aug (Cream) DIPROLENE-AF 0.05 % Apply 1 g to affected area(s) 2 times a day.        Carvedilol (Tab) COREG 3.125 MG Take 1 Tab by mouth 2 times a day, with meals.        Cholecalciferol (Tab) Vitamin D3 2000 UNITS Take 2,000 Units by mouth every day.        Colchicine (Tab) COLCRYS 0.6 MG 2 tabs by mouth; may repeat x 1 dose if symptoms remain. May repeat dosing the following day as needed for symptoms.        Furosemide (Tab) LASIX 40 MG         Levothyroxine Sodium (Tab) SYNTHROID 25 MCG Take 1 Tab by mouth Every morning on an empty stomach.        Lisinopril (Tab) PRINIVIL 5 MG Take 1 Tab by mouth every day.        Morphine Sulfate (Tab) MS IR 15 MG Take 1 Tab by mouth every four hours as needed for Severe Pain.        OxyCODONE HCl (Tab) ROXICODONE 10 MG Take 1 Tab by mouth every 3 hours as needed (Severe Pain (NRS Pain Scale 7-10; CPOT Pain Scale 6-8)).        Potassium Chloride Marlin CR (Tab CR) KLOR-CON M20 20 MEQ         Pravastatin Sodium (Tab) PRAVACHOL 20 MG Take 2 tablets orally once daily.        PredniSONE (Tab) DELTASONE 10 MG 40mg x 4 days; 30mg x 3 days; 20mg x 2 days; 10mg x 1 day        Sennosides-Docusate Sodium (Tab) PERICOLACE or SENOKOT S 8.6-50 MG Take 1 Tab by mouth every day.        Warfarin Sodium (Tab) COUMADIN 7.5 MG Take 1 Tab by mouth COUMADIN-DAILY.        .                 Medicines prescribed today were sent to:     [facility] [address]    [address] [address]    Phone: [phone] Fax: [phone]    Open 24 Hours?: No         Medication refill instructions:       If your prescription bottle indicates you have medication refills left, it is not necessary to call your provider’s office. Please contact your pharmacy and they will refill your medication.    If your prescription bottle indicates you do not have any refills left, you may request refills at any time through one of the following ways: The online [employer] system (except Urgent Care), by calling your provider’s office, or by asking your pharmacy to contact your provider’s office with a refill request. Medication refills are processed only during regular business hours and may not be available until the next business day. Your provider may request additional information or to have a follow-up visit with you prior to refilling your medication.   *Please Note: Medication refills are assigned a new Rx number when refilled electronically. Your pharmacy may indicate that no refills were authorized even though a new prescription for the same medication is available at the pharmacy. Please request the medicine by name with the pharmacy before contacting your provider for a refill.        Instructions    Gout  Gout is an inflammatory arthritis caused by a buildup of uric acid crystals in the joints. Uric acid is a chemical that is normally present in the blood. When the level of uric acid in the blood is too high it can form crystals that deposit in your joints and tissues. This causes joint redness, soreness, and swelling (inflammation). Repeat attacks are common. Over time, uric acid crystals can form into masses (tophi) near a joint, destroying bone and causing disfigurement. Gout is treatable and often preventable.  CAUSES   The disease begins with elevated levels of uric acid in the blood. Uric acid is produced by your body when it breaks down a naturally found substance called purines. Certain foods you eat, such as meats and fish, contain high amounts of purines. Causes of an  elevated uric acid level include:  · Being passed down from parent to child (heredity).  · Diseases that cause increased uric acid production (such as obesity, psoriasis, and certain cancers).  · Excessive alcohol use.  · Diet, especially diets rich in meat and seafood.  · Medicines, including certain cancer-fighting medicines (chemotherapy), water pills (diuretics), and aspirin.  · Chronic kidney disease. The kidneys are no longer able to remove uric acid well.  · Problems with metabolism.  Conditions strongly associated with gout include:  · Obesity.  · High blood pressure.  · High cholesterol.  · Diabetes.  Not everyone with elevated uric acid levels gets gout. It is not understood why some people get gout and others do not. Surgery, joint injury, and eating too much of certain foods are some of the factors that can lead to gout attacks.  SYMPTOMS   · An attack of gout comes on quickly. It causes intense pain with redness, swelling, and warmth in a joint.  · Fever can occur.  · Often, only one joint is involved. Certain joints are more commonly involved:  ¨ Base of the big toe.  ¨ Knee.  ¨ Ankle.  ¨ Wrist.  ¨ Finger.  Without treatment, an attack usually goes away in a few days to weeks. Between attacks, you usually will not have symptoms, which is different from many other forms of arthritis.  DIAGNOSIS   Your caregiver will suspect gout based on your symptoms and exam. In some cases, tests may be recommended. The tests may include:  · Blood tests.  · Urine tests.  · X-rays.  · Joint fluid exam. This exam requires a needle to remove fluid from the joint (arthrocentesis). Using a microscope, gout is confirmed when uric acid crystals are seen in the joint fluid.  TREATMENT   There are two phases to gout treatment: treating the sudden onset (acute) attack and preventing attacks (prophylaxis).  · Treatment of an Acute Attack.  ¨ Medicines are used. These include anti-inflammatory medicines or steroid  medicines.  ¨ An injection of steroid medicine into the affected joint is sometimes necessary.  ¨ The painful joint is rested. Movement can worsen the arthritis.  ¨ You may use warm or cold treatments on painful joints, depending which works best for you.  · Treatment to Prevent Attacks.  ¨ If you suffer from frequent gout attacks, your caregiver may advise preventive medicine. These medicines are started after the acute attack subsides. These medicines either help your kidneys eliminate uric acid from your body or decrease your uric acid production. You may need to stay on these medicines for a very long time.  ¨ The early phase of treatment with preventive medicine can be associated with an increase in acute gout attacks. For this reason, during the first few months of treatment, your caregiver may also advise you to take medicines usually used for acute gout treatment. Be sure you understand your caregiver's directions. Your caregiver may make several adjustments to your medicine dose before these medicines are effective.  ¨ Discuss dietary treatment with your caregiver or dietitian. Alcohol and drinks high in sugar and fructose and foods such as meat, poultry, and seafood can increase uric acid levels. Your caregiver or dietitian can advise you on drinks and foods that should be limited.  HOME CARE INSTRUCTIONS   · Do not take aspirin to relieve pain. This raises uric acid levels.  · Only take over-the-counter or prescription medicines for pain, discomfort, or fever as directed by your caregiver.  · Rest the joint as much as possible. When in bed, keep sheets and blankets off painful areas.  · Keep the affected joint raised (elevated).  · Apply warm or cold treatments to painful joints. Use of warm or cold treatments depends on which works best for you.  · Use crutches if the painful joint is in your leg.  · Drink enough fluids to keep your urine clear or pale yellow. This helps your body get rid of uric acid.  Limit alcohol, sugary drinks, and fructose drinks.  · Follow your dietary instructions. Pay careful attention to the amount of protein you eat. Your daily diet should emphasize fruits, vegetables, whole grains, and fat-free or low-fat milk products. Discuss the use of coffee, vitamin C, and cherries with your caregiver or dietitian. These may be helpful in lowering uric acid levels.  · Maintain a healthy body weight.  SEEK MEDICAL CARE IF:   · You develop diarrhea, vomiting, or any side effects from medicines.  · You do not feel better in 24 hours, or you are getting worse.  SEEK IMMEDIATE MEDICAL CARE IF:   · Your joint becomes suddenly more tender, and you have chills or a fever.  MAKE SURE YOU:   · Understand these instructions.  · Will watch your condition.  · Will get help right away if you are not doing well or get worse.     This information is not intended to replace advice given to you by your health care provider. Make sure you discuss any questions you have with your health care provider.     Document Released: 12/15/2001 Document Revised: 01/08/2016 Document Reviewed: 07/31/2013  [employer] Interactive Patient Education ©2016 [employer]            [employer] Access Code: [email or web address]  Expires: 6/4/2017 10:32 AM    [employer]  A secure, online tool to manage your health information     [employer]® is a secure, online tool that connects you to your personalized health information from the privacy of your home -- day or night - making it very easy for you to manage your healthcare. Once the activation process is completed, you can even access your medical information using the [employer] katiana, which is available for free in the Apple Katiana store or Google Play store.     [employer] provides the following levels of access (as shown below):   My Chart Features   [facility] Doctor [facility]  Specialists [facility]  Urgent  Care Non-[facility]  Primary Care  Doctor   Email your healthcare team securely and  privately 24/7 X X X    Manage appointments: schedule your next appointment; view details of past/upcoming appointments X      Request prescription refills. X      View recent personal medical records, including lab and immunizations X X X X   View health record, including health history, allergies, medications X X X X   Read reports about your outpatient visits, procedures, consult and ER notes X X X X   See your discharge summary, which is a recap of your hospital and/or ER visit that includes your diagnosis, lab results, and care plan. X X       How to register for [employer]:  1. Go to  [email or web address]  2. Click on the Sign Up Now box, which takes you to the New Member Sign Up page. You will need to provide the following information:  a. Enter your [employer] Access Code exactly as it appears at the top of this page. (You will not need to use this code after you’ve completed the sign-up process. If you do not sign up before the expiration date, you must request a new code.)   b. Enter your date of birth.   c. Enter your home email address.   d. Click Submit, and follow the next screen’s instructions.  3. Create a [employer] ID. This will be your [employer] login ID and cannot be changed, so think of one that is secure and easy to remember.  4. Create a [employer] password. You can change your password at any time.  5. Enter your Password Reset Question and Answer. This can be used at a later time if you forget your password.   6. Enter your e-mail address. This allows you to receive e-mail notifications when new information is available in [employer].  7. Click Sign Up. You can now view your health information.    For assistance activating your [employer] account, call [phone]

## 2017-05-11 NOTE — PROGRESS NOTES
Subjective:     Chief Complaint   Patient presents with   • Gout     right foot pain       HPI  Gagandeep Durbin Jr. is a 57 y.o. male here today for urgent care follow-up on acute gout of right foot. Symptoms originally started back in March. He was treated with colchicine which did not work and symptoms worsened. At reevaluation he was switched to prednisone. Prednisone has been working very well to decrease the pain and inflammation. States that he is 90% better since starting prednisone 3 days ago. Now able to walk on his foot again and able to bear full weight. Review of risk factors, patient is overweight, has hypertension, enjoys drinking beer, and shrimp is a frequent food for him. Uric acid level was 8.9.       The encounter diagnosis was Acute gout of right foot, unspecified cause.    Allergies: Other misc  Current medicines (including changes today)  Current Outpatient Prescriptions   Medication Sig Dispense Refill   • predniSONE (DELTASONE) 10 MG Tab 40mg x 4 days; 30mg x 3 days; 20mg x 2 days; 10mg x 1 day 30 Quantity Sufficient 0   • pravastatin (PRAVACHOL) 20 MG Tab Take 2 tablets orally once daily. 180 Tab 0   • oxycodone immediate release (ROXICODONE) 10 MG immediate release tablet Take 1 Tab by mouth every 3 hours as needed (Severe Pain (NRS Pain Scale 7-10; CPOT Pain Scale 6-8)). 75 Tab 0   • warfarin (COUMADIN) 7.5 MG Tab Take 1 Tab by mouth COUMADIN-DAILY. 30 Tab 2   • senna-docusate (PERICOLACE OR SENOKOT S) 8.6-50 MG Tab Take 1 Tab by mouth every day. 30 Tab 0   • aspirin EC (ECOTRIN) 81 MG Tablet Delayed Response Take 81 mg by mouth every day.     • carvedilol (COREG) 3.125 MG Tab Take 1 Tab by mouth 2 times a day, with meals. 60 Tab 1   • Cholecalciferol (VITAMIN D3) 2000 UNITS Tab Take 2,000 Units by mouth every day.     • Aug Betamethasone Dipropionate (DIPROLENE-AF) 0.05 % Cream Apply 1 g to affected area(s) 2 times a day.     • levothyroxine (SYNTHROID) 25 MCG Tab Take 1 Tab by mouth Every  "morning on an empty stomach. 90 Tab 0   • furosemide (LASIX) 40 MG Tab      • KLOR-CON M20 20 MEQ Tab CR      • colchicine (COLCRYS) 0.6 MG Tab 2 tabs by mouth; may repeat x 1 dose if symptoms remain. May repeat dosing the following day as needed for symptoms. 10 Tab 0   • morphine (MS IR) 15 MG tablet Take 1 Tab by mouth every four hours as needed for Severe Pain. 12 Tab 0   • lisinopril (PRINIVIL) 5 MG Tab Take 1 Tab by mouth every day. 30 Tab 1     No current facility-administered medications for this visit.       He  has a past medical history of Shortness of breath (10/14/2014); BMI 37.0-37.9, adult (10/14/2014); Hyperlipidemia (10/14/2014); Right knee pain (10/15/2014); Dry skin (10/15/2014); Accessory skin tags (10/15/2014); Enlarged heart (10/15/2014); Unspecified vitamin D deficiency (10/21/2014); Hypertension; Heart burn; Indigestion; Dental disorder; and NSTEMI (non-ST elevated myocardial infarction) (CMS-[facility]) (2/26/2017). He also has no past medical history of Anxiety or Headache.    Health Maintenance: deferred    ROS  As stated in HPI      Objective:     Height 1.854 m (6' 1\"), weight 122.471 kg (270 lb). Body mass index is 35.63 kg/(m^2).  Physical Exam:  General: Alert, oriented, in no acute distress.  Eye contact is good, speech goal directed, affect calm  Extremities: No furtherTTP on the lateral aspect of the right foot, along the fifth metatarsal. No edema or erythema noted  Ext: no edema, normal color and temperature.   Gait steady.     Assessment and Plan:   Assessment/Plan:  1. Acute gout of right foot, unspecified cause  Improving. He should interested in prevention of gout. Handout provided on high. Foods, risk factors for gout attacks, and management of acute attacks versus long-term treatment. Educated on allopurinol. We will discuss this further when I see him again in a few weeks to establish as a new patient. If gout returns, we will increase prednisone to 60 mg daily for 5 days, but at " this time symptoms seem to be improving.    The total time spent seeing the patient in consultation, and formulating an action plan for this visit was 15 minutes.  Greater than 50% of this time was spent face to face counseling, discussing problems documented above, coordinating care and answering questions by the provider.          Follow up:  Return [facility] 5/25 appointment .    Educated in proper administration of medication(s) ordered today including safety, possible SE, risks, benefits, rationale and alternatives to therapy.   Supportive care, differential diagnoses, and indications for immediate follow-up discussed with patient.    Pathogenesis of diagnosis discussed including typical length and natural progression.    Instructed to return to clinic or nearest emergency department for any change in condition, further concerns, or worsening of symptoms.  Patient states understanding of the plan of care and discharge instructions.      Please note that this dictation was created using voice recognition software. I have made every reasonable attempt to correct obvious errors, but I expect that there are errors of grammar and possibly content that I did not discover before finalizing the note.    Followup: Return [facility] 5/25 appointment . sooner should new symptoms or problems arise.

## 2017-05-11 NOTE — Clinical Note
May 11, 2017    Dear Gagandeep:    ***Gout  Gout is an inflammatory arthritis caused by a buildup of uric acid crystals in the joints. Uric acid is a chemical that is normally present in the blood. When the level of uric acid in the blood is too high it can form crystals that deposit in your joints and tissues. This causes joint redness, soreness, and swelling (inflammation). Repeat attacks are common. Over time, uric acid crystals can form into masses (tophi) near a joint, destroying bone and causing disfigurement. Gout is treatable and often preventable.  CAUSES   The disease begins with elevated levels of uric acid in the blood. Uric acid is produced by your body when it breaks down a naturally found substance called purines. Certain foods you eat, such as meats and fish, contain high amounts of purines. Causes of an elevated uric acid level include:  · Being passed down from parent to child (heredity).  · Diseases that cause increased uric acid production (such as obesity, psoriasis, and certain cancers).  · Excessive alcohol use.  · Diet, especially diets rich in meat and seafood.  · Medicines, including certain cancer-fighting medicines (chemotherapy), water pills (diuretics), and aspirin.  · Chronic kidney disease. The kidneys are no longer able to remove uric acid well.  · Problems with metabolism.  Conditions strongly associated with gout include:  · Obesity.  · High blood pressure.  · High cholesterol.  · Diabetes.  Not everyone with elevated uric acid levels gets gout. It is not understood why some people get gout and others do not. Surgery, joint injury, and eating too much of certain foods are some of the factors that can lead to gout attacks.  SYMPTOMS   · An attack of gout comes on quickly. It causes intense pain with redness, swelling, and warmth in a joint.  · Fever can occur.  · Often, only one joint is involved. Certain joints are more commonly involved:  ¨ Base of the big  toe.  ¨ Knee.  ¨ Ankle.  ¨ Wrist.  ¨ Finger.  Without treatment, an attack usually goes away in a few days to weeks. Between attacks, you usually will not have symptoms, which is different from many other forms of arthritis.  DIAGNOSIS   Your caregiver will suspect gout based on your symptoms and exam. In some cases, tests may be recommended. The tests may include:  · Blood tests.  · Urine tests.  · X-rays.  · Joint fluid exam. This exam requires a needle to remove fluid from the joint (arthrocentesis). Using a microscope, gout is confirmed when uric acid crystals are seen in the joint fluid.  TREATMENT   There are two phases to gout treatment: treating the sudden onset (acute) attack and preventing attacks (prophylaxis).  · Treatment of an Acute Attack.  ¨ Medicines are used. These include anti-inflammatory medicines or steroid medicines.  ¨ An injection of steroid medicine into the affected joint is sometimes necessary.  ¨ The painful joint is rested. Movement can worsen the arthritis.  ¨ You may use warm or cold treatments on painful joints, depending which works best for you.  · Treatment to Prevent Attacks.  ¨ If you suffer from frequent gout attacks, your caregiver may advise preventive medicine. These medicines are started after the acute attack subsides. These medicines either help your kidneys eliminate uric acid from your body or decrease your uric acid production. You may need to stay on these medicines for a very long time.  ¨ The early phase of treatment with preventive medicine can be associated with an increase in acute gout attacks. For this reason, during the first few months of treatment, your caregiver may also advise you to take medicines usually used for acute gout treatment. Be sure you understand your caregiver's directions. Your caregiver may make several adjustments to your medicine dose before these medicines are effective.  ¨ Discuss dietary treatment with your caregiver or dietitian.  Alcohol and drinks high in sugar and fructose and foods such as meat, poultry, and seafood can increase uric acid levels. Your caregiver or dietitian can advise you on drinks and foods that should be limited.  HOME CARE INSTRUCTIONS   · Do not take aspirin to relieve pain. This raises uric acid levels.  · Only take over-the-counter or prescription medicines for pain, discomfort, or fever as directed by your caregiver.  · Rest the joint as much as possible. When in bed, keep sheets and blankets off painful areas.  · Keep the affected joint raised (elevated).  · Apply warm or cold treatments to painful joints. Use of warm or cold treatments depends on which works best for you.  · Use crutches if the painful joint is in your leg.  · Drink enough fluids to keep your urine clear or pale yellow. This helps your body get rid of uric acid. Limit alcohol, sugary drinks, and fructose drinks.  · Follow your dietary instructions. Pay careful attention to the amount of protein you eat. Your daily diet should emphasize fruits, vegetables, whole grains, and fat-free or low-fat milk products. Discuss the use of coffee, vitamin C, and cherries with your caregiver or dietitian. These may be helpful in lowering uric acid levels.  · Maintain a healthy body weight.  SEEK MEDICAL CARE IF:   · You develop diarrhea, vomiting, or any side effects from medicines.  · You do not feel better in 24 hours, or you are getting worse.  SEEK IMMEDIATE MEDICAL CARE IF:   · Your joint becomes suddenly more tender, and you have chills or a fever.  MAKE SURE YOU:   · Understand these instructions.  · Will watch your condition.  · Will get help right away if you are not doing well or get worse.     This information is not intended to replace advice given to you by your health care provider. Make sure you discuss any questions you have with your health care provider.     Document Released: 12/15/2001 Document Revised: 01/08/2016 Document Reviewed:  07/31/2013  Elsevier Interactive Patient Education ©2016 Elsevier Inc.    If you have any questions or concerns, please don't hesitate to call.        Sincerely,        ANGEL Martinez.    Electronically Signed

## 2017-05-15 ENCOUNTER — ANTICOAGULATION MONITORING (OUTPATIENT)
Dept: VASCULAR LAB | Facility: MEDICAL CENTER | Age: 57
End: 2017-05-15

## 2017-05-15 DIAGNOSIS — Z95.3 HISTORY OF HEART VALVE REPLACEMENT WITH BIOPROSTHETIC VALVE: ICD-10-CM

## 2017-05-15 NOTE — PROGRESS NOTES
Anticoagulation Summary as of 5/15/2017     INR goal 2.0-3.0   Selected INR No new INR was available at the time of this encounter.   Maintenance plan 7.5 mg (7.5 mg x 1) every day   Weekly total 52.5 mg   Plan last modified Amrit Rosario, PHARMELIJAH (4/20/2017)   Next INR check 5/18/2017   Target end date 7/10/2017    Indications   History of heart valve replacement with bioprosthetic valve [Z95.4] [Z95.4]         Anticoagulation Episode Summary     INR check location Home Draw    Preferred lab     Send INR reminders to     Comments [facility]      Anticoagulation Care Providers     Provider Role Specialty Phone number    Amrit Rosario PHARMD Responsible          Anticoagulation Patient Findings    Denys is not able to obtain INR until Thursday 5/18/17.  Pt states he is still on 20 mg Prednisone daily for 2 more day, followed by 1 day of 10 mg.  Gave pt a dosing regimen recommendation with current situation.    He denies any s/s of bleeding or bruising.    5/15 - 7.5 mg warfarin  5/16 - 3.75 mg warfarin  5/17 - 3.75 mg warfarin  5/18 - Check INR    Follow up INR in 3 days.    Terrence Zelaya, FARZANAD

## 2017-05-18 ENCOUNTER — ANTICOAGULATION MONITORING (OUTPATIENT)
Dept: VASCULAR LAB | Facility: MEDICAL CENTER | Age: 57
End: 2017-05-18

## 2017-05-18 DIAGNOSIS — Z95.3 HISTORY OF HEART VALVE REPLACEMENT WITH BIOPROSTHETIC VALVE: ICD-10-CM

## 2017-05-18 LAB — INR PPP: 2.4 (ref 2–3.5)

## 2017-05-18 NOTE — PROGRESS NOTES
Anticoagulation Summary as of 5/18/2017     INR goal 2.0-3.0   Selected INR 2.4 (5/18/2017)   Maintenance plan 3.75 mg (7.5 mg x 0.5) on Mon; 7.5 mg (7.5 mg x 1) all other days   Weekly total 48.75 mg   Plan last modified Terrence Zelaya, PHARMD (5/18/2017)   Next INR check 5/25/2017   Target end date 7/10/2017    Indications   History of heart valve replacement with bioprosthetic valve [Z95.4] [Z95.4]         Anticoagulation Episode Summary     INR check location Home Draw    Preferred lab     Send INR reminders to     [facility]      Anticoagulation Care Providers     Provider Role Specialty Phone number    Amrit Rosario, PHARMD Responsible          Anticoagulation Patient Findings    Patient's INR was therapeutic.   Denies any unusual s/s of bleeding, bruising, clotting.  Denies any changes to:   Diet  Pt finished his 10 day course of prednisone.   Confirmed dosing regimen. Pt followed directions that were given to him.   Pt is to begin 7% reduced warfarin dosing regimen.    Follow up in 1 week(s)

## 2017-05-20 ENCOUNTER — OFFICE VISIT (OUTPATIENT)
Dept: URGENT CARE | Facility: PHYSICIAN GROUP | Age: 57
End: 2017-05-20
Payer: COMMERCIAL

## 2017-05-20 VITALS
BODY MASS INDEX: 36.29 KG/M2 | OXYGEN SATURATION: 94 % | WEIGHT: 275 LBS | HEART RATE: 94 BPM | RESPIRATION RATE: 18 BRPM | DIASTOLIC BLOOD PRESSURE: 80 MMHG | SYSTOLIC BLOOD PRESSURE: 130 MMHG | TEMPERATURE: 98 F

## 2017-05-20 DIAGNOSIS — M10.071 ACUTE IDIOPATHIC GOUT OF RIGHT FOOT: ICD-10-CM

## 2017-05-20 PROCEDURE — 99213 OFFICE O/P EST LOW 20 MIN: CPT | Performed by: EMERGENCY MEDICINE

## 2017-05-20 RX ORDER — PREDNISONE 10 MG/1
10 TABLET ORAL DAILY
Qty: 18 TAB | Refills: 0 | Status: SHIPPED | OUTPATIENT
Start: 2017-05-20 | End: 2017-05-25

## 2017-05-20 ASSESSMENT — ENCOUNTER SYMPTOMS
FEVER: 0
CHILLS: 0
VOMITING: 0
COUGH: 0
SENSORY CHANGE: 0
ABDOMINAL PAIN: 0
EYE DISCHARGE: 0
EYE REDNESS: 0
NAUSEA: 0
PALPITATIONS: 0
VISUAL CHANGE: 0
JOINT SWELLING: 0

## 2017-05-20 NOTE — PROGRESS NOTES
Subjective:      Gagandeep Durbin Jr. is a 57 y.o. male who presents with No chief complaint on file.            Foot Problem  This is a new problem. The current episode started more than 1 month ago. The problem occurs intermittently. The problem has been waxing and waning. Pertinent negatives include no abdominal pain, chest pain, chills, coughing, fever, joint swelling (patient swelling and pain are primarily over his proximal fifth metatarsal.), nausea, rash, visual change or vomiting. Associated symptoms comments: Patient is having continued right lateral foot pain recently diagnosed with gout with negative x-rays. Patient states he is not pain using any cane or crutches because he is too busy but has had persistent pain. Has been on steroids most recently but essentially only one course states that colchicine does not work. Currently on warfarin for his heart condition.. The symptoms are aggravated by walking. Treatments tried: previous steroids patient refuses narcotics.       Review of Systems   Constitutional: Negative for fever and chills.   Eyes: Negative for discharge and redness.   Respiratory: Negative for cough.    Cardiovascular: Negative for chest pain and palpitations.   Gastrointestinal: Negative for nausea, vomiting and abdominal pain.   Musculoskeletal: Positive for joint pain. Negative for joint swelling (patient swelling and pain are primarily over his proximal fifth metatarsal.).        Patient is markedly tender over his proximal fifth metatarsal.   Skin: Negative for itching and rash.   Neurological: Negative for sensory change.     Allergies   Allergen Reactions   • Other Misc Rash     metals  Blisters on contact site      History   Sexual Activity   • Sexual Activity:   • Partners: Female     Past Medical History   Diagnosis Date   • Shortness of breath 10/14/2014   • BMI 37.0-37.9, adult 10/14/2014   • Hyperlipidemia 10/14/2014   • Right knee pain 10/15/2014   • Dry skin 10/15/2014   •  Accessory skin tags 10/15/2014   • Enlarged heart 10/15/2014   • Unspecified vitamin D deficiency 10/21/2014   • Hypertension    • Heart burn    • Indigestion    • Dental disorder      partial   • NSTEMI (non-ST elevated myocardial infarction) (CMS-HCC) 2/26/2017     Current Outpatient Prescriptions on File Prior to Visit   Medication Sig Dispense Refill   • predniSONE (DELTASONE) 10 MG Tab 40mg x 4 days; 30mg x 3 days; 20mg x 2 days; 10mg x 1 day 30 Quantity Sufficient 0   • pravastatin (PRAVACHOL) 20 MG Tab Take 2 tablets orally once daily. 180 Tab 0   • warfarin (COUMADIN) 7.5 MG Tab Take 1 Tab by mouth COUMADIN-DAILY. 30 Tab 2   • aspirin EC (ECOTRIN) 81 MG Tablet Delayed Response Take 81 mg by mouth every day.     • carvedilol (COREG) 3.125 MG Tab Take 1 Tab by mouth 2 times a day, with meals. 60 Tab 1   • Cholecalciferol (VITAMIN D3) 2000 UNITS Tab Take 2,000 Units by mouth every day.     • Aug Betamethasone Dipropionate (DIPROLENE-AF) 0.05 % Cream Apply 1 g to affected area(s) 2 times a day.     • levothyroxine (SYNTHROID) 25 MCG Tab Take 1 Tab by mouth Every morning on an empty stomach. 90 Tab 0   • furosemide (LASIX) 40 MG Tab      • KLOR-CON M20 20 MEQ Tab CR      • colchicine (COLCRYS) 0.6 MG Tab 2 tabs by mouth; may repeat x 1 dose if symptoms remain. May repeat dosing the following day as needed for symptoms. 10 Tab 0   • oxycodone immediate release (ROXICODONE) 10 MG immediate release tablet Take 1 Tab by mouth every 3 hours as needed (Severe Pain (NRS Pain Scale 7-10; CPOT Pain Scale 6-8)). 75 Tab 0   • senna-docusate (PERICOLACE OR SENOKOT S) 8.6-50 MG Tab Take 1 Tab by mouth every day. 30 Tab 0   • morphine (MS IR) 15 MG tablet Take 1 Tab by mouth every four hours as needed for Severe Pain. 12 Tab 0   • lisinopril (PRINIVIL) 5 MG Tab Take 1 Tab by mouth every day. 30 Tab 1     No current facility-administered medications on file prior to visit.     Family History   Problem Relation Age of Onset   •  Diabetes Mother    • Cancer Father    • Heart Disease Father    • Heart Failure Father         Objective:     Blood pressure 130/80, pulse 94, temperature 36.7 °C (98 °F), resp. rate 18, weight 124.739 kg (275 lb), SpO2 94 %.  Physical Exam   Constitutional: He appears well-developed and well-nourished. No distress.   HENT:   Head: Normocephalic and atraumatic.   Eyes: Right eye exhibits no discharge.   Cardiovascular: Normal rate.    Pulmonary/Chest: Effort normal.   Abdominal: He exhibits no distension.   Musculoskeletal: Normal range of motion. He exhibits no tenderness.   Patient's very tender over his right proximal fifth metatarsal there is no rash no ecchymosis no warmth.   Skin: Skin is warm. He is not diaphoretic.   Psychiatric: He has a normal mood and affect.   Vitals reviewed.              Assessment/Plan:     Diagnosis acute gout flare    I am recommending the patient initiate/ continue hydration efforts including the use of a vaporizer/humidifier/ netti pot.  In addition the patient will initiate the prescribed prescription medication/s: Prednisone 60, 40, 40 20, 20 and off. If the patient's condition exacerbates with worsening dysphagia, shortness of breath, uncontrolled fever, headache or chest pressure he/she will return immediately to the urgent care or go to  the emergency department for further evaluation. A stress that the patient should be using crutches to take the weightbearing office right foot.    ELIJAH Salinas

## 2017-05-25 ENCOUNTER — ANTICOAGULATION MONITORING (OUTPATIENT)
Dept: VASCULAR LAB | Facility: MEDICAL CENTER | Age: 57
End: 2017-05-25

## 2017-05-25 ENCOUNTER — OFFICE VISIT (OUTPATIENT)
Dept: MEDICAL GROUP | Facility: PHYSICIAN GROUP | Age: 57
End: 2017-05-25
Payer: COMMERCIAL

## 2017-05-25 VITALS
OXYGEN SATURATION: 93 % | SYSTOLIC BLOOD PRESSURE: 128 MMHG | RESPIRATION RATE: 16 BRPM | TEMPERATURE: 99.5 F | BODY MASS INDEX: 36.58 KG/M2 | HEART RATE: 67 BPM | DIASTOLIC BLOOD PRESSURE: 70 MMHG | WEIGHT: 276 LBS | HEIGHT: 73 IN

## 2017-05-25 DIAGNOSIS — Z95.3 HISTORY OF HEART VALVE REPLACEMENT WITH BIOPROSTHETIC VALVE: ICD-10-CM

## 2017-05-25 DIAGNOSIS — M79.671 RIGHT FOOT PAIN: ICD-10-CM

## 2017-05-25 DIAGNOSIS — Z76.89 ENCOUNTER TO ESTABLISH CARE: ICD-10-CM

## 2017-05-25 DIAGNOSIS — Z95.2 S/P AVR (AORTIC VALVE REPLACEMENT): ICD-10-CM

## 2017-05-25 DIAGNOSIS — E03.9 HYPOTHYROIDISM, UNSPECIFIED TYPE: ICD-10-CM

## 2017-05-25 DIAGNOSIS — R73.02 GLUCOSE INTOLERANCE (IMPAIRED GLUCOSE TOLERANCE): ICD-10-CM

## 2017-05-25 DIAGNOSIS — I10 ESSENTIAL HYPERTENSION, BENIGN: ICD-10-CM

## 2017-05-25 DIAGNOSIS — I49.3 PVC (PREMATURE VENTRICULAR CONTRACTION): ICD-10-CM

## 2017-05-25 PROBLEM — I35.0 AORTIC STENOSIS DUE TO BICUSPID AORTIC VALVE: Status: RESOLVED | Noted: 2017-03-17 | Resolved: 2017-05-25

## 2017-05-25 PROBLEM — R07.9 CHEST PAIN, RULE OUT ACUTE MYOCARDIAL INFARCTION: Status: RESOLVED | Noted: 2017-03-20 | Resolved: 2017-05-25

## 2017-05-25 PROBLEM — Q23.81 AORTIC STENOSIS DUE TO BICUSPID AORTIC VALVE: Status: RESOLVED | Noted: 2017-03-17 | Resolved: 2017-05-25

## 2017-05-25 PROBLEM — Z23 NEED FOR TDAP VACCINATION: Status: RESOLVED | Noted: 2017-02-10 | Resolved: 2017-05-25

## 2017-05-25 PROBLEM — I50.9 ACUTE ON CHRONIC HEART FAILURE (HCC): Status: RESOLVED | Noted: 2017-03-20 | Resolved: 2017-05-25

## 2017-05-25 PROBLEM — N28.9 RENAL INSUFFICIENCY: Status: RESOLVED | Noted: 2017-03-21 | Resolved: 2017-05-25

## 2017-05-25 PROBLEM — R00.1 CHRONIC SINUS BRADYCARDIA: Status: RESOLVED | Noted: 2017-02-26 | Resolved: 2017-05-25

## 2017-05-25 PROBLEM — Q23.0 AORTIC STENOSIS DUE TO BICUSPID AORTIC VALVE: Status: RESOLVED | Noted: 2017-03-17 | Resolved: 2017-05-25

## 2017-05-25 PROBLEM — Q23.1 AORTIC STENOSIS DUE TO BICUSPID AORTIC VALVE: Status: RESOLVED | Noted: 2017-03-17 | Resolved: 2017-05-25

## 2017-05-25 LAB — INR PPP: 3.6 (ref 2–3.5)

## 2017-05-25 PROCEDURE — 99214 OFFICE O/P EST MOD 30 MIN: CPT | Performed by: NURSE PRACTITIONER

## 2017-05-25 RX ORDER — HYDROCODONE BITARTRATE AND ACETAMINOPHEN 5; 325 MG/1; MG/1
1 TABLET ORAL EVERY 8 HOURS PRN
Qty: 20 TAB | Refills: 0 | Status: SHIPPED | OUTPATIENT
Start: 2017-05-25 | End: 2017-12-14

## 2017-05-25 RX ORDER — PRAVASTATIN SODIUM 40 MG
40 TABLET ORAL
Qty: 90 TAB | Refills: 3 | Status: SHIPPED | OUTPATIENT
Start: 2017-05-25 | End: 2022-09-09

## 2017-05-25 RX ORDER — LEVOTHYROXINE SODIUM 0.03 MG/1
25 TABLET ORAL
Qty: 90 TAB | Refills: 3 | Status: SHIPPED | OUTPATIENT
Start: 2017-05-25

## 2017-05-25 RX ORDER — CARVEDILOL 3.12 MG/1
3.12 TABLET ORAL 2 TIMES DAILY WITH MEALS
Qty: 180 TAB | Refills: 1 | Status: SHIPPED | OUTPATIENT
Start: 2017-05-25 | End: 2017-12-14 | Stop reason: SDUPTHER

## 2017-05-25 RX ORDER — LISINOPRIL 10 MG/1
10 TABLET ORAL DAILY
Qty: 90 TAB | Refills: 3 | Status: SHIPPED | OUTPATIENT
Start: 2017-05-25 | End: 2017-12-14

## 2017-05-25 NOTE — ASSESSMENT & PLAN NOTE
Most recent A1C 5.8 March 2017. Mother has severe DM. Denies polyuria, polydipsia, or polyphagia.

## 2017-05-25 NOTE — MR AVS SNAPSHOT
"        Gagandeep Durbin    2017 8:20 AM   Office Visit   MRN: [id number]    Department:  [facility]   Dept Phone:  [phone]    Description:  Male : 1960   Provider:  MERCY Martinez           Reason for Visit     Establish Care           Allergies as of 2017     Allergen Noted Reactions    Other Misc 04/10/2017   Rash    metals  Blisters on contact site       You were diagnosed with     Encounter to establish care   [280544]       Essential hypertension, benign   [401.1.ICD-9-CM]       Hypothyroidism, unspecified type   [8567909]       PVC (premature ventricular contraction)   [588528]       S/P AVR (aortic valve replacement)   [526994]       Right foot pain   [804660]       Glucose intolerance (impaired glucose tolerance)   [929724]         Vital Signs     Blood Pressure Pulse Temperature Respirations Height Weight    128/70 mmHg 67 37.5 °C (99.5 °F) 16 1.854 m (6' 1\") 125.193 kg (276 lb)    Body Mass Index Oxygen Saturation Smoking Status             36.42 kg/m2 93% Former Smoker         Basic Information     Date Of Birth Sex Race Ethnicity Preferred Language    1960 Male White Non- English      Your appointments     May 26, 2017  2:15 PM   ECHO with ECHO [facility], [facility] EXAM 10   ECHOCARDIOLOGY [facility] ([facility])    [address] [address]   [phone]           No prep            May 31, 2017  3:00 PM   FOLLOW UP with Steve Ruelas M.D.   [facility] (--)    [address], [facility] [address]  [facility] [id number] [phone]              Problem List              ICD-10-CM Priority Class Noted - Resolved    Vitamin D deficiency E55.9   10/21/2014 - Present    Hypothyroid E03.9 Low  2016 - Present    Alcoholism (CMS-HCC) F10.20   2017 - Present    Arthritis M19.90   2017 - Present    History of non-ST elevation myocardial infarction (NSTEMI) I25.2   2017 - Present    Cardiomyopathy (CMS-HCC) I42.9 Medium  3/20/2017 - " Present    PVC (premature ventricular contraction) I49.3 Medium  3/20/2017 - Present    Chronic combined systolic and diastolic CHF (congestive heart failure) (CMS-[facility]) I50.42 Medium  3/21/2017 - Present    Obesity (BMI 30-39.9) E66.9   3/28/2017 - Present    Chronic anticoagulation Z79.01   4/20/2017 - Present    History of heart valve replacement with bioprosthetic valve [Z95.4] Z95.4   4/20/2017 - Present    S/P AVR (aortic valve replacement) Z95.2   4/28/2017 - Present    Essential hypertension, benign I10   4/28/2017 - Present    Dyslipidemia E78.5   4/28/2017 - Present    Right foot pain M79.671   5/25/2017 - Present    Glucose intolerance (impaired glucose tolerance) R73.02   5/25/2017 - Present      Health Maintenance        Date Due Completion Dates    IMM PNEUMOCOCCAL 19-64 (ADULT) MEDIUM RISK SERIES (1 of 1 - PPSV23) 3/25/1977 ---    COLONOSCOPY 10/1/2021 10/1/2016 (N/S), 3/1/2013 (N/S)    Override on 10/1/2016: (N/S)    Override on 3/1/2013: (N/S)    IMM DTaP/Tdap/Td Vaccine (2 - Td) 2/10/2027 2/10/2017            Current Immunizations     Influenza Vaccine Quad Inj (Pf) 10/15/2016    Tdap Vaccine 2/10/2017  8:30 AM      Below and/or attached are the medications your provider expects you to take. Review all of your home medications and newly ordered medications with your provider and/or pharmacist. Follow medication instructions as directed by your provider and/or pharmacist. Please keep your medication list with you and share with your provider. Update the information when medications are discontinued, doses are changed, or new medications (including over-the-counter products) are added; and carry medication information at all times in the event of emergency situations     Allergies:  OTHER MISC - Rash               Medications  Valid as of: May 25, 2017 -  9:18 AM    Generic Name Brand Name Tablet Size Instructions for use    Aspirin (Tablet Delayed Response) ECOTRIN 81 MG Take 81 mg by mouth every  day.        Betamethasone Dipropionate Aug (Cream) DIPROLENE-AF 0.05 % Apply 1 g to affected area(s) 2 times a day.        Carvedilol (Tab) COREG 3.125 MG Take 1 Tab by mouth 2 times a day, with meals.        Cholecalciferol (Tab) Vitamin D3 2000 UNITS Take 2,000 Units by mouth every day.        Hydrocodone-Acetaminophen (Tab) NORCO 5-325 MG Take 1 Tab by mouth every 8 hours as needed.        Levothyroxine Sodium (Tab) SYNTHROID 25 MCG Take 1 Tab by mouth Every morning on an empty stomach.        Lisinopril (Tab) PRINIVIL 10 MG Take 1 Tab by mouth every day.        Potassium Chloride Marlin CR (Tab CR) KLOR-CON M20 20 MEQ         Pravastatin Sodium (Tab) PRAVACHOL 40 MG Take 1 Tab by mouth every bedtime.        Sennosides-Docusate Sodium (Tab) PERICOLACE or SENOKOT S 8.6-50 MG Take 1 Tab by mouth every day.        Warfarin Sodium (Tab) COUMADIN 7.5 MG Take 1 Tab by mouth COUMADIN-DAILY.        .                 Medicines prescribed today were sent to:     [facility] [address] [place], [place] [address] [address] [address]    Phone: [phone] Fax: [phone]    Open 24 Hours?: No      Medication refill instructions:       If your prescription bottle indicates you have medication refills left, it is not necessary to call your provider’s office. Please contact your pharmacy and they will refill your medication.    If your prescription bottle indicates you do not have any refills left, you may request refills at any time through one of the following ways: The online [employer] system (except Urgent Care), by calling your provider’s office, or by asking your pharmacy to contact your provider’s office with a refill request. Medication refills are processed only during regular business hours and may not be available until the next business day. Your provider may request additional information or to have a follow-up visit with you prior to refilling your medication.   *Please Note:  Medication refills are assigned a new Rx number when refilled electronically. Your pharmacy may indicate that no refills were authorized even though a new prescription for the same medication is available at the pharmacy. Please request the medicine by name with the pharmacy before contacting your provider for a refill.        Your To Do List     Future Labs/Procedures Complete By Expires    DX-FOOT-COMPLETE 3+ RIGHT  As directed 11/25/2017    URIC ACID  As directed 5/26/2018    WESTERGREN SED RATE  As directed 5/25/2018         Gient Access Code: [email or web address]  Expires: 6/4/2017 10:32 AM    Gient  A secure, online tool to manage your health information     [employer]’s Gient® is a secure, online tool that connects you to your personalized health information from the privacy of your home -- day or night - making it very easy for you to manage your healthcare. Once the activation process is completed, you can even access your medical information using the Gient katiana, which is available for free in the [employer] Katiana store or Google Play store.     Gient provides the following levels of access (as shown below):   My Chart Features   [facility] Primary Care Doctor [facility]  Specialists [facility]  Urgent  Care Non-[facility]  Primary Care  Doctor   Email your healthcare team securely and privately 24/7 X X X    Manage appointments: schedule your next appointment; view details of past/upcoming appointments X      Request prescription refills. X      View recent personal medical records, including lab and immunizations X X X X   View health record, including health history, allergies, medications X X X X   Read reports about your outpatient visits, procedures, consult and ER notes X X X X   See your discharge summary, which is a recap of your hospital and/or ER visit that includes your diagnosis, lab results, and care plan. X X       How to register for Gient:  1. Go to  [email or web address].  2. Click on the Sign  Up Now box, which takes you to the New Member Sign Up page. You will need to provide the following information:  a. Enter your [employer] Access Code exactly as it appears at the top of this page. (You will not need to use this code after you’ve completed the sign-up process. If you do not sign up before the expiration date, you must request a new code.)   b. Enter your date of birth.   c. Enter your home email address.   d. Click Submit, and follow the next screen’s instructions.  3. Create a Travefy ID. This will be your Travefy login ID and cannot be changed, so think of one that is secure and easy to remember.  4. Create a Travefy password. You can change your password at any time.  5. Enter your Password Reset Question and Answer. This can be used at a later time if you forget your password.   6. Enter your e-mail address. This allows you to receive e-mail notifications when new information is available in Travefy.  7. Click Sign Up. You can now view your health information.    For assistance activating your Travefy account, call [phone]

## 2017-05-25 NOTE — ASSESSMENT & PLAN NOTE
Reports that he is doing very well since his procedure. States all of his other symptoms including fatigue, indigestion, insomnia, and overall malaise have resolved entirely. Denies any dyspnea, fatigue, or near syncope

## 2017-05-25 NOTE — ASSESSMENT & PLAN NOTE
Has been monitoring bp at home. Stopped bp meds 4/25. BP remained at goal for a few week, but now has increased to 140s-170s. The lasix was stopped, and he believes this is when the bp increased.

## 2017-05-25 NOTE — PROGRESS NOTES
Anticoagulation Summary as of 5/25/2017     INR goal 2.0-3.0   Selected INR 3.6! (5/25/2017)   Maintenance plan 3.75 mg (7.5 mg x 0.5) on Mon; 7.5 mg (7.5 mg x 1) all other days   Weekly total 48.75 mg   Plan last modified Terrence Zelaya, PHARMD (5/18/2017)   Next INR check 5/30/2017   Target end date 7/10/2017    Indications   History of heart valve replacement with bioprosthetic valve [Z95.4] [Z95.4]         Anticoagulation Episode Summary     INR check location Home Draw    Preferred lab     Send INR reminders to     [facility]      Anticoagulation Care Providers     Provider Role Specialty Phone number    Amrit Rosario, PHARMD Responsible          Anticoagulation Patient Findings    Left voicemail message to report a supra therapeutic INR of 3.6.   Pt just completed a prednisone taper.  Will reduce tonight's dose to 3.75mg then continue current dosing regimen.  Pt to continue with current warfarin dosing regimen. Requested pt contact the clinic for any s/s of unusual bleeding, bruising, clotting or any changes to diet or medication. FU 4 days.  Ryanne Mulligan, PHARMD

## 2017-05-25 NOTE — ASSESSMENT & PLAN NOTE
Established problem since March 25. He was treated for gout with NSAID and failed. Then treated with prednisone ×2 courses as the first one worked well, but symptoms returned as soon as prednisone was stopped. He has been off of prednisone now for 2 days and reports that his pain is resolved at this time. Initial workup had included an elevated uric acid level of 8.9, but no sedimentation rate.

## 2017-05-26 ENCOUNTER — HOSPITAL ENCOUNTER (OUTPATIENT)
Dept: CARDIOLOGY | Facility: MEDICAL CENTER | Age: 57
End: 2017-05-26
Attending: INTERNAL MEDICINE
Payer: COMMERCIAL

## 2017-05-26 DIAGNOSIS — Z95.2 S/P AVR (AORTIC VALVE REPLACEMENT): ICD-10-CM

## 2017-05-26 PROCEDURE — 93306 TTE W/DOPPLER COMPLETE: CPT | Mod: 26 | Performed by: INTERNAL MEDICINE

## 2017-05-26 PROCEDURE — 93306 TTE W/DOPPLER COMPLETE: CPT

## 2017-05-27 ENCOUNTER — TELEPHONE (OUTPATIENT)
Dept: MEDICAL GROUP | Facility: PHYSICIAN GROUP | Age: 57
End: 2017-05-27

## 2017-05-27 LAB
LV EJECT FRACT  99904: 65
LV EJECT FRACT MOD 2C 99903: 52.05
LV EJECT FRACT MOD 4C 99902: 54.18
LV EJECT FRACT MOD BP 99901: 54.93

## 2017-05-27 NOTE — TELEPHONE ENCOUNTER
Please call [facility]lyn to request the most recent nocturnal oxygen study done. We are trying to find the one that was most recently done without oxygen to see if we can discontinue the oxygen entirely. Patient reports it was [facility] in [facility]

## 2017-05-27 NOTE — ASSESSMENT & PLAN NOTE
Long-standing problem currently managed with levothyroxine 25 µg daily   Ref. Range 12/2/2016 06:40 2/3/2017 06:07 3/17/2017 11:30   TSH Latest Ref Range: 0.300-3.700 uIU/mL 5.370 (H) 4.310 (H) 2.720   Free T-4 Latest Ref Range: 0.53-1.43 ng/dL 0.90 0.90 0.79

## 2017-05-27 NOTE — PROGRESS NOTES
Subjective:     Chief Complaint   Patient presents with   • Establish Care       HPI  Gagandeep Durbin Jr. is a 57 y.o. male here today to establish care. He is with his wife today. His main concern is his right foot pain    Essential hypertension, benign  Has been monitoring bp at home. Stopped bp meds 4/25. BP remained at goal for a few week, but now has increased to 140s-170s. The lasix was stopped, and he believes this is when the bp increased.     PVC (premature ventricular contraction)  Denies any symptoms of palpitations or heart racing.     S/P AVR (aortic valve replacement)  Reports that he is doing very well since his procedure. States all of his other symptoms including fatigue, indigestion, insomnia, and overall malaise have resolved entirely. Denies any dyspnea, fatigue, or near syncope      Right foot pain  Established problem since March 25. He was treated for gout with NSAID and failed. Then treated with prednisone ×2 courses as the first one worked well, but symptoms returned as soon as prednisone was stopped. He has been off of prednisone now for 2 days and reports that his pain is resolved at this time. Initial workup had included an elevated uric acid level of 8.9, but no sedimentation rate.     Glucose intolerance (impaired glucose tolerance)  Most recent A1C 5.8 March 2017. Mother has severe DM. Denies polyuria, polydipsia, or polyphagia.     Hypothyroid  Long-standing problem currently managed with levothyroxine 25 µg daily   Ref. Range 12/2/2016 06:40 2/3/2017 06:07 3/17/2017 11:30   TSH Latest Ref Range: 0.300-3.700 uIU/mL 5.370 (H) 4.310 (H) 2.720   Free T-4 Latest Ref Range: 0.53-1.43 ng/dL 0.90 0.90 0.79      Diagnoses of Encounter to establish care, Essential hypertension, benign, Hypothyroidism, unspecified type, PVC (premature ventricular contraction), S/P AVR (aortic valve replacement), Right foot pain, and Glucose intolerance (impaired glucose tolerance) were pertinent to this  visit.    Allergies: Other misc  Current medicines (including changes today)  Current Outpatient Prescriptions   Medication Sig Dispense Refill   • levothyroxine (SYNTHROID) 25 MCG Tab Take 1 Tab by mouth Every morning on an empty stomach. 90 Tab 3   • carvedilol (COREG) 3.125 MG Tab Take 1 Tab by mouth 2 times a day, with meals. 180 Tab 1   • pravastatin (PRAVACHOL) 40 MG tablet Take 1 Tab by mouth every bedtime. 90 Tab 3   • lisinopril (PRINIVIL) 10 MG Tab Take 1 Tab by mouth every day. 90 Tab 3   • hydrocodone-acetaminophen (NORCO) 5-325 MG Tab per tablet Take 1 Tab by mouth every 8 hours as needed. 20 Tab 0   • KLOR-CON M20 20 MEQ Tab CR      • warfarin (COUMADIN) 7.5 MG Tab Take 1 Tab by mouth COUMADIN-DAILY. 30 Tab 2   • senna-docusate (PERICOLACE OR SENOKOT S) 8.6-50 MG Tab Take 1 Tab by mouth every day. 30 Tab 0   • aspirin EC (ECOTRIN) 81 MG Tablet Delayed Response Take 81 mg by mouth every day.     • Cholecalciferol (VITAMIN D3) 2000 UNITS Tab Take 2,000 Units by mouth every day.     • Aug Betamethasone Dipropionate (DIPROLENE-AF) 0.05 % Cream Apply 1 g to affected area(s) 2 times a day.       No current facility-administered medications for this visit.       He  has a past medical history of Shortness of breath (10/14/2014); BMI 37.0-37.9, adult (10/14/2014); Hyperlipidemia (10/14/2014); Right knee pain (10/15/2014); Dry skin (10/15/2014); Accessory skin tags (10/15/2014); Enlarged heart (10/15/2014); Unspecified vitamin D deficiency (10/21/2014); Hypertension; Heart burn; Indigestion; Dental disorder; and NSTEMI (non-ST elevated myocardial infarction) (CMS-HCC) (2/26/2017). He also has no past medical history of Anxiety or Headache.      ROS  As stated in HPI and additionally  Head/Neck: No headache, dizziness  Eyes: No change in vision  Lungs: No cough, sob, dyspnea  Cardiac: No chest pain, palpitations, syncope or near syncope, FISCHER, or LE edema     Objective:     Blood pressure 128/70, pulse 67,  "temperature 37.5 °C (99.5 °F), resp. rate 16, height 1.854 m (6' 1\"), weight 125.193 kg (276 lb), SpO2 93 %. Body mass index is 36.42 kg/(m^2).  Physical Exam:  General: Alert, oriented, in no acute distress.  Eye contact is good, speech goal directed, affect calm  HEENT: conjunctiva non-injected, sclera non-icteric, EOMs intact. Gross hearing intact.  Oral mucous membranes pink and moist with no lesions. Oropharynx without erythema, or exudate.   Neck: No adenopathy or masses in the cervical or supraclavicular regions. No thyromegaly  Lungs: unlabored. clear to auscultation bilaterally with good excursion.  CV: regular rate and rhythm. No murmurs. No carotid bruits.   Ext: no edema, normal color and temperature.   MSK: right foot: No edema or ecchymosis. No bone tenderness at base of 5th metatarsal or navicula. TTP over lateral side of foot and dorsal aspect of foot. Able to bear 100% weight. Neurovascularly intact.   Gait steady.     Assessment and Plan:   Assessment/Plan:  1. Encounter to establish care    2. Essential hypertension, benign  Not controlled. Blood pressure increasing since furosemide DC'd. Restart lisinopril 10 mg daily    3. Hypothyroidism, unspecified type  Stable on current dose. TSH due in 4 months  - levothyroxine (SYNTHROID) 25 MCG Tab; Take 1 Tab by mouth Every morning on an empty stomach.  Dispense: 90 Tab; Refill: 3    4. PVC (premature ventricular contraction)  Stable without symptoms    5. S/P AVR (aortic valve replacement)  Stable. Continue follow-up with cardiology    6. Right foot pain  Stable at this time, but I am not convinced that it is gout as it has recurred multiple times with a short amount of time. I've given patient x-ray and blood work to do if symptoms return. We will rule out or in gout and obtain an x-ray to look for any other issues that could be causing these symptoms. He may use Norco for pain if this occurs this is a temporary Norco therefore we will not get pain " contract at this time.  - URIC ACID; Future  - WESTERGREN SED RATE; Future  - DX-FOOT-COMPLETE 3+ RIGHT; Future  - hydrocodone-acetaminophen (NORCO) 5-325 MG Tab per tablet; Take 1 Tab by mouth every 8 hours as needed.  Dispense: 20 Tab; Refill: 0    7. Glucose intolerance (impaired glucose tolerance)  Stable. Discussed nutrition to prevent this from worsening       Follow up:  Return in about 6 months (around 11/25/2017).    Educated in proper administration of medication(s) ordered today including safety, possible SE, risks, benefits, rationale and alternatives to therapy.   Supportive care, differential diagnoses, and indications for immediate follow-up discussed with patient.    Pathogenesis of diagnosis discussed including typical length and natural progression.    Instructed to return to clinic or nearest emergency department for any change in condition, further concerns, or worsening of symptoms.  Patient states understanding of the plan of care and discharge instructions.      Please note that this dictation was created using voice recognition software. I have made every reasonable attempt to correct obvious errors, but I expect that there are errors of grammar and possibly content that I did not discover before finalizing the note.    Followup: Return in about 6 months (around 11/25/2017). sooner should new symptoms or problems arise.

## 2017-05-30 ENCOUNTER — TELEPHONE (OUTPATIENT)
Dept: CARDIOLOGY | Facility: MEDICAL CENTER | Age: 57
End: 2017-05-30

## 2017-05-30 NOTE — TELEPHONE ENCOUNTER
Pt has f/u elva/ HOWIE tomorrow 5/31.     ----- Message from Steve Ruelas M.D. sent at 5/27/2017  5:46 PM PDT -----  Please call patient with unremarkable echocardiogram results showing normally functioning surgical AVR valve.    Garth.  GERTRUDIS

## 2017-05-31 ENCOUNTER — OFFICE VISIT (OUTPATIENT)
Dept: CARDIOLOGY | Facility: MEDICAL CENTER | Age: 57
End: 2017-05-31
Payer: COMMERCIAL

## 2017-05-31 VITALS
HEIGHT: 73 IN | HEART RATE: 74 BPM | WEIGHT: 280 LBS | SYSTOLIC BLOOD PRESSURE: 138 MMHG | OXYGEN SATURATION: 95 % | BODY MASS INDEX: 37.11 KG/M2 | DIASTOLIC BLOOD PRESSURE: 78 MMHG

## 2017-05-31 DIAGNOSIS — Z95.2 S/P AVR (AORTIC VALVE REPLACEMENT): ICD-10-CM

## 2017-05-31 DIAGNOSIS — I10 ESSENTIAL HYPERTENSION, BENIGN: ICD-10-CM

## 2017-05-31 DIAGNOSIS — E78.5 DYSLIPIDEMIA: ICD-10-CM

## 2017-05-31 PROBLEM — I42.9 CARDIOMYOPATHY (HCC): Status: RESOLVED | Noted: 2017-03-20 | Resolved: 2017-05-31

## 2017-05-31 PROCEDURE — 99214 OFFICE O/P EST MOD 30 MIN: CPT | Performed by: INTERNAL MEDICINE

## 2017-05-31 ASSESSMENT — ENCOUNTER SYMPTOMS
ABDOMINAL PAIN: 0
PND: 0
ORTHOPNEA: 0
FEVER: 0
INSOMNIA: 0
BLURRED VISION: 0
MYALGIAS: 0
CHILLS: 0
PALPITATIONS: 0
SHORTNESS OF BREATH: 1
LOSS OF CONSCIOUSNESS: 0
DIZZINESS: 0

## 2017-05-31 NOTE — PROGRESS NOTES
Subjective:   Gagandeep Durbin Jr. is a 57 y.o. male who presents today for follow up of .    Since the patient's last visit on 04/28/17, he has been doing well clinically. He denies chest pain, shortness of breath, palpitations, nausea/vomiting or diaphoresis.      Past Medical History   Diagnosis Date   • Shortness of breath 10/14/2014   • BMI 37.0-37.9, adult 10/14/2014   • Hyperlipidemia 10/14/2014   • Right knee pain 10/15/2014   • Dry skin 10/15/2014   • Accessory skin tags 10/15/2014   • Enlarged heart 10/15/2014   • Unspecified vitamin D deficiency 10/21/2014   • Hypertension    • Heart burn    • Indigestion    • Dental disorder      partial   • NSTEMI (non-ST elevated myocardial infarction) (CMS-HCC) 2/26/2017     Past Surgical History   Procedure Laterality Date   • Recovery  1/28/2015     Performed by [facility] at SURGERY SAME DAY [facility] ORS   • Aortic valve replacement  4/10/2017     Procedure: AORTIC VALVE REPLACEMENT;  Surgeon: Aditya Carter M.D.;  Location: SURGERY [facility];  Service:    • Reese  4/10/2017     Procedure: REESE;  Surgeon: Aditya Carter M.D.;  Location: SURGERY [facility];  Service:      Family History   Problem Relation Age of Onset   • Diabetes Mother    • Cancer Father      prostate   • Heart Disease Father    • Heart Failure Father    • Colon Cancer Father      History   Smoking status   • Former Smoker -- 1.00 packs/day for 33 years   • Quit date: 05/14/2013   Smokeless tobacco   • Never Used     Allergies   Allergen Reactions   • Other Misc Rash     metals  Blisters on contact site      Medications reviewed.    Outpatient Encounter Prescriptions as of 5/31/2017   Medication Sig Dispense Refill   • levothyroxine (SYNTHROID) 25 MCG Tab Take 1 Tab by mouth Every morning on an empty stomach. 90 Tab 3   • carvedilol (COREG) 3.125 MG Tab Take 1 Tab by mouth 2 times a day, with meals. 180 Tab 1   • pravastatin (PRAVACHOL) 40 MG tablet Take 1 Tab by mouth every bedtime.  "90 Tab 3   • lisinopril (PRINIVIL) 10 MG Tab Take 1 Tab by mouth every day. 90 Tab 3   • warfarin (COUMADIN) 7.5 MG Tab Take 1 Tab by mouth COUMADIN-DAILY. 30 Tab 2   • aspirin EC (ECOTRIN) 81 MG Tablet Delayed Response Take 81 mg by mouth every day.     • Cholecalciferol (VITAMIN D3) 2000 UNITS Tab Take 2,000 Units by mouth every day.     • Aug Betamethasone Dipropionate (DIPROLENE-AF) 0.05 % Cream Apply 1 g to affected area(s) 2 times a day.     • hydrocodone-acetaminophen (NORCO) 5-325 MG Tab per tablet Take 1 Tab by mouth every 8 hours as needed. 20 Tab 0   • KLOR-CON M20 20 MEQ Tab CR      • senna-docusate (PERICOLACE OR SENOKOT S) 8.6-50 MG Tab Take 1 Tab by mouth every day. 30 Tab 0     No facility-administered encounter medications on file as of 5/31/2017.     Review of Systems   Constitutional: Negative for fever and chills.   HENT: Negative for congestion.    Eyes: Negative for blurred vision.   Respiratory: Positive for shortness of breath.    Cardiovascular: Negative for chest pain, palpitations, orthopnea, leg swelling and PND.   Gastrointestinal: Negative for abdominal pain.   Genitourinary: Negative for dysuria.   Musculoskeletal: Negative for myalgias and joint pain.   Skin: Negative for rash.   Neurological: Negative for dizziness and loss of consciousness.   Psychiatric/Behavioral: The patient does not have insomnia.         Objective:   /78 mmHg  Pulse 74  Ht 1.854 m (6' 0.99\")  Wt 127.007 kg (280 lb)  BMI 36.95 kg/m2  SpO2 95%    Physical Exam   Constitutional: He is oriented to person, place, and time. He appears well-developed and well-nourished.   HENT:   Head: Normocephalic and atraumatic.   Eyes: Conjunctivae are normal. Pupils are equal, round, and reactive to light.   Neck: Normal range of motion. Neck supple.   Cardiovascular: Normal rate and regular rhythm.    Pulmonary/Chest: Effort normal and breath sounds normal.   Abdominal: Soft. Bowel sounds are normal. "   Musculoskeletal: Normal range of motion. He exhibits no edema.   Neurological: He is alert and oriented to person, place, and time.   Skin: Skin is warm and dry.   Psychiatric: He has a normal mood and affect.     CARDIAC STUDIES/PROCEDURES:    CARDIAC CATHETERIZATION CONCLUSIONS by Dr. Ibrahim (03/19/17)  1.  At least moderate aortic stenosis with a simultaneous mean gradient of 24    mmHg and calculated aortic valve area of 0.71.  2.  Reduced left ventricular ejection fraction, estimated at 40%.  3.  No significant epicardial coronary artery disease.  4.  Reduced thermodilution cardiac output of 2.53 with an index of 1.02.  5.  Left ventricular end-diastolic pressure 28.    CAROTID ULTRASOUND (03/21/17)  1.  Minimal plaque in the bilateral carotid bulb with less than 50% stenosis.  2.  Normal bilateral subclavian and vertebral arterial exam.    DOBUTAMINE STRESS ECHOCARDIOGRAM (03/20/17)  Augmented LV function with dobutamine stress. Severe AS with mean gradient of 55 mm Hg with   dobutamine. Endocardium not well visualized but no wall motion abnormality   noted with dobutamine stress.    ECHOCARDIOGRAM CONCLUSIONS (05/26/17)  Prior echocardiogram 3/18/2017. Compared to the report of the study   done - there has been improvement of LVSF and new AVR.   Normal left ventricular systolic function.  Left ventricular ejection fraction is visually estimated to be 65%.  Known bioprosthetic surgical aortic valve that is functioning normally   with normal transvalvular gradients.  Vmax is 2.06  m/s. Transvalvular gradients are - Peak: 18 mmHg, Mean: 11 mmHg.   Estimated right ventricular systolic pressure is 25 mmHg.    ECHOCARDIOGRAM CONCLUSIONS (03/18/17)  Compared to the echo dated 02/26/2017, the aortic valve is better   visualized.  More views of the gradients are obtained.  The EF appears   to have decreased.  1. Left ventricular ejection fraction is visually estimated to be 45%.   Grade II diastolic  dysfunction.  2. Moderate aortic stenosis with concern for severe low gradient AS.  3. Unable to estimate pulmonary artery pressure due to an inadequate   tricuspid regurgitant jet.     ECHOCARDIOGRAM CONCLUSIONS (12/10/14)  Left ventricle is mildly dilated. Mild concentric left ventricular   hypertrophy. Normal left ventricular systolic function. Grade II   diastolic dysfunction. Left ventricular ejection fraction is 55% to   60%. Normal regional wall motion.  Mild mitral regurgitation.  Mild aortic insufficiency. Mild aortic stenosis based on gradients   across aortic valve.Peak: 29 mmHg Mean: 15.5 mmHg.    Dimensionless index is (17.5/73.6) = .23 suggesting severe aortic    stenosis. If clinically indicated consider KIARRA for further evaluation.   Mild tricuspid regurgitation. Right ventricular systolic pressure is   estimated to be 28-33 mmHg.  Aortic root, 4.9 cm and the ascending aorta, 3.8 cm dilatation.  No prior study for comparison.     EKG performed on (04/11/17) was reviewed: EKG shows sinus rhythm with RSR' of lead V1 and V2 and diffuse inverted T waves of leads.  EKG performed on (11/24/14) EKG shows normal sinus rhythm with non-specific intra-ventricular conduction delay and premature ventricular contractions in couplets.    Laboratory results of (03/21/17) were reviewed. Cholesterol profile of 153/198/35/78 noted.  Laboratory results of (10/18/14) Cholesterol profile of 167/122/45/98 noted.    MPI CONCLUSIONS (12/10/14)  Left ventricular dilation and possible severe systolic dysfunction but   difficult gating due to frequent PVCs and pairs.  Attenuation as noted above, a fixed inferior defect cannot be excluded but   not sufficient to explain the apparent LV dysfunciton.  Abnormal rest and stress EKGs.    TRANSESOPHAGEAL ECHOCARDIOGRAM CONCLUSIONS: (01/28/15)  he appearance of the aortic valve cannot be completely ruled in for   bicuspid morphology. There is heavy calcification of the aortic    leaflets. The non-coronary cusp appears to be the only cusp that opens   and closes without significant motion restriction. Overall, based on   gradients, visual morphology of the aortic valve, the severity of the   stenosis appears to be at least moderate. The amount of aortic   insufficiency is at least moderate. Deep gastric views were attempted.   However, patient developed significant gaging and desaturation.  The ascending aorta and the aortic root are not significantly dilated.    Assessment:     1. S/P AVR (aortic valve replacement)     2. Dilated cardiomyopathy    3. Essential hypertension, benign     4. Dyslipidemia       Medical Decision Making:  Today's Assessment / Status / Plan:     1. Status post aortic valve replacement (#29 mm Forbes Perimount Magna pericardial valve) by Dr. Carter (04/10/17): He is clinically doing well. We will repeat an echocardiogram in one year. He will be on warfarin for three month.  2. History of dilated cardiomyopathy: His left ventricular systolic function has recovered.  3. Hypertension: Blood pressure is well controlled.  4. Hyperlipidemia: He is doing well on statin therapy without myalgia symptoms.    We will follow up in 9 months with an echocardiogram with labs.    CC Roopa Yang

## 2017-05-31 NOTE — MR AVS SNAPSHOT
"        Gagandeep Durbin JrLily   2017 3:00 PM   Office Visit   MRN: [id number]    Department:  [facility]   Dept Phone:  [phone]    Description:  Male : 1960   Provider:  Steve Ruelas M.D.           Reason for Visit     Follow-Up           Allergies as of 2017     Allergen Noted Reactions    Other Misc 04/10/2017   Rash    metals  Blisters on contact site       You were diagnosed with     S/P AVR (aortic valve replacement)   [827219]       Essential hypertension, benign   [401.1.ICD-9-CM]       Dyslipidemia   [367451]         Vital Signs     Blood Pressure Pulse Height Weight Body Mass Index Oxygen Saturation    138/78 mmHg 74 1.854 m (6' 0.99\") 127.007 kg (280 lb) 36.95 kg/m2 95%    Smoking Status                   Former Smoker           Basic Information     Date Of Birth Sex Race Ethnicity Preferred Language    1960 Male White Non- English      Your appointments     2017  9:00 AM   Established Patient with MER[facility] Martinez   [facility]    [address] [id number] [phone]           You will be receiving a confirmation call a few days before your appointment from our automated call confirmation system.              Problem List              ICD-10-CM Priority Class Noted - Resolved    Vitamin D deficiency E55.9   10/21/2014 - Present    Hypothyroid E03.9 Low  2016 - Present    Alcoholism (CMS-HCC) F10.20   2017 - Present    Arthritis M19.90   2017 - Present    History of non-ST elevation myocardial infarction (NSTEMI) I25.2   2017 - Present    PVC (premature ventricular contraction) I49.3 Medium  3/20/2017 - Present    Chronic combined systolic and diastolic CHF (congestive heart failure) (CMS-HCC) I50.42 Medium  3/21/2017 - Present    Obesity (BMI 30-39.9) E66.9   3/28/2017 - Present    Chronic anticoagulation Z79.01   2017 - Present    History of heart valve replacement with bioprosthetic valve " [Z95.4] Z95.4   4/20/2017 - Present    S/P AVR (aortic valve replacement) Z95.2   4/28/2017 - Present    Essential hypertension, benign I10   4/28/2017 - Present    Dyslipidemia E78.5   4/28/2017 - Present    Right foot pain M79.671   5/25/2017 - Present    Glucose intolerance (impaired glucose tolerance) R73.02   5/25/2017 - Present      Health Maintenance        Date Due Completion Dates    IMM PNEUMOCOCCAL 19-64 (ADULT) MEDIUM RISK SERIES (1 of 1 - PPSV23) 3/25/1977 ---    COLONOSCOPY 10/1/2021 10/1/2016 (N/S), 3/1/2013 (N/S)    Override on 10/1/2016: (N/S)    Override on 3/1/2013: (N/S)    IMM DTaP/Tdap/Td Vaccine (2 - Td) 2/10/2027 2/10/2017            Current Immunizations     Influenza Vaccine Quad Inj (Pf) 10/15/2016    Tdap Vaccine 2/10/2017  8:30 AM      Below and/or attached are the medications your provider expects you to take. Review all of your home medications and newly ordered medications with your provider and/or pharmacist. Follow medication instructions as directed by your provider and/or pharmacist. Please keep your medication list with you and share with your provider. Update the information when medications are discontinued, doses are changed, or new medications (including over-the-counter products) are added; and carry medication information at all times in the event of emergency situations     Allergies:  OTHER MISC - Rash               Medications  Valid as of: May 31, 2017 -  4:02 PM    Generic Name Brand Name Tablet Size Instructions for use    Aspirin (Tablet Delayed Response) ECOTRIN 81 MG Take 81 mg by mouth every day.        Betamethasone Dipropionate Aug (Cream) DIPROLENE-AF 0.05 % Apply 1 g to affected area(s) 2 times a day.        Carvedilol (Tab) COREG 3.125 MG Take 1 Tab by mouth 2 times a day, with meals.        Cholecalciferol (Tab) Vitamin D3 2000 UNITS Take 2,000 Units by mouth every day.        Hydrocodone-Acetaminophen (Tab) NORCO 5-325 MG Take 1 Tab by mouth every 8 hours  as needed.        Levothyroxine Sodium (Tab) SYNTHROID 25 MCG Take 1 Tab by mouth Every morning on an empty stomach.        Lisinopril (Tab) PRINIVIL 10 MG Take 1 Tab by mouth every day.        Potassium Chloride Marlin CR (Tab CR) KLOR-CON M20 20 MEQ         Pravastatin Sodium (Tab) PRAVACHOL 40 MG Take 1 Tab by mouth every bedtime.        Sennosides-Docusate Sodium (Tab) PERICOLACE or SENOKOT S 8.6-50 MG Take 1 Tab by mouth every day.        Warfarin Sodium (Tab) COUMADIN 7.5 MG Take 1 Tab by mouth COUMADIN-DAILY.        .                 Medicines prescribed today were sent to:     [facility] [address] [place] [address] - [address] [facility]    [address] [facility] [address]    Phone: [phone] Fax: [phone]    Open 24 Hours?: No      Medication refill instructions:       If your prescription bottle indicates you have medication refills left, it is not necessary to call your provider’s office. Please contact your pharmacy and they will refill your medication.    If your prescription bottle indicates you do not have any refills left, you may request refills at any time through one of the following ways: The online [employer] system (except Urgent Care), by calling your provider’s office, or by asking your pharmacy to contact your provider’s office with a refill request. Medication refills are processed only during regular business hours and may not be available until the next business day. Your provider may request additional information or to have a follow-up visit with you prior to refilling your medication.   *Please Note: Medication refills are assigned a new Rx number when refilled electronically. Your pharmacy may indicate that no refills were authorized even though a new prescription for the same medication is available at the pharmacy. Please request the medicine by name with the pharmacy before contacting your provider for a refill.        Your To Do List     Future Labs/Procedures  Complete By Expires    COMP METABOLIC PANEL  As directed 11/29/2017    ECHOCARDIOGRAM COMP W/O CONT  As directed 6/1/2018         Butter Access Code: [email or web address]  Expires: 6/4/2017 10:32 AM    Butter  A secure, online tool to manage your health information     [employer] Butter® is a secure, online tool that connects you to your personalized health information from the privacy of your home -- day or night - making it very easy for you to manage your healthcare. Once the activation process is completed, you can even access your medical information using the Butter katiana, which is available for free in the Apple Katiana store or Google Play store.     Butter provides the following levels of access (as shown below):   My Chart Features   [facility] Doctor [facility]  Urgent  Care Non-[facility]  Primary Care  Doctor   Email your healthcare team securely and privately 24/7 X X X    Manage appointments: schedule your next appointment; view details of past/upcoming appointments X      Request prescription refills. X      View recent personal medical records, including lab and immunizations X X X X   View health record, including health history, allergies, medications X X X X   Read reports about your outpatient visits, procedures, consult and ER notes X X X X   See your discharge summary, which is a recap of your hospital and/or ER visit that includes your diagnosis, lab results, and care plan. X X       How to register for Butter:  1. Go to  [email or web address].  2. Click on the Sign Up Now box, which takes you to the New Member Sign Up page. You will need to provide the following information:  a. Enter your Butter Access Code exactly as it appears at the top of this page. (You will not need to use this code after you’ve completed the sign-up process. If you do not sign up before the expiration date, you must request a new code.)   b. Enter your date of birth.   c. Enter your home  email address.   d. Click Submit, and follow the next screen’s instructions.  3. Create a [employer] ID. This will be your [employer]s login ID and cannot be changed, so think of one that is secure and easy to remember.  4. Create a [employer] password. You can change your password at any time.  5. Enter your Password Reset Question and Answer. This can be used at a later time if you forget your password.   6. Enter your e-mail address. This allows you to receive e-mail notifications when new information is available in Health Information [employer]s.  7. Click Sign Up. You can now view your health information.    For assistance activating your [employer] account, call [phone]

## 2017-05-31 NOTE — Clinical Note
[facility]   [address] [facility] [address] [facility] [address]  [place] [address]  Phone: [phone]  Fax: [phone]              Gagandeep Durbin Jr.  1960    Encounter Date: 5/31/2017    Steve Ruelas M.D.          PROGRESS NOTE:  Subjective:   Gagandeep Durbin Jr. is a 57 y.o. male who presents today for follow up of .    Since the patient's last visit on 04/28/17, he has been doing well clinically. He denies chest pain, shortness of breath, palpitations, nausea/vomiting or diaphoresis.      Past Medical History   Diagnosis Date   • Shortness of breath 10/14/2014   • BMI 37.0-37.9, adult 10/14/2014   • Hyperlipidemia 10/14/2014   • Right knee pain 10/15/2014   • Dry skin 10/15/2014   • Accessory skin tags 10/15/2014   • Enlarged heart 10/15/2014   • Unspecified vitamin D deficiency 10/21/2014   • Hypertension    • Heart burn    • Indigestion    • Dental disorder      partial   • NSTEMI (non-ST elevated myocardial infarction) (CMS-[facility]) 2/26/2017     Past Surgical History   Procedure Laterality Date   • Recovery  1/28/2015     Performed by [facility] at SURGERY SAME DAY [facility]   • Aortic valve replacement  4/10/2017     Procedure: AORTIC VALVE REPLACEMENT;  Surgeon: Aditya Carter M.D.;  Location: SURGERY [facility];  Service:    • Reese  4/10/2017     Procedure: REESE;  Surgeon: Aditya Carter M.D.;  Location: SURGERY [facility];  Service:      Family History   Problem Relation Age of Onset   • Diabetes Mother    • Cancer Father      prostate   • Heart Disease Father    • Heart Failure Father    • Colon Cancer Father      History   Smoking status   • Former Smoker -- 1.00 packs/day for 33 years   • Quit date: 05/14/2013   Smokeless tobacco   • Never Used     Allergies   Allergen Reactions   • Other Misc Rash     metals  Blisters on contact site      Medications reviewed.    Outpatient Encounter Prescriptions as of 5/31/2017   Medication Sig Dispense Refill   •  "levothyroxine (SYNTHROID) 25 MCG Tab Take 1 Tab by mouth Every morning on an empty stomach. 90 Tab 3   • carvedilol (COREG) 3.125 MG Tab Take 1 Tab by mouth 2 times a day, with meals. 180 Tab 1   • pravastatin (PRAVACHOL) 40 MG tablet Take 1 Tab by mouth every bedtime. 90 Tab 3   • lisinopril (PRINIVIL) 10 MG Tab Take 1 Tab by mouth every day. 90 Tab 3   • warfarin (COUMADIN) 7.5 MG Tab Take 1 Tab by mouth COUMADIN-DAILY. 30 Tab 2   • aspirin EC (ECOTRIN) 81 MG Tablet Delayed Response Take 81 mg by mouth every day.     • Cholecalciferol (VITAMIN D3) 2000 UNITS Tab Take 2,000 Units by mouth every day.     • Aug Betamethasone Dipropionate (DIPROLENE-AF) 0.05 % Cream Apply 1 g to affected area(s) 2 times a day.     • hydrocodone-acetaminophen (NORCO) 5-325 MG Tab per tablet Take 1 Tab by mouth every 8 hours as needed. 20 Tab 0   • KLOR-CON M20 20 MEQ Tab CR      • senna-docusate (PERICOLACE OR SENOKOT S) 8.6-50 MG Tab Take 1 Tab by mouth every day. 30 Tab 0     No facility-administered encounter medications on file as of 5/31/2017.     Review of Systems   Constitutional: Negative for fever and chills.   HENT: Negative for congestion.    Eyes: Negative for blurred vision.   Respiratory: Positive for shortness of breath.    Cardiovascular: Negative for chest pain, palpitations, orthopnea, leg swelling and PND.   Gastrointestinal: Negative for abdominal pain.   Genitourinary: Negative for dysuria.   Musculoskeletal: Negative for myalgias and joint pain.   Skin: Negative for rash.   Neurological: Negative for dizziness and loss of consciousness.   Psychiatric/Behavioral: The patient does not have insomnia.         Objective:   /78 mmHg  Pulse 74  Ht 1.854 m (6' 0.99\")  Wt 127.007 kg (280 lb)  BMI 36.95 kg/m2  SpO2 95%    Physical Exam   Constitutional: He is oriented to person, place, and time. He appears well-developed and well-nourished.   HENT:   Head: Normocephalic and atraumatic.   Eyes: Conjunctivae are " normal. Pupils are equal, round, and reactive to light.   Neck: Normal range of motion. Neck supple.   Cardiovascular: Normal rate and regular rhythm.    Pulmonary/Chest: Effort normal and breath sounds normal.   Abdominal: Soft. Bowel sounds are normal.   Musculoskeletal: Normal range of motion. He exhibits no edema.   Neurological: He is alert and oriented to person, place, and time.   Skin: Skin is warm and dry.   Psychiatric: He has a normal mood and affect.     CARDIAC STUDIES/PROCEDURES:    CARDIAC CATHETERIZATION CONCLUSIONS by Dr. Ibrahim (03/19/17)  1.  At least moderate aortic stenosis with a simultaneous mean gradient of 24    mmHg and calculated aortic valve area of 0.71.  2.  Reduced left ventricular ejection fraction, estimated at 40%.  3.  No significant epicardial coronary artery disease.  4.  Reduced thermodilution cardiac output of 2.53 with an index of 1.02.  5.  Left ventricular end-diastolic pressure 28.    CAROTID ULTRASOUND (03/21/17)  1.  Minimal plaque in the bilateral carotid bulb with less than 50% stenosis.  2.  Normal bilateral subclavian and vertebral arterial exam.    DOBUTAMINE STRESS ECHOCARDIOGRAM (03/20/17)  Augmented LV function with dobutamine stress. Severe AS with mean gradient of 55 mm Hg with   dobutamine. Endocardium not well visualized but no wall motion abnormality   noted with dobutamine stress.    ECHOCARDIOGRAM CONCLUSIONS (05/26/17)  Prior echocardiogram 3/18/2017. Compared to the report of the study   done - there has been improvement of LVSF and new AVR.   Normal left ventricular systolic function.  Left ventricular ejection fraction is visually estimated to be 65%.  Known bioprosthetic surgical aortic valve that is functioning normally   with normal transvalvular gradients.  Vmax is 2.06  m/s. Transvalvular gradients are - Peak: 18 mmHg, Mean: 11 mmHg.   Estimated right ventricular systolic pressure is 25 mmHg.    ECHOCARDIOGRAM CONCLUSIONS (03/18/17)  Compared  to the echo dated 02/26/2017, the aortic valve is better   visualized.  More views of the gradients are obtained.  The EF appears   to have decreased.  1. Left ventricular ejection fraction is visually estimated to be 45%.   Grade II diastolic dysfunction.  2. Moderate aortic stenosis with concern for severe low gradient AS.  3. Unable to estimate pulmonary artery pressure due to an inadequate   tricuspid regurgitant jet.     ECHOCARDIOGRAM CONCLUSIONS (12/10/14)  Left ventricle is mildly dilated. Mild concentric left ventricular   hypertrophy. Normal left ventricular systolic function. Grade II   diastolic dysfunction. Left ventricular ejection fraction is 55% to   60%. Normal regional wall motion.  Mild mitral regurgitation.  Mild aortic insufficiency. Mild aortic stenosis based on gradients   across aortic valve.Peak: 29 mmHg Mean: 15.5 mmHg.    Dimensionless index is (17.5/73.6) = .23 suggesting severe aortic    stenosis. If clinically indicated consider KIARRA for further evaluation.   Mild tricuspid regurgitation. Right ventricular systolic pressure is   estimated to be 28-33 mmHg.  Aortic root, 4.9 cm and the ascending aorta, 3.8 cm dilatation.  No prior study for comparison.     EKG performed on (04/11/17) was reviewed: EKG shows sinus rhythm with RSR' of lead V1 and V2 and diffuse inverted T waves of leads.  EKG performed on (11/24/14) EKG shows normal sinus rhythm with non-specific intra-ventricular conduction delay and premature ventricular contractions in couplets.    Laboratory results of (03/21/17) were reviewed. Cholesterol profile of 153/198/35/78 noted.  Laboratory results of (10/18/14) Cholesterol profile of 167/122/45/98 noted.    MPI CONCLUSIONS (12/10/14)  Left ventricular dilation and possible severe systolic dysfunction but   difficult gating due to frequent PVCs and pairs.  Attenuation as noted above, a fixed inferior defect cannot be excluded but   not sufficient to explain the apparent LV  dysfunciton.  Abnormal rest and stress EKGs.    TRANSESOPHAGEAL ECHOCARDIOGRAM CONCLUSIONS: (01/28/15)  he appearance of the aortic valve cannot be completely ruled in for   bicuspid morphology. There is heavy calcification of the aortic   leaflets. The non-coronary cusp appears to be the only cusp that opens   and closes without significant motion restriction. Overall, based on   gradients, visual morphology of the aortic valve, the severity of the   stenosis appears to be at least moderate. The amount of aortic   insufficiency is at least moderate. Deep gastric views were attempted.   However, patient developed significant gaging and desaturation.  The ascending aorta and the aortic root are not significantly dilated.    Assessment:     1. S/P AVR (aortic valve replacement)     2. Dilated cardiomyopathy    3. Essential hypertension, benign     4. Dyslipidemia       Medical Decision Making:  Today's Assessment / Status / Plan:     1. Status post aortic valve replacement (#29 mm Forbes Perimount Magna pericardial valve) by Dr. Carter (04/10/17): He is clinically doing well. We will repeat an echocardiogram in one year. He will be on warfarin for three month.  2. History of dilated cardiomyopathy: His left ventricular systolic function has recovered.  3. Hypertension: Blood pressure is well controlled.  4. Hyperlipidemia: He is doing well on statin therapy without myalgia symptoms.    We will follow up in 9 months with an echocardiogram with labs.    CC Roopa Yang        No Recipients

## 2017-06-01 ENCOUNTER — ANTICOAGULATION MONITORING (OUTPATIENT)
Dept: VASCULAR LAB | Facility: MEDICAL CENTER | Age: 57
End: 2017-06-01

## 2017-06-01 DIAGNOSIS — Z95.3 HISTORY OF HEART VALVE REPLACEMENT WITH BIOPROSTHETIC VALVE: ICD-10-CM

## 2017-06-01 LAB — INR PPP: 4.2 (ref 2–3.5)

## 2017-06-01 NOTE — PROGRESS NOTES
Anticoagulation Summary as of 6/1/2017     INR goal 2.0-3.0   Selected INR 4.2! (6/1/2017)   Maintenance plan 3.75 mg (7.5 mg x 0.5) on Mon, Wed, Fri; 7.5 mg (7.5 mg x 1) all other days   Weekly total 41.25 mg   Plan last modified Ryanne Mulligan PHARMD (6/1/2017)   Next INR check 6/9/2017   Target end date 7/10/2017    Indications   History of heart valve replacement with bioprosthetic valve [Z95.4] [Z95.4]         Anticoagulation Episode Summary     INR check location Home Draw    Preferred lab     Send INR reminders to     [facility]      Anticoagulation Care Providers     Provider Role Specialty Phone number    Amrit Rosario PHARMD Responsible          Pt is supra therapeutic today.  Will HOLD dose tonight, and reduce weekly dose by 15%. Pt denies any unusual s/s of bleeding, bruising, clotting or any changes to diet or medications.  Follow up in 1 weeks. At the [facility]    Ryanne Mulligan PHARMD    Pt discharged from [facility] today

## 2017-06-06 NOTE — TELEPHONE ENCOUNTER
Please call patient to inform him that from what I see of the repeat sleep study he still has significant number of desaturation events, although oxygen remains in 80% range at least. Goal is >88%. I would recommend continuing oxygen. If he does not want to do this, I would recommend overnight sleep study test. Has he done this?    ANGEL Martinez.

## 2017-06-09 ENCOUNTER — ANTICOAGULATION VISIT (OUTPATIENT)
Dept: MEDICAL GROUP | Facility: PHYSICIAN GROUP | Age: 57
End: 2017-06-09
Payer: COMMERCIAL

## 2017-06-09 VITALS — DIASTOLIC BLOOD PRESSURE: 96 MMHG | HEART RATE: 59 BPM | SYSTOLIC BLOOD PRESSURE: 170 MMHG

## 2017-06-09 DIAGNOSIS — Z95.3 HISTORY OF HEART VALVE REPLACEMENT WITH BIOPROSTHETIC VALVE: ICD-10-CM

## 2017-06-09 LAB — INR PPP: 3.4 (ref 2–3.5)

## 2017-06-09 PROCEDURE — 85610 PROTHROMBIN TIME: CPT | Performed by: NURSE PRACTITIONER

## 2017-06-09 NOTE — PROGRESS NOTES
Anticoagulation Summary as of 6/9/2017     INR goal 2.0-3.0   Selected INR 3.4! (6/9/2017)   Maintenance plan 7.5 mg (7.5 mg x 1) on Sun, Tue, Thu; 3.75 mg (7.5 mg x 0.5) all other days   Weekly total 37.5 mg   Plan last modified FARZANA HardenD (6/9/2017)   Next INR check    Target end date 7/10/2017    Indications   History of heart valve replacement with bioprosthetic valve [Z95.4] [Z95.4]         Anticoagulation Episode Summary     INR check location Home Draw    Preferred lab     Send INR reminders to     Comments       Anticoagulation Care Providers     Provider Role Specialty Phone number    FARZANA CanoD Responsible          Anticoagulation Patient Findings      Current Outpatient Prescriptions on File Prior to Visit   Medication Sig Dispense Refill   • levothyroxine (SYNTHROID) 25 MCG Tab Take 1 Tab by mouth Every morning on an empty stomach. 90 Tab 3   • carvedilol (COREG) 3.125 MG Tab Take 1 Tab by mouth 2 times a day, with meals. 180 Tab 1   • pravastatin (PRAVACHOL) 40 MG tablet Take 1 Tab by mouth every bedtime. 90 Tab 3   • lisinopril (PRINIVIL) 10 MG Tab Take 1 Tab by mouth every day. 90 Tab 3   • hydrocodone-acetaminophen (NORCO) 5-325 MG Tab per tablet Take 1 Tab by mouth every 8 hours as needed. 20 Tab 0   • KLOR-CON M20 20 MEQ Tab CR      • warfarin (COUMADIN) 7.5 MG Tab Take 1 Tab by mouth COUMADIN-DAILY. 30 Tab 2   • senna-docusate (PERICOLACE OR SENOKOT S) 8.6-50 MG Tab Take 1 Tab by mouth every day. 30 Tab 0   • aspirin EC (ECOTRIN) 81 MG Tablet Delayed Response Take 81 mg by mouth every day.     • Cholecalciferol (VITAMIN D3) 2000 UNITS Tab Take 2,000 Units by mouth every day.     • Aug Betamethasone Dipropionate (DIPROLENE-AF) 0.05 % Cream Apply 1 g to affected area(s) 2 times a day.       No current facility-administered medications on file prior to visit.       Lab Results   Component Value Date/Time    SODIUM 131* 04/14/2017 04:30 AM    POTASSIUM 4.4 04/14/2017 04:30 AM     CHLORIDE 96 04/14/2017 04:30 AM    CO2 26 04/14/2017 04:30 AM    GLUCOSE 98 04/14/2017 04:30 AM    BUN 28* 04/14/2017 04:30 AM    CREATININE 1.01 04/14/2017 04:30 AM        Current Outpatient Prescriptions on File Prior to Visit   Medication Sig Dispense Refill   • levothyroxine (SYNTHROID) 25 MCG Tab Take 1 Tab by mouth Every morning on an empty stomach. 90 Tab 3   • carvedilol (COREG) 3.125 MG Tab Take 1 Tab by mouth 2 times a day, with meals. 180 Tab 1   • pravastatin (PRAVACHOL) 40 MG tablet Take 1 Tab by mouth every bedtime. 90 Tab 3   • lisinopril (PRINIVIL) 10 MG Tab Take 1 Tab by mouth every day. 90 Tab 3   • hydrocodone-acetaminophen (NORCO) 5-325 MG Tab per tablet Take 1 Tab by mouth every 8 hours as needed. 20 Tab 0   • KLOR-CON M20 20 MEQ Tab CR      • warfarin (COUMADIN) 7.5 MG Tab Take 1 Tab by mouth COUMADIN-DAILY. 30 Tab 2   • senna-docusate (PERICOLACE OR SENOKOT S) 8.6-50 MG Tab Take 1 Tab by mouth every day. 30 Tab 0   • aspirin EC (ECOTRIN) 81 MG Tablet Delayed Response Take 81 mg by mouth every day.     • Cholecalciferol (VITAMIN D3) 2000 UNITS Tab Take 2,000 Units by mouth every day.     • Aug Betamethasone Dipropionate (DIPROLENE-AF) 0.05 % Cream Apply 1 g to affected area(s) 2 times a day.       No current facility-administered medications on file prior to visit.         Gagandeep Durbin Jr. seen in clinic today  INR  supra-therapeutic.    BP high, did not take meds today yet. Talked about moving ACE to PM.   Denies signs/symptoms of bleeding and/or thrombosis.    Denies changes to diet or medications.   Follow up appointment in 1 week(s).      Decrease weekly warfarin dose as noted       David Yanez, PHARMD

## 2017-06-09 NOTE — MR AVS SNAPSHOT
Gagandeep Durbin Jr.   2017 9:00 AM   Anticoagulation Visit   MRN: [id number]    Department:  [facility]   Dept Phone:  [phone]    Description:  Male : 1960   Provider:  TERESITA ANTI-COAG           Allergies as of 2017     Allergen Noted Reactions    Other Misc 04/10/2017   Rash    metals  Blisters on contact site       You were diagnosed with     History of heart valve replacement with bioprosthetic valve   [959293]         Vital Signs     Blood Pressure Pulse Smoking Status             170/96 mmHg 57 Former Smoker         Basic Information     Date Of Birth Sex Race Ethnicity Preferred Language    1960 Male White Non- English      Your appointments     2017  9:00 AM   Anti-Coag Routine with Browning ANTI-COAG   [facility] ([place]    [address] [place] [id number] [phone]            2017  8:45 AM   Anti-Coag Routine with Browning ANTI-COAG   [facility] ([place]    [address] [place] [address] [phone]            2017  9:00 AM   Established Patient with MERCY Martinez   [facility] ([place])    [address] [place] [address] [phone]           You will be receiving a confirmation call a few days before your appointment from our automated call confirmation system.            2018  1:15 PM   ECHO with ECHO [facility] EXAM 12   ECHOCARDIOLOGY [facility] ([facility])    [address] [facility] [address]   [phone]              Problem List              ICD-10-CM Priority Class Noted - Resolved    Vitamin D deficiency E55.9   10/21/2014 - Present    Hypothyroid E03.9 Low  2016 - Present    Alcoholism (CMS-HCC) F10.20   2017 - Present    Arthritis M19.90   2017 - Present    History of non-ST elevation myocardial infarction (NSTEMI) I25.2   2017 - Present    PVC (premature ventricular contraction) I49.3 Medium  3/20/2017 -  Present    Chronic combined systolic and diastolic CHF (congestive heart failure) (CMS-HCC) I50.42 Medium  3/21/2017 - Present    Obesity (BMI 30-39.9) E66.9   3/28/2017 - Present    Chronic anticoagulation Z79.01   4/20/2017 - Present    History of heart valve replacement with bioprosthetic valve [Z95.4] Z95.4   4/20/2017 - Present    S/P AVR (aortic valve replacement) Z95.2   4/28/2017 - Present    Essential hypertension, benign I10   4/28/2017 - Present    Dyslipidemia E78.5   4/28/2017 - Present    Right foot pain M79.671   5/25/2017 - Present    Glucose intolerance (impaired glucose tolerance) R73.02   5/25/2017 - Present      Health Maintenance        Date Due Completion Dates    IMM PNEUMOCOCCAL 19-64 (ADULT) MEDIUM RISK SERIES (1 of 1 - PPSV23) 3/25/1977 ---    COLONOSCOPY 10/1/2021 10/1/2016 (N/S), 3/1/2013 (N/S)    Override on 10/1/2016: (N/S)    Override on 3/1/2013: (N/S)    IMM DTaP/Tdap/Td Vaccine (2 - Td) 2/10/2027 2/10/2017            Results     POCT Protime      Component    INR    3.4    Comment:     ic valid [id number] 160 exp 03/2018                        Current Immunizations     Influenza Vaccine Quad Inj (Pf) 10/15/2016    Tdap Vaccine 2/10/2017  8:30 AM      Below and/or attached are the medications your provider expects you to take. Review all of your home medications and newly ordered medications with your provider and/or pharmacist. Follow medication instructions as directed by your provider and/or pharmacist. Please keep your medication list with you and share with your provider. Update the information when medications are discontinued, doses are changed, or new medications (including over-the-counter products) are added; and carry medication information at all times in the event of emergency situations     Allergies:  OTHER MISC - Rash               Medications  Valid as of: June 09, 2017 -  8:58 AM    Generic Name Brand Name Tablet Size Instructions for use    Aspirin (Tablet Delayed  Response) ECOTRIN 81 MG Take 81 mg by mouth every day.        Betamethasone Dipropionate Aug (Cream) DIPROLENE-AF 0.05 % Apply 1 g to affected area(s) 2 times a day.        Carvedilol (Tab) COREG 3.125 MG Take 1 Tab by mouth 2 times a day, with meals.        Cholecalciferol (Tab) Vitamin D3 2000 UNITS Take 2,000 Units by mouth every day.        Hydrocodone-Acetaminophen (Tab) NORCO 5-325 MG Take 1 Tab by mouth every 8 hours as needed.        Levothyroxine Sodium (Tab) SYNTHROID 25 MCG Take 1 Tab by mouth Every morning on an empty stomach.        Lisinopril (Tab) PRINIVIL 10 MG Take 1 Tab by mouth every day.        Potassium Chloride Marlin CR (Tab CR) KLOR-CON M20 20 MEQ         Pravastatin Sodium (Tab) PRAVACHOL 40 MG Take 1 Tab by mouth every bedtime.        Sennosides-Docusate Sodium (Tab) PERICOLACE or SENOKOT S 8.6-50 MG Take 1 Tab by mouth every day.        Warfarin Sodium (Tab) COUMADIN 7.5 MG Take 1 Tab by mouth COUMADIN-DAILY.        .                 Medicines prescribed today were sent to:     [facility] [address] [place], [place] [address] [address] [address]    Phone: [phone] Fax: [phone]    Open 24 Hours?: No      Medication refill instructions:       If your prescription bottle indicates you have medication refills left, it is not necessary to call your provider’s office. Please contact your pharmacy and they will refill your medication.    If your prescription bottle indicates you do not have any refills left, you may request refills at any time through one of the following ways: The online [employer] system (except Urgent Care), by calling your provider’s office, or by asking your pharmacy to contact your provider’s office with a refill request. Medication refills are processed only during regular business hours and may not be available until the next business day. Your provider may request additional information or to have a follow-up visit with you  prior to refilling your medication.   *Please Note: Medication refills are assigned a new Rx number when refilled electronically. Your pharmacy may indicate that no refills were authorized even though a new prescription for the same medication is available at the pharmacy. Please request the medicine by name with the pharmacy before contacting your provider for a refill.        Warfarin Dosing Calendar   June 2017 Details    Sun Mon Tue Wed Thu Fri Sat         1               2               3                 4               5               6               7               8               9   3.4   3.75 mg   See details      10      3.75 mg           11      7.5 mg         12      3.75 mg         13      7.5 mg         14      3.75 mg         15      7.5 mg         16      3.75 mg         17      3.75 mg           18      7.5 mg         19      3.75 mg         20      7.5 mg         21      3.75 mg         22      7.5 mg         23      3.75 mg         24      3.75 mg           25      7.5 mg         26      3.75 mg         27      7.5 mg         28      3.75 mg         29      7.5 mg         30      3.75 mg           Date Details   06/09 This INR check   INR: 3.4   ic valid [id number] 160 exp 03/2018      Date of next INR: No date specified         How to take your warfarin dose     To take:  3.75 mg Take 0.5 of a 7.5 mg tablet.    To take:  7.5 mg Take 1 of the 7.5 mg tablets.              [employer] Access Code: Activation code not generated  Current [employer] Status: Active

## 2017-06-23 ENCOUNTER — ANTICOAGULATION VISIT (OUTPATIENT)
Dept: MEDICAL GROUP | Facility: PHYSICIAN GROUP | Age: 57
End: 2017-06-23
Payer: COMMERCIAL

## 2017-06-23 VITALS — DIASTOLIC BLOOD PRESSURE: 96 MMHG | HEART RATE: 67 BPM | SYSTOLIC BLOOD PRESSURE: 153 MMHG

## 2017-06-23 DIAGNOSIS — Z95.3 HISTORY OF HEART VALVE REPLACEMENT WITH BIOPROSTHETIC VALVE: ICD-10-CM

## 2017-06-23 LAB — INR PPP: 1.4 (ref 2–3.5)

## 2017-06-23 PROCEDURE — 85610 PROTHROMBIN TIME: CPT | Performed by: EMERGENCY MEDICINE

## 2017-06-23 RX ORDER — WARFARIN SODIUM 5 MG/1
5-7.5 TABLET ORAL DAILY
Qty: 45 TAB | Refills: 3 | Status: SHIPPED | OUTPATIENT
Start: 2017-06-23 | End: 2017-07-07

## 2017-06-23 NOTE — MR AVS SNAPSHOT
Gagandeep Durbin Jr.   2017 8:45 AM   Anticoagulation Visit   MRN: [id number]    Department:  [facility]   Dept Phone:  [phone]    Description:  Male : 1960   Provider:  ANNA ANTI-COAG           Allergies as of 2017     Allergen Noted Reactions    Other Misc 04/10/2017   Rash    metals  Blisters on contact site       You were diagnosed with     History of heart valve replacement with bioprosthetic valve   [[id number]]         Vital Signs     Blood Pressure Pulse Smoking Status             153/96 mmHg 65 Former Smoker         Basic Information     Date Of Birth Sex Race Ethnicity Preferred Language    1960 Male White Non- English      Your appointments     2017  8:45 AM   Anti-Coag Routine with ANNA ANTI-COAG   [facility]    [address] [facility]  [place] [id number] [phone]            2017  9:00 AM   [facility]    [address] [place], [place] [address] [id number] [phone]           You will be receiving a confirmation call a few days before your appointment from our automated call confirmation system.            2018  1:15 PM   ECHO with ECHO [facility] EXAM 12   ECHOCARDIOLOGY [facility])    [address] [facility] [address]   [phone]              Problem List              ICD-10-CM Priority Class Noted - Resolved    Vitamin D deficiency E55.9   10/21/2014 - Present    Hypothyroid E03.9 Low  2016 - Present    Alcoholism (CMS-HCC) F10.20   2017 - Present    Arthritis M19.90   2017 - Present    History of non-ST elevation myocardial infarction (NSTEMI) I25.2   2017 - Present    PVC (premature ventricular contraction) I49.3 Medium  3/20/2017 - Present    Chronic combined systolic and diastolic CHF (congestive heart failure) (CMS-HCC) I50.42 Medium  3/21/2017 - Present    Obesity (BMI 30-39.9) E66.9   3/28/2017 -  Present    Chronic anticoagulation Z79.01   4/20/2017 - Present    History of heart valve replacement with bioprosthetic valve [Z95.4] Z95.4   4/20/2017 - Present    S/P AVR (aortic valve replacement) Z95.2   4/28/2017 - Present    Essential hypertension, benign I10   4/28/2017 - Present    Dyslipidemia E78.5   4/28/2017 - Present    Right foot pain M79.671   5/25/2017 - Present    Glucose intolerance (impaired glucose tolerance) R73.02   5/25/2017 - Present      Health Maintenance        Date Due Completion Dates    IMM PNEUMOCOCCAL 19-64 (ADULT) MEDIUM RISK SERIES (1 of 1 - PPSV23) 3/25/1977 ---    COLONOSCOPY 10/1/2021 10/1/2016 (N/S), 3/1/2013 (N/S)    Override on 10/1/2016: (N/S)    Override on 3/1/2013: (N/S)    IMM DTaP/Tdap/Td Vaccine (2 - Td) 2/10/2027 2/10/2017            Results     POCT Protime      Component    INR    1.4    Comment:     ic valid [id number] 160 exp 03/2018                        Current Immunizations     Influenza Vaccine Quad Inj (Pf) 10/15/2016    Tdap Vaccine 2/10/2017  8:30 AM      Below and/or attached are the medications your provider expects you to take. Review all of your home medications and newly ordered medications with your provider and/or pharmacist. Follow medication instructions as directed by your provider and/or pharmacist. Please keep your medication list with you and share with your provider. Update the information when medications are discontinued, doses are changed, or new medications (including over-the-counter products) are added; and carry medication information at all times in the event of emergency situations     Allergies:  OTHER MISC - Rash               Medications  Valid as of: June 23, 2017 -  8:56 AM    Generic Name Brand Name Tablet Size Instructions for use    Aspirin (Tablet Delayed Response) ECOTRIN 81 MG Take 81 mg by mouth every day.        Betamethasone Dipropionate Aug (Cream) DIPROLENE-AF 0.05 % Apply 1 g to affected area(s) 2 times a day.         Carvedilol (Tab) COREG 3.125 MG Take 1 Tab by mouth 2 times a day, with meals.        Cholecalciferol (Tab) Vitamin D3 2000 UNITS Take 2,000 Units by mouth every day.        Hydrocodone-Acetaminophen (Tab) NORCO 5-325 MG Take 1 Tab by mouth every 8 hours as needed.        Levothyroxine Sodium (Tab) SYNTHROID 25 MCG Take 1 Tab by mouth Every morning on an empty stomach.        Lisinopril (Tab) PRINIVIL 10 MG Take 1 Tab by mouth every day.        Potassium Chloride Marlin CR (Tab CR) KLOR-CON M20 20 MEQ         Pravastatin Sodium (Tab) PRAVACHOL 40 MG Take 1 Tab by mouth every bedtime.        Sennosides-Docusate Sodium (Tab) PERICOLACE or SENOKOT S 8.6-50 MG Take 1 Tab by mouth every day.        Warfarin Sodium (Tab) COUMADIN 7.5 MG Take 1 Tab by mouth COUMADIN-DAILY.        Warfarin Sodium (Tab) COUMADIN 5 MG Take 1-1.5 Tabs by mouth every day.        .                 Medicines prescribed today were sent to:     [facility] [address] [place], [place] [address] [address] [address]    Phone: [phone] Fax: [phone]    Open 24 Hours?: No      Medication refill instructions:       If your prescription bottle indicates you have medication refills left, it is not necessary to call your provider’s office. Please contact your pharmacy and they will refill your medication.    If your prescription bottle indicates you do not have any refills left, you may request refills at any time through one of the following ways: The online [employer] system (except Urgent Care), by calling your provider’s office, or by asking your pharmacy to contact your provider’s office with a refill request. Medication refills are processed only during regular business hours and may not be available until the next business day. Your provider may request additional information or to have a follow-up visit with you prior to refilling your medication.   *Please Note: Medication refills are assigned a new Rx  number when refilled electronically. Your pharmacy may indicate that no refills were authorized even though a new prescription for the same medication is available at the pharmacy. Please request the medicine by name with the pharmacy before contacting your provider for a refill.        Warfarin Dosing Calendar   June 2017 Details    Sun Mon Tue Wed Thu Fri Sat         1               2               3                 4               5               6               7               8               9               10                 11               12               13               14               15               16               17                 18               19               20               21               22               23   1.4   7.5 mg   See details      24      7.5 mg           25      5 mg         26      7.5 mg         27      5 mg         28      5 mg         29      5 mg         30      7.5 mg           Date Details   06/23 This INR check   INR: 1.4   ic valid [id number] 160 exp 03/2018               How to take your warfarin dose     To take:  5 mg Take 1 of the 5 mg tablets.    To take:  7.5 mg Take 1.5 of the 5 mg tablets.    To take:  7.5 mg Take 1 of the 7.5 mg tablets.           Warfarin Dosing Calendar   July 2017 Details    Sun Mon Tue Wed Thu Fri Sat           1      5 mg           2      5 mg         3      7.5 mg         4      5 mg         5      5 mg         6      5 mg         7      7.5 mg         8                 9               10               11               12               13               14               15                 16               17               18               19               20               21               22                 23               24               25               26               27               28               29                 30               31                     Date Details   No additional details    Date of next INR:  7/7/2017            How to take your warfarin dose     To take:  5 mg Take 1 of the 5 mg tablets.    To take:  7.5 mg Take 1.5 of the 5 mg tablets.              [employer] Access Code: Activation code not generated  Current [employer] Status: Active

## 2017-06-23 NOTE — PROGRESS NOTES
Anticoagulation Summary as of 6/23/2017     INR goal 2.0-3.0   Selected INR 1.4! (6/23/2017)   Maintenance plan 7.5 mg (5 mg x 1.5) on Mon, Fri; 5 mg (5 mg x 1) all other days   Weekly total 40 mg   Plan last modified David Yanez, PHARMD (6/23/2017)   Next INR check 7/7/2017   Target end date 7/10/2017    Indications   History of heart valve replacement with bioprosthetic valve [Z95.4] [Z95.4]         Anticoagulation Episode Summary     INR check location Home Draw    Preferred lab     Send INR reminders to     Comments       Anticoagulation Care Providers     Provider Role Specialty Phone number    Amrit Rosario, PHARMD Responsible          Anticoagulation Patient Findings      Current Outpatient Prescriptions on File Prior to Visit   Medication Sig Dispense Refill   • levothyroxine (SYNTHROID) 25 MCG Tab Take 1 Tab by mouth Every morning on an empty stomach. 90 Tab 3   • carvedilol (COREG) 3.125 MG Tab Take 1 Tab by mouth 2 times a day, with meals. 180 Tab 1   • pravastatin (PRAVACHOL) 40 MG tablet Take 1 Tab by mouth every bedtime. 90 Tab 3   • lisinopril (PRINIVIL) 10 MG Tab Take 1 Tab by mouth every day. 90 Tab 3   • hydrocodone-acetaminophen (NORCO) 5-325 MG Tab per tablet Take 1 Tab by mouth every 8 hours as needed. 20 Tab 0   • KLOR-CON M20 20 MEQ Tab CR      • warfarin (COUMADIN) 7.5 MG Tab Take 1 Tab by mouth COUMADIN-DAILY. 30 Tab 2   • senna-docusate (PERICOLACE OR SENOKOT S) 8.6-50 MG Tab Take 1 Tab by mouth every day. 30 Tab 0   • aspirin EC (ECOTRIN) 81 MG Tablet Delayed Response Take 81 mg by mouth every day.     • Cholecalciferol (VITAMIN D3) 2000 UNITS Tab Take 2,000 Units by mouth every day.     • Aug Betamethasone Dipropionate (DIPROLENE-AF) 0.05 % Cream Apply 1 g to affected area(s) 2 times a day.       No current facility-administered medications on file prior to visit.       Lab Results   Component Value Date/Time    SODIUM 131* 04/14/2017 04:30 AM    POTASSIUM 4.4 04/14/2017 04:30 AM     CHLORIDE 96 04/14/2017 04:30 AM    CO2 26 04/14/2017 04:30 AM    GLUCOSE 98 04/14/2017 04:30 AM    BUN 28* 04/14/2017 04:30 AM    CREATININE 1.01 04/14/2017 04:30 AM        Gagandeep Durbin Jr. seen in clinic today  INR  sub-therapeutic.    Denies signs/symptoms of bleeding and/or thrombosis.    Denies changes to diet or medications.   Follow up appointment in 2 week(s).    Increase weekly warfarin dose as noted     David Yanez, PHARMD

## 2017-07-07 ENCOUNTER — ANTICOAGULATION VISIT (OUTPATIENT)
Dept: MEDICAL GROUP | Facility: PHYSICIAN GROUP | Age: 57
End: 2017-07-07
Payer: COMMERCIAL

## 2017-07-07 VITALS — SYSTOLIC BLOOD PRESSURE: 125 MMHG | DIASTOLIC BLOOD PRESSURE: 73 MMHG | HEART RATE: 67 BPM

## 2017-07-07 DIAGNOSIS — Z95.3 HISTORY OF HEART VALVE REPLACEMENT WITH BIOPROSTHETIC VALVE: ICD-10-CM

## 2017-07-07 LAB — INR PPP: 2.6 (ref 2–3.5)

## 2017-07-07 PROCEDURE — 85610 PROTHROMBIN TIME: CPT | Performed by: PHYSICIAN ASSISTANT

## 2017-07-07 NOTE — MR AVS SNAPSHOT
Gagandeep Durbin Jr.   2017 8:45 AM   Anticoagulation Visit   MRN: [id number]    Department:  [facility]   Dept Phone:  [phone]    Description:  Male : 1960   Provider:  David Yanez, PHARMD           Allergies as of 2017     Allergen Noted Reactions    Other Misc 04/10/2017   Rash    metals  Blisters on contact site       You were diagnosed with     History of heart valve replacement with bioprosthetic valve   [[id number]]         Vital Signs     Blood Pressure Pulse Smoking Status             125/73 mmHg 67 Former Smoker         Basic Information     Date Of Birth Sex Race Ethnicity Preferred Language    1960 Male White Non- English      Your appointments     2017  9:00 AM   Established Patient with [facility]   [facility]    [address] [place] [id number] [phone]           You will be receiving a confirmation call a few days before your appointment from our automated call confirmation system.            2018  1:15 PM   ECHO with ECHO [facility], [facility] EXAM 12   ECHOCARDIOLOGY [facility] ([facility])    [address] [address]   [phone]              Problem List              ICD-10-CM Priority Class Noted - Resolved    Vitamin D deficiency E55.9   10/21/2014 - Present    Hypothyroid E03.9 Low  2016 - Present    Alcoholism (CMS-HCC) F10.20   2017 - Present    Arthritis M19.90   2017 - Present    History of non-ST elevation myocardial infarction (NSTEMI) I25.2   2017 - Present    PVC (premature ventricular contraction) I49.3 Medium  3/20/2017 - Present    Chronic combined systolic and diastolic CHF (congestive heart failure) (CMS-HCC) I50.42 Medium  3/21/2017 - Present    Obesity (BMI 30-39.9) E66.9   3/28/2017 - Present    Chronic anticoagulation Z79.01   2017 - Present    History of heart valve replacement with bioprosthetic valve [Z95.4] Z95.4   2017 - Present    S/P AVR (aortic  valve replacement) Z95.2   4/28/2017 - Present    Essential hypertension, benign I10   4/28/2017 - Present    Dyslipidemia E78.5   4/28/2017 - Present    Right foot pain M79.671   5/25/2017 - Present    Glucose intolerance (impaired glucose tolerance) R73.02   5/25/2017 - Present      Health Maintenance        Date Due Completion Dates    IMM PNEUMOCOCCAL 19-64 (ADULT) MEDIUM RISK SERIES (1 of 1 - PPSV23) 3/25/1977 ---    IMM INFLUENZA (1) 9/1/2017 10/15/2016    COLONOSCOPY 10/1/2021 10/1/2016 (N/S), 3/1/2013 (N/S)    Override on 10/1/2016: (N/S)    Override on 3/1/2013: (N/S)    IMM DTaP/Tdap/Td Vaccine (2 - Td) 2/10/2027 2/10/2017            Results     POCT Protime      Component    INR    2.6    Comment:     ic valid [id number] 160 exp 03/2018                        Current Immunizations     Influenza Vaccine Quad Inj (Pf) 10/15/2016    Tdap Vaccine 2/10/2017  8:30 AM      Below and/or attached are the medications your provider expects you to take. Review all of your home medications and newly ordered medications with your provider and/or pharmacist. Follow medication instructions as directed by your provider and/or pharmacist. Please keep your medication list with you and share with your provider. Update the information when medications are discontinued, doses are changed, or new medications (including over-the-counter products) are added; and carry medication information at all times in the event of emergency situations     Allergies:  OTHER MISC - Rash               Medications  Valid as of: July 07, 2017 - 12:00 PM    Generic Name Brand Name Tablet Size Instructions for use    Betamethasone Dipropionate Aug (Cream) DIPROLENE-AF 0.05 % Apply 1 g to affected area(s) 2 times a day.        Carvedilol (Tab) COREG 3.125 MG Take 1 Tab by mouth 2 times a day, with meals.        Cholecalciferol (Tab) Vitamin D3 2000 UNITS Take 2,000 Units by mouth every day.        Hydrocodone-Acetaminophen (Tab) NORCO 5-325 MG Take 1  Tab by mouth every 8 hours as needed.        Levothyroxine Sodium (Tab) SYNTHROID 25 MCG Take 1 Tab by mouth Every morning on an empty stomach.        Lisinopril (Tab) PRINIVIL 10 MG Take 1 Tab by mouth every day.        Potassium Chloride Marlin CR (Tab CR) KLOR-CON M20 20 MEQ         Pravastatin Sodium (Tab) PRAVACHOL 40 MG Take 1 Tab by mouth every bedtime.        Sennosides-Docusate Sodium (Tab) PERICOLACE or SENOKOT S 8.6-50 MG Take 1 Tab by mouth every day.        Warfarin Sodium (Tab) COUMADIN 7.5 MG Take 1 Tab by mouth COUMADIN-DAILY.        .                 Medicines prescribed today were sent to:     [facility] [address] [place] [address] [address] [facility]    [address] [facility] [address]    Phone: [phone] Fax: [phone]    Open 24 Hours?: No      Medication refill instructions:       If your prescription bottle indicates you have medication refills left, it is not necessary to call your provider’s office. Please contact your pharmacy and they will refill your medication.    If your prescription bottle indicates you do not have any refills left, you may request refills at any time through one of the following ways: The online [employer] system (except Urgent Care), by calling your provider’s office, or by asking your pharmacy to contact your provider’s office with a refill request. Medication refills are processed only during regular business hours and may not be available until the next business day. Your provider may request additional information or to have a follow-up visit with you prior to refilling your medication.   *Please Note: Medication refills are assigned a new Rx number when refilled electronically. Your pharmacy may indicate that no refills were authorized even though a new prescription for the same medication is available at the pharmacy. Please request the medicine by name with the pharmacy before contacting your provider for a refill.        Warfarin Dosing  Calendar   July 2017 Details    Sun Mon Tue Wed Thu Fri Sat           1                 2               3               4               5               6               7   2.6   7.5 mg   See details      8      5 mg           9      5 mg         10               11               12               13               14               15                 16               17               18               19               20               21               22                 23               24               25               26               27               28               29                 30               31                     Date Details   07/07 This INR check   INR: 2.6   ic valid [id number] 160 exp 03/2018      Therapy discontinued on  7/10/2017         How to take your warfarin dose     To take:  5 mg Take 1 of the 5 mg tablets.    To take:  7.5 mg Take 1.5 of the 5 mg tablets.              "[employer] Access Code: Activation code not generated  Current "[employer] Status: Active

## 2017-07-26 NOTE — TELEPHONE ENCOUNTER
Pt. Notified.  He is not ready at this time to see the sleep doctor.  He will continue the oxygen.

## 2017-11-22 ENCOUNTER — TELEPHONE (OUTPATIENT)
Dept: MEDICAL GROUP | Facility: PHYSICIAN GROUP | Age: 57
End: 2017-11-22

## 2017-11-22 DIAGNOSIS — R73.02 GLUCOSE INTOLERANCE (IMPAIRED GLUCOSE TOLERANCE): ICD-10-CM

## 2017-11-22 DIAGNOSIS — D64.9 NORMOCYTIC ANEMIA: ICD-10-CM

## 2017-11-22 DIAGNOSIS — Z12.5 SCREENING FOR PROSTATE CANCER: ICD-10-CM

## 2017-11-22 DIAGNOSIS — E03.9 ACQUIRED HYPOTHYROIDISM: ICD-10-CM

## 2017-11-22 DIAGNOSIS — E78.5 DYSLIPIDEMIA: ICD-10-CM

## 2017-11-22 DIAGNOSIS — E55.9 VITAMIN D DEFICIENCY: ICD-10-CM

## 2017-12-08 ENCOUNTER — HOSPITAL ENCOUNTER (OUTPATIENT)
Dept: LAB | Facility: MEDICAL CENTER | Age: 57
End: 2017-12-08
Attending: NURSE PRACTITIONER
Payer: COMMERCIAL

## 2017-12-08 DIAGNOSIS — E55.9 VITAMIN D DEFICIENCY: ICD-10-CM

## 2017-12-08 DIAGNOSIS — R73.02 GLUCOSE INTOLERANCE (IMPAIRED GLUCOSE TOLERANCE): ICD-10-CM

## 2017-12-08 DIAGNOSIS — Z12.5 SCREENING FOR PROSTATE CANCER: ICD-10-CM

## 2017-12-08 DIAGNOSIS — E78.5 DYSLIPIDEMIA: ICD-10-CM

## 2017-12-08 DIAGNOSIS — D64.9 NORMOCYTIC ANEMIA: ICD-10-CM

## 2017-12-08 DIAGNOSIS — E03.9 ACQUIRED HYPOTHYROIDISM: ICD-10-CM

## 2017-12-08 LAB
25(OH)D3 SERPL-MCNC: 24 NG/ML (ref 30–100)
ALBUMIN SERPL BCP-MCNC: 3.7 G/DL (ref 3.2–4.9)
ALBUMIN/GLOB SERPL: 1.4 G/DL
ALP SERPL-CCNC: 58 U/L (ref 30–99)
ALT SERPL-CCNC: 17 U/L (ref 2–50)
ANION GAP SERPL CALC-SCNC: 10 MMOL/L (ref 0–11.9)
AST SERPL-CCNC: 18 U/L (ref 12–45)
BASOPHILS # BLD AUTO: 1.1 % (ref 0–1.8)
BASOPHILS # BLD: 0.06 K/UL (ref 0–0.12)
BILIRUB SERPL-MCNC: 0.7 MG/DL (ref 0.1–1.5)
BUN SERPL-MCNC: 18 MG/DL (ref 8–22)
CALCIUM SERPL-MCNC: 8.8 MG/DL (ref 8.5–10.5)
CHLORIDE SERPL-SCNC: 105 MMOL/L (ref 96–112)
CHOLEST SERPL-MCNC: 184 MG/DL (ref 100–199)
CO2 SERPL-SCNC: 23 MMOL/L (ref 20–33)
CREAT SERPL-MCNC: 0.96 MG/DL (ref 0.5–1.4)
EOSINOPHIL # BLD AUTO: 0.19 K/UL (ref 0–0.51)
EOSINOPHIL NFR BLD: 3.3 % (ref 0–6.9)
ERYTHROCYTE [DISTWIDTH] IN BLOOD BY AUTOMATED COUNT: 45.7 FL (ref 35.9–50)
EST. AVERAGE GLUCOSE BLD GHB EST-MCNC: 120 MG/DL
GFR SERPL CREATININE-BSD FRML MDRD: >60 ML/MIN/1.73 M 2
GLOBULIN SER CALC-MCNC: 2.6 G/DL (ref 1.9–3.5)
GLUCOSE SERPL-MCNC: 94 MG/DL (ref 65–99)
HBA1C MFR BLD: 5.8 % (ref 0–5.6)
HCT VFR BLD AUTO: 47 % (ref 42–52)
HDLC SERPL-MCNC: 49 MG/DL
HGB BLD-MCNC: 15.6 G/DL (ref 14–18)
IMM GRANULOCYTES # BLD AUTO: 0.02 K/UL (ref 0–0.11)
IMM GRANULOCYTES NFR BLD AUTO: 0.4 % (ref 0–0.9)
LDLC SERPL CALC-MCNC: 108 MG/DL
LYMPHOCYTES # BLD AUTO: 1.15 K/UL (ref 1–4.8)
LYMPHOCYTES NFR BLD: 20.1 % (ref 22–41)
MCH RBC QN AUTO: 30.8 PG (ref 27–33)
MCHC RBC AUTO-ENTMCNC: 33.2 G/DL (ref 33.7–35.3)
MCV RBC AUTO: 92.7 FL (ref 81.4–97.8)
MONOCYTES # BLD AUTO: 0.43 K/UL (ref 0–0.85)
MONOCYTES NFR BLD AUTO: 7.5 % (ref 0–13.4)
NEUTROPHILS # BLD AUTO: 3.86 K/UL (ref 1.82–7.42)
NEUTROPHILS NFR BLD: 67.6 % (ref 44–72)
NRBC # BLD AUTO: 0 K/UL
NRBC BLD AUTO-RTO: 0 /100 WBC
PLATELET # BLD AUTO: 196 K/UL (ref 164–446)
PMV BLD AUTO: 10.8 FL (ref 9–12.9)
POTASSIUM SERPL-SCNC: 4.5 MMOL/L (ref 3.6–5.5)
PROT SERPL-MCNC: 6.3 G/DL (ref 6–8.2)
PSA SERPL-MCNC: 2.01 NG/ML (ref 0–4)
RBC # BLD AUTO: 5.07 M/UL (ref 4.7–6.1)
SODIUM SERPL-SCNC: 138 MMOL/L (ref 135–145)
TRIGL SERPL-MCNC: 136 MG/DL (ref 0–149)
TSH SERPL DL<=0.005 MIU/L-ACNC: 2.75 UIU/ML (ref 0.3–3.7)
WBC # BLD AUTO: 5.7 K/UL (ref 4.8–10.8)

## 2017-12-08 PROCEDURE — 80061 LIPID PANEL: CPT

## 2017-12-08 PROCEDURE — 85025 COMPLETE CBC W/AUTO DIFF WBC: CPT

## 2017-12-08 PROCEDURE — 84153 ASSAY OF PSA TOTAL: CPT

## 2017-12-08 PROCEDURE — 83036 HEMOGLOBIN GLYCOSYLATED A1C: CPT

## 2017-12-08 PROCEDURE — 36415 COLL VENOUS BLD VENIPUNCTURE: CPT

## 2017-12-08 PROCEDURE — 84443 ASSAY THYROID STIM HORMONE: CPT

## 2017-12-08 PROCEDURE — 80053 COMPREHEN METABOLIC PANEL: CPT

## 2017-12-08 PROCEDURE — 82306 VITAMIN D 25 HYDROXY: CPT

## 2017-12-14 ENCOUNTER — OFFICE VISIT (OUTPATIENT)
Dept: MEDICAL GROUP | Facility: PHYSICIAN GROUP | Age: 57
End: 2017-12-14
Payer: COMMERCIAL

## 2017-12-14 VITALS
DIASTOLIC BLOOD PRESSURE: 68 MMHG | HEART RATE: 61 BPM | WEIGHT: 284 LBS | SYSTOLIC BLOOD PRESSURE: 102 MMHG | BODY MASS INDEX: 37.64 KG/M2 | RESPIRATION RATE: 16 BRPM | OXYGEN SATURATION: 94 % | TEMPERATURE: 97.3 F | HEIGHT: 73 IN

## 2017-12-14 DIAGNOSIS — I10 ESSENTIAL HYPERTENSION, BENIGN: ICD-10-CM

## 2017-12-14 DIAGNOSIS — R19.7 DIARRHEA OF PRESUMED INFECTIOUS ORIGIN: ICD-10-CM

## 2017-12-14 DIAGNOSIS — E55.9 VITAMIN D DEFICIENCY: ICD-10-CM

## 2017-12-14 DIAGNOSIS — L40.9 PSORIASIS: ICD-10-CM

## 2017-12-14 DIAGNOSIS — E03.9 ACQUIRED HYPOTHYROIDISM: ICD-10-CM

## 2017-12-14 DIAGNOSIS — R73.02 GLUCOSE INTOLERANCE (IMPAIRED GLUCOSE TOLERANCE): ICD-10-CM

## 2017-12-14 DIAGNOSIS — E78.5 DYSLIPIDEMIA: ICD-10-CM

## 2017-12-14 PROCEDURE — 99214 OFFICE O/P EST MOD 30 MIN: CPT | Performed by: NURSE PRACTITIONER

## 2017-12-14 RX ORDER — CARVEDILOL 3.12 MG/1
3.12 TABLET ORAL 2 TIMES DAILY WITH MEALS
Qty: 180 TAB | Refills: 1 | Status: SHIPPED | OUTPATIENT
Start: 2017-12-14

## 2017-12-14 RX ORDER — LISINOPRIL 20 MG/1
20 TABLET ORAL DAILY
Qty: 90 TAB | Refills: 3 | Status: SHIPPED | OUTPATIENT
Start: 2017-12-14 | End: 2022-05-06

## 2017-12-14 RX ORDER — LOPERAMIDE HYDROCHLORIDE 2 MG/1
2 CAPSULE ORAL 4 TIMES DAILY PRN
Qty: 30 CAP | Refills: 0 | Status: SHIPPED | OUTPATIENT
Start: 2017-12-14 | End: 2022-05-06

## 2017-12-14 ASSESSMENT — PATIENT HEALTH QUESTIONNAIRE - PHQ9: CLINICAL INTERPRETATION OF PHQ2 SCORE: 0

## 2017-12-14 NOTE — PROGRESS NOTES
Subjective:     Chief Complaint   Patient presents with   • Follow-Up     7 months       HPI  Gagandeep Durbin Jr. is a 57 y.o. male here today for routine f/u. States he has had uncontrollable diarrhea for the past 2 days. He had one day of it last week also. No associated N/V, abd pain, or blood in stool. Tried Pepto-Bismol, did not help. No recent antibiotic use. No recent travel. No hiking/camping. No known sick contacts.     Psoriasis  He is following dermatologist in [place] for psoriasis. He was recently seen for f/u and a biopsy was completed of his leg, which did confirm psoriasis.  The halobetasol is helping. He is using this in 2 week cycles. He is concerned that there is reddish discoloration around his leg though since biopsy    Dyslipidemia  Reports compliance with his pravastatin. No myalgias.    Ref. Range 3/21/2017 01:45 12/8/2017 09:08   Cholesterol,Tot Latest Ref Range: 100 - 199 mg/dL 153 184   Triglycerides Latest Ref Range: 0 - 149 mg/dL 198 (H) 136   HDL Latest Ref Range: >=40 mg/dL 35 (A) 49   LDL Latest Ref Range: <100 mg/dL 78 108 (H)       Essential hypertension, benign  Chronic problem well controlled on current medications. Does monitor blood pressure at home. At goal now since he increased lisinopril to 20 mg daily. Denies any severe headache, dizziness, vision changes, chest pain, FISCHER, palpitations, or lower extremity edema      Hypothyroid  Ongoing problem well controlled on levothyroxine 25 mcg daily   Ref. Range 12/8/2017 09:08   TSH Latest Ref Range: 0.300 - 3.700 uIU/mL 2.750       Vitamin D deficiency  On 2,000 units supplement    Ref. Range 12/2/2016 06:40 12/8/2017 09:08   25-Hydroxy   Vitamin D 25 Latest Ref Range: 30 - 100 ng/mL 33 24 (L)       Glucose intolerance (impaired glucose tolerance)  Ongoing chronic problem that remained stable with a glucose lowering therapy. Denies polyuria or polydipsia. Has poor eating habits   Ref. Range 3/24/2017 09:00 4/7/2017 09:18 12/8/2017  09:08   Glycohemoglobin Latest Ref Range: 0.0 - 5.6 % 5.8 (H) 6.0 (H) 5.8 (H)       BMI 37.0-37.9, adult  Does not do any exercise. Has poor eating habits       Diagnoses of Diarrhea of presumed infectious origin, Essential hypertension, benign, Dyslipidemia, Acquired hypothyroidism, Glucose intolerance (impaired glucose tolerance), Psoriasis, Vitamin D deficiency, and BMI 37.0-37.9, adult were pertinent to this visit.    Allergies: Other misc  Current medicines (including changes today)  Current Outpatient Prescriptions   Medication Sig Dispense Refill   • halobetasol (ULTRAVATE) 0.05 % cream Apply  to affected area(s) 2 times a day.     • lisinopril (PRINIVIL) 20 MG Tab Take 1 Tab by mouth every day. 90 Tab 3   • carvedilol (COREG) 3.125 MG Tab Take 1 Tab by mouth 2 times a day, with meals. 180 Tab 1   • mupirocin (BACTROBAN) 2 % Ointment Apply 1 Application to affected area(s) 2 times a day. 1 Tube 0   • loperamide (IMODIUM) 2 MG Cap Take 1 Cap by mouth 4 times a day as needed for Diarrhea. 30 Cap 0   • levothyroxine (SYNTHROID) 25 MCG Tab Take 1 Tab by mouth Every morning on an empty stomach. 90 Tab 3   • pravastatin (PRAVACHOL) 40 MG tablet Take 1 Tab by mouth every bedtime. 90 Tab 3   • warfarin (COUMADIN) 7.5 MG Tab Take 1 Tab by mouth COUMADIN-DAILY. 30 Tab 2   • Cholecalciferol (VITAMIN D3) 2000 UNITS Tab Take 2,000 Units by mouth every day.       No current facility-administered medications for this visit.        He  has a past medical history of Accessory skin tags (10/15/2014); BMI 37.0-37.9, adult (10/14/2014); Dental disorder; Dry skin (10/15/2014); Enlarged heart (10/15/2014); Heart burn; Hyperlipidemia (10/14/2014); Hypertension; Indigestion; NSTEMI (non-ST elevated myocardial infarction) (CMS-HCC) (2/26/2017); Right knee pain (10/15/2014); Shortness of breath (10/14/2014); and Unspecified vitamin D deficiency (10/21/2014). He also has no past medical history of Anxiety or Headache(784.0).    Health  "Maintenance: *UTD    ROS  As stated in HPI and additionally  General: No F/C  CV: see HPI  Pulm: No sob or dyspnea  GI: see HPI      Objective:     Blood pressure 102/68, pulse 61, temperature 36.3 °C (97.3 °F), resp. rate 16, height 1.854 m (6' 1\"), weight (!) 128.8 kg (284 lb), SpO2 94 %. Body mass index is 37.47 kg/m².  Physical Exam:  General: Alert, oriented, in no acute distress.  Eye contact is good, speech goal directed, affect calm  CNs grossly intact.  HEENT: conjunctiva non-injected, sclera non-icteric, EOMs intact.   Gross hearing intact.  Oral mucous membranes pink and moist with no lesions. Oropharynx without erythema, or exudate.   Lungs: unlabored. clear to auscultation bilaterally with good excursion.  CV: regular rate and rhythm. No murmurs. No carotid bruits.   Abdomen: Soft, ND, non-tender. No hepatosplenomegaly. Bowel sounds active.   Ext: no edema, normal color and temperature.   Skin: posterior lower leg with plaque like psoriasis rash with surrounding erythematous rash   Gait steady.     Assessment and Plan:   Assessment/Plan:  1. Diarrhea of presumed infectious origin  Check stool studies. May start imodium once these in. Discussed possible viral syndrome. Monitor for results.   - CDIFF BY PCR; Future  - COMPLETE O&P; Future  - CRYPTO/GIARDIA RAPID ASSAY; Future  - CULTURE STOOL; Future  - OCCULT BLOOD FECES IMMUNOASSAY; Future    2. Essential hypertension, benign  Stable on current meds goal bp <140/90  - lisinopril (PRINIVIL) 20 MG Tab; Take 1 Tab by mouth every day.  Dispense: 90 Tab; Refill: 3  - carvedilol (COREG) 3.125 MG Tab; Take 1 Tab by mouth 2 times a day, with meals.  Dispense: 180 Tab; Refill: 1    3. Dyslipidemia  Stable continue pravastatin     4. Acquired hypothyroidism  Stable on levothyroxine 25 mcg daily    5. Glucose intolerance (impaired glucose tolerance)  Stable enc healthy nutrition, exercise, f/u A1C 6 months    6. Psoriasis  Stable continue f/u with derm. May apply " mupirocin cream to right posterior leg    7. Vitamin D deficiency  Increase to 4,000 units daily     8. BMI 37.0-37.9, adult  - Patient identified as having weight management issue.  Appropriate orders and counseling given.       Follow up:  Return in about 1 week (around 12/21/2017), or diarrhea.    Educated in proper administration of medication(s) ordered today including safety, possible SE, risks, benefits, rationale and alternatives to therapy.   Supportive care, differential diagnoses, and indications for immediate follow-up discussed with patient.    Pathogenesis of diagnosis discussed including typical length and natural progression.    Instructed to return to clinic or nearest emergency department for any change in condition, further concerns, or worsening of symptoms.  Patient states understanding of the plan of care and discharge instructions.      Please note that this dictation was created using voice recognition software. I have made every reasonable attempt to correct obvious errors, but I expect that there are errors of grammar and possibly content that I did not discover before finalizing the note.    Followup: Return in about 1 week (around 12/21/2017), or diarrhea. sooner should new symptoms or problems arise.

## 2017-12-14 NOTE — ASSESSMENT & PLAN NOTE
Reports compliance with his pravastatin. No myalgias.    Ref. Range 3/21/2017 01:45 12/8/2017 09:08   Cholesterol,Tot Latest Ref Range: 100 - 199 mg/dL 153 184   Triglycerides Latest Ref Range: 0 - 149 mg/dL 198 (H) 136   HDL Latest Ref Range: >=40 mg/dL 35 (A) 49   LDL Latest Ref Range: <100 mg/dL 78 108 (H)

## 2017-12-14 NOTE — ASSESSMENT & PLAN NOTE
Chronic problem well controlled on current medications. Does monitor blood pressure at home. At goal now since he increased lisinopril to 20 mg daily. Denies any severe headache, dizziness, vision changes, chest pain, FISCHER, palpitations, or lower extremity edema

## 2017-12-14 NOTE — ASSESSMENT & PLAN NOTE
Ongoing chronic problem that remained stable with a glucose lowering therapy. Denies polyuria or polydipsia. Has poor eating habits   Ref. Range 3/24/2017 09:00 4/7/2017 09:18 12/8/2017 09:08   Glycohemoglobin Latest Ref Range: 0.0 - 5.6 % 5.8 (H) 6.0 (H) 5.8 (H)

## 2017-12-14 NOTE — ASSESSMENT & PLAN NOTE
He is following dermatologist in [place] for psoriasis. He was recently seen for f/u and a biopsy was completed of his leg, which did confirm psoriasis.  The halobetasol is helping. He is using this in 2 week cycles. He is concerned that there is reddish discoloration around his leg though since biopsy

## 2017-12-14 NOTE — ASSESSMENT & PLAN NOTE
Ongoing problem well controlled on levothyroxine 25 mcg daily   Ref. Range 12/8/2017 09:08   TSH Latest Ref Range: 0.300 - 3.700 uIU/mL 2.750

## 2017-12-14 NOTE — ASSESSMENT & PLAN NOTE
On 2,000 units supplement    Ref. Range 12/2/2016 06:40 12/8/2017 09:08   25-Hydroxy   Vitamin D 25 Latest Ref Range: 30 - 100 ng/mL 33 24 (L)

## 2018-01-08 ENCOUNTER — TELEPHONE (OUTPATIENT)
Dept: CARDIOLOGY | Facility: MEDICAL CENTER | Age: 58
End: 2018-01-08

## 2018-01-08 DIAGNOSIS — Z79.01 CHRONIC ANTICOAGULATION: ICD-10-CM

## 2018-01-08 DIAGNOSIS — I25.2 HISTORY OF NON-ST ELEVATION MYOCARDIAL INFARCTION (NSTEMI): ICD-10-CM

## 2018-01-08 DIAGNOSIS — E78.5 DYSLIPIDEMIA: ICD-10-CM

## 2018-01-08 DIAGNOSIS — I50.42 CHRONIC COMBINED SYSTOLIC AND DIASTOLIC CHF (CONGESTIVE HEART FAILURE) (HCC): ICD-10-CM

## 2018-01-08 DIAGNOSIS — Z95.2 S/P AVR (AORTIC VALVE REPLACEMENT): ICD-10-CM

## 2018-01-08 DIAGNOSIS — I10 ESSENTIAL HYPERTENSION, BENIGN: ICD-10-CM

## 2018-01-08 DIAGNOSIS — Z95.3 HISTORY OF HEART VALVE REPLACEMENT WITH BIOPROSTHETIC VALVE: ICD-10-CM

## 2018-01-08 NOTE — TELEPHONE ENCOUNTER
S/w pt's wife, Angélica. At this time, due to insurance, pt will no longer be able to see Dr. Ruelas. They are requesting referral to [facility]. Reassured wife that referral will be place today, and they should hear from someone at [facility] later this week.     Wife appreciative of assistance and denies further needs at this time.     Referral order placed.    ----- Message -----  From: Fanny Ornelas  Sent: 1/8/2018  12:48 PM  To: Brianna Nevarez R.N.  Subject: referral for [facility]               HOWIE/Mingo      Patient's wife Angélica is calling for a referral to [facility]. His insurance will no longer allow patient to see Dr. Ruelas. She can be reached at [phone].

## 2018-01-11 ENCOUNTER — TELEPHONE (OUTPATIENT)
Dept: CARDIOLOGY | Facility: MEDICAL CENTER | Age: 58
End: 2018-01-11

## 2018-03-17 ENCOUNTER — NON-PROVIDER VISIT (OUTPATIENT)
Dept: URGENT CARE | Facility: CLINIC | Age: 58
End: 2018-03-17

## 2018-03-17 ENCOUNTER — NON-PROVIDER VISIT (OUTPATIENT)
Dept: URGENT CARE | Facility: PHYSICIAN GROUP | Age: 58
End: 2018-03-17

## 2018-03-17 DIAGNOSIS — Z02.1 PRE-EMPLOYMENT DRUG SCREENING: ICD-10-CM

## 2018-03-17 LAB
BREATH ALCOHOL COMMENT: NORMAL
POC BREATHALIZER: 0 PERCENT (ref 0–0.01)

## 2018-03-17 PROCEDURE — 80305 DRUG TEST PRSMV DIR OPT OBS: CPT | Performed by: PHYSICIAN ASSISTANT

## 2018-03-17 PROCEDURE — 82075 ASSAY OF BREATH ETHANOL: CPT | Performed by: NURSE PRACTITIONER

## 2018-07-30 NOTE — PROGRESS NOTES
12 hour chart check.   How Severe Is Your Acne?: mild Is This A New Presentation, Or A Follow-Up?: Acne

## 2019-02-12 ENCOUNTER — TELEPHONE (OUTPATIENT)
Dept: SURGERY | Facility: MEDICAL CENTER | Age: 59
End: 2019-02-12

## 2019-02-12 NOTE — TELEPHONE ENCOUNTER
Called wanting a release to go to work.  Dr Thompson is giving him a weight restriction of 70 lbs which he thinks his company will reject.  He wants a full release from [facility] and Dr. Carter.  His surgery was done in the summer of 2018.  Redo AVR tissue valve.

## 2019-02-27 ENCOUNTER — NON-PROVIDER VISIT (OUTPATIENT)
Dept: URGENT CARE | Facility: PHYSICIAN GROUP | Age: 59
End: 2019-02-27

## 2019-02-27 DIAGNOSIS — Z02.83 ENCOUNTER FOR DRUG SCREENING: ICD-10-CM

## 2019-02-27 PROCEDURE — 7503 PR ESCREEN ACCT UDS COL ONLY: Performed by: NURSE PRACTITIONER

## 2020-03-05 NOTE — PROGRESS NOTES
Subjective:   Gagandeep Durbin Jr. is a 57 y.o. male who presents today for hospital follow up.    Since the discharge from [facility] on 04/14/17 status post aortic valve replacement, he has been doing well. He admits to  [facility] shortness of breath. He denies chest pain, palpitations, nausea/vomiting or diaphoresis.     Past Medical History   Diagnosis Date   • Shortness of breath 10/14/2014   • BMI 37.0-37.9, adult 10/14/2014   • Hyperlipidemia 10/14/2014   • Right knee pain 10/15/2014   • Dry skin 10/15/2014   • Accessory skin tags 10/15/2014   • Enlarged heart 10/15/2014   • Unspecified vitamin D deficiency 10/21/2014   • Hypertension    • Heart burn    • Indigestion    • Dental disorder      partial   • NSTEMI (non-ST elevated myocardial infarction) (CMS-HCC) 2/26/2017     Past Surgical History   Procedure Laterality Date   • Recovery  1/28/2015     Performed by Ir-Recovery Surgery at SURGERY SAME DAY [facility] ORS   • Aortic valve replacement  4/10/2017     Procedure: AORTIC VALVE REPLACEMENT;  Surgeon: Aditya Carter M.D.;  Location: SURGERY [facility];  Service:    • Reese  4/10/2017     Procedure: REESE;  Surgeon: Aditya Carter M.D.;  Location: SURGERY [facility];  Service:      Family History   Problem Relation Age of Onset   • Diabetes Mother    • Cancer Father    • Heart Disease Father    • Heart Failure Father      History   Smoking status   • Former Smoker -- 1.00 packs/day for 33 years   • Quit date: 05/14/2013   Smokeless tobacco   • Never Used     Allergies   Allergen Reactions   • Other Misc Rash     metals  Blisters on contact site      Medications reviewed.    Outpatient Encounter Prescriptions as of 4/28/2017   Medication Sig Dispense Refill   • oxycodone immediate release (ROXICODONE) 10 MG immediate release tablet Take 1 Tab by mouth every 3 hours as needed (Severe Pain (NRS Pain Scale 7-10; CPOT Pain Scale 6-8)). 75 Tab 0   • potassium chloride SA (KDUR) 20 MEQ Tab CR Take 1 Tab by  "mouth 2 times a day. 60 Tab 11   • warfarin (COUMADIN) 7.5 MG Tab Take 1 Tab by mouth COUMADIN-DAILY. 30 Tab 2   • furosemide (LASIX) 40 MG Tab Take 1 Tab by mouth every day. 30 Tab 0   • senna-docusate (PERICOLACE OR SENOKOT S) 8.6-50 MG Tab Take 1 Tab by mouth every day. 30 Tab 0   • pravastatin (PRAVACHOL) 20 MG Tab TAKE TWO TABLETS BY MOUTH ONCE DAILY 90 Tab 0   • aspirin EC (ECOTRIN) 81 MG Tablet Delayed Response Take 81 mg by mouth every day.     • carvedilol (COREG) 3.125 MG Tab Take 1 Tab by mouth 2 times a day, with meals. 60 Tab 1   • Cholecalciferol (VITAMIN D3) 2000 UNITS Tab Take 2,000 Units by mouth every day.     • Aug Betamethasone Dipropionate (DIPROLENE-AF) 0.05 % Cream Apply 1 g to affected area(s) 2 times a day.     • levothyroxine (SYNTHROID) 25 MCG Tab Take 1 Tab by mouth Every morning on an empty stomach. 90 Tab 0   • morphine (MS IR) 15 MG tablet Take 1 Tab by mouth every four hours as needed for Severe Pain. 12 Tab 0   • lisinopril (PRINIVIL) 5 MG Tab Take 1 Tab by mouth every day. 30 Tab 1     No facility-administered encounter medications on file as of 4/28/2017.     Review of Systems   Constitutional: Negative for fever and chills.   HENT: Negative for congestion.    Eyes: Negative for blurred vision.   Respiratory: Positive for shortness of breath.    Cardiovascular: Negative for chest pain, palpitations, orthopnea, leg swelling and PND.   Gastrointestinal: Negative for abdominal pain.   Genitourinary: Negative for dysuria.   Musculoskeletal: Negative for myalgias and joint pain.   Skin: Negative for rash.   Neurological: Negative for dizziness and loss of consciousness.   Psychiatric/Behavioral: The patient does not have insomnia.         Objective:   /64 mmHg  Pulse 68  Ht 1.854 m (6' 0.99\")  Wt 121.11 kg (267 lb)  BMI 35.23 kg/m2  SpO2 96%    Physical Exam   Constitutional: He is oriented to person, place, and time. He appears well-developed and well-nourished.   HENT: "   Head: Normocephalic and atraumatic.   Eyes: Conjunctivae are normal. Pupils are equal, round, and reactive to light.   Neck: Normal range of motion. Neck supple.   Cardiovascular: Normal rate and regular rhythm.    Pulmonary/Chest: Effort normal and breath sounds normal.   Abdominal: Soft. Bowel sounds are normal.   Musculoskeletal: Normal range of motion. He exhibits no edema.   Neurological: He is alert and oriented to person, place, and time.   Skin: Skin is warm and dry.   Psychiatric: He has a normal mood and affect.     CARDIAC STUDIES/PROCEDURES:    CARDIAC CATHETERIZATION CONCLUSIONS by Dr. Ibrahim (03/19/17)  1.  At least moderate aortic stenosis with a simultaneous mean gradient of 24    mmHg and calculated aortic valve area of 0.71.  2.  Reduced left ventricular ejection fraction, estimated at 40%.  3.  No significant epicardial coronary artery disease.  4.  Reduced thermodilution cardiac output of 2.53 with an index of 1.02.  5.  Left ventricular end-diastolic pressure 28.    CAROTID ULTRASOUND (03/21/17)   1.  Minimal plaque in the bilateral carotid bulb with less than 50%    stenosis.   2.  Normal bilateral subclavian and vertebral arterial exam.    DOBUTAMINE STRESS ECHOCARDIOGRAM (03/20/17)  Augmented LV function with dobutamine stress. Severe AS with mean gradient of 55 mm Hg with   dobutamine. Endocardium not well visualized but no wall motion abnormality   noted with dobutamine stress.    ECHOCARDIOGRAM CONCLUSIONS (03/18/17)  Compared to the echo dated 02/26/2017, the aortic valve is better   visualized.  More views of the gradients are obtained.  The EF appears   to have decreased.  1. Left ventricular ejection fraction is visually estimated to be 45%.   Grade II diastolic dysfunction.  2. Moderate aortic stenosis with concern for severe low gradient AS.  3. Unable to estimate pulmonary artery pressure due to an inadequate   tricuspid regurgitant jet.     ECHOCARDIOGRAM CONCLUSIONS  (12/10/14)  Left ventricle is mildly dilated. Mild concentric left ventricular   hypertrophy. Normal left ventricular systolic function. Grade II   diastolic dysfunction. Left ventricular ejection fraction is 55% to   60%. Normal regional wall motion.  Mild mitral regurgitation.  Mild aortic insufficiency. Mild aortic stenosis based on gradients   across aortic valve.Peak: 29 mmHg Mean: 15.5 mmHg.    Dimensionless index is (17.5/73.6) = .23 suggesting severe aortic    stenosis. If clinically indicated consider KIARRA for further evaluation.   Mild tricuspid regurgitation. Right ventricular systolic pressure is   estimated to be 28-33 mmHg.  Aortic root, 4.9 cm and the ascending aorta, 3.8 cm dilatation.  No prior study for comparison.     EKG performed on (04/11/17) was reviewed: EKG shows sinus rhythm with RSR' of lead V1 and V2 and diffuse inverted T waves of leads.  EKG performed on (11/24/14) EKG shows normal sinus rhythm with non-specific intra-ventricular conduction delay and premature ventricular contractions in couplets.    Laboratory results of (03/21/17) were reviewed. Cholesterol profile of 153/198/35/78 noted.  Laboratory results of (10/18/14) Cholesterol profile of 167/122/45/98 noted.    MPI CONCLUSIONS (12/10/14)  Left ventricular dilation and possible severe systolic dysfunction but   difficult gating due to frequent PVCs and pairs.  Attenuation as noted above, a fixed inferior defect cannot be excluded but   not sufficient to explain the apparent LV dysfunciton.  Abnormal rest and stress EKGs.    TRANSESOPHAGEAL ECHOCARDIOGRAM CONCLUSIONS: (01/28/15)  he appearance of the aortic valve cannot be completely ruled in for   bicuspid morphology. There is heavy calcification of the aortic   leaflets. The non-coronary cusp appears to be the only cusp that opens   and closes without significant motion restriction. Overall, based on   gradients, visual morphology of the aortic valve, the severity of the    stenosis appears to be at least moderate. The amount of aortic   insufficiency is at least moderate. Deep gastric views were attempted.   However, patient developed significant gaging and desaturation.  The ascending aorta and the aortic root are not significantly dilated.    Assessment:     1. S/P AVR (aortic valve replacement)     2. Dilated cardiomyopathy    3. Essential hypertension, benign     4. Dyslipidemia       Medical Decision Making:  Today's Assessment / Status / Plan:     1. Status post aortic valve replacement (#29 mm Forbes Perimount Magna pericardial valve) by Dr. Carter (04/10/17): He is clinically doing well. We will repeat an echocardiogram. He will be on warfarin for three month.  2. Dilated cardiomyopathy: His volume status is adequate. We will have him hold his diuretic therapy.   3. Hypertension: Blood pressure is well controlled. He has been off of lisinopril due to low blood pressure.  4. Hyperlipidemia: He is doing well on statin therapy without myalgia symptoms.    We will follow up in one month with an echocardiogram.    CC Roopa Yang     negative

## 2020-06-10 NOTE — IP AVS SNAPSHOT
" <p align=\"LEFT\"><IMG SRC=\"//[email or web address]$/[email or web address]\" alt=\"Image\" WIDTH=\"50%\" HEIGHT=\"200\" BORDER=\"\"></p>                   Name:Gagandeep Durbin Jr.  Medical Record Number:[id number]  CSN: [id number]    YOB: 1960   Age: 56 y.o.  Sex: male  HT:1.859 m (6' 1.2\") WT: 124.2 kg (273 lb 13 oz)          Admit Date: 2/25/2017     Discharge Date:   Today's Date: 2/26/2017  Attending Doctor:  James Arias M.D.                  Allergies:  Review of patient's allergies indicates no known allergies.          Your appointments     Apr 28, 2017  8:00 AM   Adult Draw/Collection with [facility] (--)    [address] [place] [address] [address] [place] [address] [phone]            Apr 28, 2017  9:20 AM   FOLLOW UP with Steve Ruelas M.D.   [facility] (--)    [address], [facility] [address] [facility] [address]  [facility] [address] [phone]            May 05, 2017  8:00 AM   Established Patient with FELIX Fournier   [facility] [place]    [address] [place] [id number] [phone]           You will be receiving a confirmation call a few days before your appointment from our automated call confirmation system.              Follow-up Information     1. Follow up with FELIX Fournier.    Specialty:  Family Medicine    Contact information    [facility] [address] [place] [address] [phone]           Medication List      Take these Medications        Instructions    aspirin 81 MG tablet   Notes to Patient:  Tomorrow morning     Take 81 mg by mouth every day.   Dose:  81 mg       Aug Betamethasone Dipropionate 0.05 % Crea   Commonly known as:  DIPROLENE-AF    Apply 1 g to affected area(s) 2 times a day.   Dose:  1 g       Garlic 1000 MG Caps   Notes to Patient:  today    Take 1 Cap by mouth every day.   Dose:  1 Cap       levothyroxine 25 MCG Tabs   Commonly known as:  SYNTHROID   Notes to Patient:  Tomorrow morning     Take 1 Tab by mouth " Every morning on an empty stomach.   Dose:  25 mcg       pravastatin 20 MG Tabs   Commonly known as:  PRAVACHOL   Notes to Patient:  Today     TAKE TWO TABLETS BY MOUTH ONCE DAILY       VITAMIN D-3 PO   Notes to Patient:  Today     Take 2,000 mg by mouth every day.   Dose:  2000 mg          none

## 2021-08-09 ENCOUNTER — NON-PROVIDER VISIT (OUTPATIENT)
Dept: URGENT CARE | Facility: PHYSICIAN GROUP | Age: 61
End: 2021-08-09
Payer: COMMERCIAL

## 2021-08-09 DIAGNOSIS — Z02.1 PRE-EMPLOYMENT DRUG SCREENING: Primary | ICD-10-CM

## 2021-08-09 PROCEDURE — 80305 DRUG TEST PRSMV DIR OPT OBS: CPT | Performed by: PHYSICIAN ASSISTANT

## 2021-08-09 PROCEDURE — 82075 ASSAY OF BREATH ETHANOL: CPT | Performed by: PHYSICIAN ASSISTANT

## 2021-08-10 LAB
BREATH ALCOHOL COMMENT: NORMAL
POC BREATHALIZER: 0 PERCENT (ref 0–0.01)

## 2022-05-06 ENCOUNTER — OFFICE VISIT (OUTPATIENT)
Dept: URGENT CARE | Facility: PHYSICIAN GROUP | Age: 62
End: 2022-05-06
Payer: COMMERCIAL

## 2022-05-06 VITALS
SYSTOLIC BLOOD PRESSURE: 176 MMHG | DIASTOLIC BLOOD PRESSURE: 82 MMHG | WEIGHT: 275 LBS | RESPIRATION RATE: 18 BRPM | HEIGHT: 72 IN | OXYGEN SATURATION: 92 % | TEMPERATURE: 97.9 F | HEART RATE: 60 BPM | BODY MASS INDEX: 37.25 KG/M2

## 2022-05-06 DIAGNOSIS — R04.0 EPISTAXIS: ICD-10-CM

## 2022-05-06 DIAGNOSIS — R79.1 ELEVATED INR (INTERNATIONAL NORMALIZED RATIO): ICD-10-CM

## 2022-05-06 PROCEDURE — 99205 OFFICE O/P NEW HI 60 MIN: CPT | Performed by: FAMILY MEDICINE

## 2022-05-06 NOTE — PROGRESS NOTES
Chief Complaint:    Chief Complaint   Patient presents with   • Epistaxis     INR 8.0 Nose started to bleed this morning abt 730am hasn't stopped       History of Present Illness:    Wife present. He started with nose bleed this AM. Checked his INR at home today - it was 8.0. He is on Warfarin. Last INR in Epic 2.90 on 22.      Past Medical History:    Past Medical History:   Diagnosis Date   • Accessory skin tags 10/15/2014   • BMI 37.0-37.9, adult 10/14/2014   • Dental disorder     partial   • Dry skin 10/15/2014   • Enlarged heart 10/15/2014   • Heart burn    • Hyperlipidemia 10/14/2014   • Hypertension    • Indigestion    • NSTEMI (non-ST elevated myocardial infarction) ([facility]) 2017   • Right knee pain 10/15/2014   • Shortness of breath 10/14/2014   • Unspecified vitamin D deficiency 10/21/2014     Past Surgical History:    Past Surgical History:   Procedure Laterality Date   • AORTIC VALVE REPLACEMENT  4/10/2017    Procedure: AORTIC VALVE REPLACEMENT;  Surgeon: Aditya Carter M.D.;  Location: SURGERY [facility];  Service:    • KIARRA  4/10/2017    Procedure: KIARRA;  Surgeon: Aditya Carter M.D.;  Location: SURGERY [facility];  Service:    • RECOVERY  2015    Performed by Ir[facility]Recovery Surgery at SURGERY SAME DAY [facility]     Social History:    Social History     Socioeconomic History   • Marital status:      Spouse name: Not on file   • Number of children: Not on file   • Years of education: Not on file   • Highest education level: Not on file   Occupational History   • Not on file   Tobacco Use   • Smoking status: Former Smoker     Packs/day: 1.00     Years: 33.00     Pack years: 33.00     Quit date: 2013     Years since quittin.9   • Smokeless tobacco: Never Used   Substance and Sexual Activity   • Alcohol use: Yes     Alcohol/week: 3.6 oz     Types: 6 Cans of beer per week   • Drug use: No   • Sexual activity: Yes     Partners: Female   Other Topics Concern   • Not on  file   Social History Narrative   • Not on file     Social Determinants of Health     Financial Resource Strain: Not on file   Food Insecurity: Not on file   Transportation Needs: Not on file   Physical Activity: Not on file   Stress: Not on file   Social Connections: Not on file   Intimate Partner Violence: Not on file   Housing Stability: Not on file     Family History:    Family History   Problem Relation Age of Onset   • Diabetes Mother    • Cancer Father         prostate   • Heart Disease Father    • Heart Failure Father    • Colon Cancer Father      Medications:    Current Outpatient Medications on File Prior to Visit   Medication Sig Dispense Refill   • carvedilol (COREG) 3.125 MG Tab Take 1 Tab by mouth 2 times a day, with meals. 180 Tab 1   • levothyroxine (SYNTHROID) 25 MCG Tab Take 1 Tab by mouth Every morning on an empty stomach. 90 Tab 3   • pravastatin (PRAVACHOL) 40 MG tablet Take 1 Tab by mouth every bedtime. 90 Tab 3   • warfarin (COUMADIN) 7.5 MG Tab Take 1 Tab by mouth COUMADIN-DAILY. 30 Tab 2   • Cholecalciferol (VITAMIN D3) 2000 UNITS Tab Take 2,000 Units by mouth every day.       No current facility-administered medications on file prior to visit.     Allergies:    Allergies   Allergen Reactions   • Other Misc Rash     metals  Blisters on contact site        Vitals:    Vitals:    05/06/22 1008   BP: (!) 176/82   Pulse: 60   Resp: 18   Temp: 36.6 °C (97.9 °F)   TempSrc: Temporal   SpO2: 92%   Weight: 125 kg (275 lb)   Height: 1.829 m (6')       Physical Exam:    Constitutional: Vital signs reviewed. Appears well-developed and well-nourished. No acute distress.   Eyes: Sclera white, conjunctivae clear.   ENT: External ears normal. Hearing normal. Active right nose bleed.  Neck: Neck supple.   Pulmonary/Chest: Respirations non-labored.   Musculoskeletal: Normal gait. No muscular atrophy or weakness.  Neurological: Alert and oriented to person, place, and time. Muscle tone normal. Coordination  normal.   Skin: No rashes or lesions. Warm, dry, normal turgor.  Psychiatric: Normal mood and affect. Behavior is normal. Judgment and thought content normal.       Medical Decision Makin. Epistaxis    2. Elevated INR (international normalized ratio)      Discussed with them DDX, management options, and risks, benefits, and alternatives to treatment plan agreed upon.    Wife present. He started with nose bleed this AM. Checked his INR at home today - it was 8.0. He is on Warfarin. Last INR in Epic 2.90 on 22.    Active right nose bleed.    I sprayed Isauro-Synephrine into right nare x 2 which initially slowed down right nose bleed some, but it resumed. Advised even if I could stabilize the nose bleed here, the INR being 8.0 needs to be reversed and this needs to be done in the hospital.    They understand and agree and will go to [facility] now.    An extra spray of Isauro-Synephrine done into right nare and right nare packed with gauze. Extra gauze provided for him to use if needed on way to hospital.

## 2022-07-19 ENCOUNTER — NON-PROVIDER VISIT (OUTPATIENT)
Dept: URGENT CARE | Facility: PHYSICIAN GROUP | Age: 62
End: 2022-07-19

## 2022-07-19 DIAGNOSIS — Z02.83 EMPLOYMENT-RELATED DRUG TESTING, ENCOUNTER FOR: ICD-10-CM

## 2022-07-19 PROCEDURE — 8907 PR URINE COLLECT ONLY

## 2022-08-12 NOTE — PROGRESS NOTES
Anticoagulation Summary as of 7/7/2017     INR goal 2.0-3.0   Selected INR 2.6 (7/7/2017)   Maintenance plan 7.5 mg (5 mg x 1.5) on Mon, Fri; 5 mg (5 mg x 1) all other days   Weekly total 40 mg   Plan last modified David Yanez, PHARMD (6/23/2017)   Next INR check    Target end date 7/10/2017    Indications   History of heart valve replacement with bioprosthetic valve [Z95.4] [Z95.4]         Anticoagulation Episode Summary     INR check location Home Draw    Preferred lab     Discontinue date 7/10/2017    Discontinue reason Therapy Completed    Send INR reminders to     Comments       Anticoagulation Care Providers     Provider Role Specialty Phone number    FARZANA CanoD Responsible          Anticoagulation Patient Findings      Current Outpatient Prescriptions on File Prior to Visit   Medication Sig Dispense Refill   • levothyroxine (SYNTHROID) 25 MCG Tab Take 1 Tab by mouth Every morning on an empty stomach. 90 Tab 3   • carvedilol (COREG) 3.125 MG Tab Take 1 Tab by mouth 2 times a day, with meals. 180 Tab 1   • pravastatin (PRAVACHOL) 40 MG tablet Take 1 Tab by mouth every bedtime. 90 Tab 3   • lisinopril (PRINIVIL) 10 MG Tab Take 1 Tab by mouth every day. 90 Tab 3   • hydrocodone-acetaminophen (NORCO) 5-325 MG Tab per tablet Take 1 Tab by mouth every 8 hours as needed. 20 Tab 0   • KLOR-CON M20 20 MEQ Tab CR      • warfarin (COUMADIN) 7.5 MG Tab Take 1 Tab by mouth COUMADIN-DAILY. 30 Tab 2   • senna-docusate (PERICOLACE OR SENOKOT S) 8.6-50 MG Tab Take 1 Tab by mouth every day. 30 Tab 0   • Cholecalciferol (VITAMIN D3) 2000 UNITS Tab Take 2,000 Units by mouth every day.     • Aug Betamethasone Dipropionate (DIPROLENE-AF) 0.05 % Cream Apply 1 g to affected area(s) 2 times a day.       No current facility-administered medications on file prior to visit.       Lab Results   Component Value Date/Time    SODIUM 131* 04/14/2017 04:30 AM    POTASSIUM 4.4 04/14/2017 04:30 AM    CHLORIDE 96 04/14/2017 04:30  Stevenson Robison Jr. 55 y.o. male is here for Blood Pressure check. in person    Manual Blood pressure reading was  120/86, Pulse 93. (Checked at the end of the visit)    If high, was it repeated after 15 minutes? no    Diagnosed with Hypertension yes.    Patient took blood pressure medication today yes.  Last dose of blood pressure medication was taken at 0600am. Patient took HCTZ 25 mg at 0600am today, Amlodipine 08/11/22 @ 8pm.     All Medications and OTC medication updated yes    Does patient have record of home blood pressure readings / Blood Pressure Log yes. 1030am 139/84, HR 68, 12:05pm 157/94, HR 68, 1pm 130/84, HR 77, 1:14pm 131/92, HR 75,    Does the pt have any complaints today in regards to their blood pressure medication? no. Complains of NA. Patient is asymptomatic.     Were you sitting still for 5-10 minutes prior to taking your Blood pressure? yes     Has your blood pressure monitor ever been checked? no When was last time we checked your blood pressure monitor? NA    Updated vitals yes    Follow up date is NA.     Dr. Rodríguez notified.     Creatinine   Date Value Ref Range Status   11/23/2021 1.1 0.5 - 1.4 mg/dL Final     Sodium   Date Value Ref Range Status   11/23/2021 141 136 - 145 mmol/L Final     Potassium   Date Value Ref Range Status   11/23/2021 4.2 3.5 - 5.1 mmol/L Final        AM    CO2 26 04/14/2017 04:30 AM    GLUCOSE 98 04/14/2017 04:30 AM    BUN 28* 04/14/2017 04:30 AM    CREATININE 1.01 04/14/2017 04:30 AM          Gagandeep Durbin Jr. seen in clinic today  INR  therapeutic.    Denies signs/symptoms of bleeding and/or thrombosis.    Denies changes to diet or medications.   Follow up appointment in 3 days then stop warfarin and continue ASA indef.   He will be DCd from clinic as he has completed 3 months of therapy     Continue weekly warfarin dose as noted     David Yanez, PHARMD            No

## 2022-09-09 ENCOUNTER — OFFICE VISIT (OUTPATIENT)
Dept: URGENT CARE | Facility: PHYSICIAN GROUP | Age: 62
End: 2022-09-09
Payer: COMMERCIAL

## 2022-09-09 VITALS
WEIGHT: 274 LBS | SYSTOLIC BLOOD PRESSURE: 130 MMHG | HEIGHT: 73 IN | DIASTOLIC BLOOD PRESSURE: 78 MMHG | RESPIRATION RATE: 17 BRPM | BODY MASS INDEX: 36.31 KG/M2 | HEART RATE: 72 BPM | OXYGEN SATURATION: 97 % | TEMPERATURE: 97.9 F

## 2022-09-09 DIAGNOSIS — M25.521 PAIN AND SWELLING OF RIGHT ELBOW: ICD-10-CM

## 2022-09-09 DIAGNOSIS — M25.421 PAIN AND SWELLING OF RIGHT ELBOW: ICD-10-CM

## 2022-09-09 PROCEDURE — 99214 OFFICE O/P EST MOD 30 MIN: CPT | Performed by: NURSE PRACTITIONER

## 2022-09-09 RX ORDER — ATORVASTATIN CALCIUM 20 MG/1
40 TABLET, FILM COATED ORAL DAILY
COMMUNITY
End: 2023-08-09

## 2022-09-09 RX ORDER — FUROSEMIDE 20 MG/1
20 TABLET ORAL DAILY
COMMUNITY

## 2022-09-09 ASSESSMENT — ENCOUNTER SYMPTOMS
FOCAL WEAKNESS: 0
SENSORY CHANGE: 0
TINGLING: 0
CHILLS: 0
FEVER: 0

## 2022-09-09 NOTE — PROGRESS NOTES
Subjective     Gagandeep Durbin Jr. is a 62 y.o. male who presents with Elbow Problem (Swollen right elbow x 3 days. No known specific injury )            HPI  New problem.  Patient is a 62-year-old male who presents with redness and swelling of his right elbow x3 days.  This has become progressively worse over time.  He also reports very tender to palpation.  Patient does have a history of gout however he denies that this is a constant throbbing pain.  He denies having any trauma to this area.  His history is relevant for aortic valve replacement.  [facility]  Current Outpatient Medications on File Prior to Visit   Medication Sig Dispense Refill    furosemide (LASIX) 20 MG Tab Take 20 mg by mouth every day.      atorvastatin (LIPITOR) 20 MG Tab Take 40 mg by mouth every day.      carvedilol (COREG) 3.125 MG Tab Take 1 Tab by mouth 2 times a day, with meals. 180 Tab 1    levothyroxine (SYNTHROID) 25 MCG Tab Take 1 Tab by mouth Every morning on an empty stomach. 90 Tab 3    warfarin (COUMADIN) 7.5 MG Tab Take 1 Tab by mouth COUMADIN-DAILY. 30 Tab 2     No current facility-administered medications on file prior to visit.     Social History     Socioeconomic History    Marital status:      Spouse name: Not on file    Number of children: Not on file    Years of education: Not on file    Highest education level: Not on file   Occupational History    Not on file   Tobacco Use    Smoking status: Former     Packs/day: 1.00     Years: 33.00     Pack years: 33.00     Types: Cigarettes     Quit date: 2013     Years since quittin.3    Smokeless tobacco: Never   Substance and Sexual Activity    Alcohol use: Yes     Alcohol/week: 3.6 oz     Types: 6 Cans of beer per week    Drug use: No    Sexual activity: Yes     Partners: Female   Other Topics Concern    Not on file   Social History Narrative    Not on file     Social Determinants of Health     Financial Resource Strain: Not on file   Food Insecurity: Not on file  "  Transportation Needs: Not on file   Physical Activity: Not on file   Stress: Not on file   Social Connections: Not on file   Intimate Partner Violence: Not on file   Housing Stability: Not on file     Breast Cancer-related family history is not on file.      Review of Systems   Constitutional:  Negative for chills, fever and malaise/fatigue.   Musculoskeletal:  Positive for joint pain.   Skin:         +erythema over right elbow   Neurological:  Negative for tingling, sensory change and focal weakness.            Objective     /78 (BP Location: Left arm, Patient Position: Sitting, BP Cuff Size: Large adult)   Pulse 72   Temp 36.6 °C (97.9 °F) (Temporal)   Resp 17   Ht 1.854 m (6' 1\")   Wt 124 kg (274 lb)   SpO2 97%   BMI 36.15 kg/m²      Physical Exam  Constitutional:       Appearance: Normal appearance.   Cardiovascular:      Rate and Rhythm: Normal rate and regular rhythm.      Heart sounds: Murmur heard.   Pulmonary:      Effort: Pulmonary effort is normal.      Breath sounds: Normal breath sounds.   Musculoskeletal:      Right elbow: Swelling present. Decreased range of motion. Tenderness present.   Skin:     General: Skin is warm and dry.      Findings: Erythema present.   Neurological:      Mental Status: He is alert.                           Assessment & Plan        1. Pain and swelling of right elbow          Patient is sent to the nearest emergency department for evaluation of this problem.  Differential diagnosis includes cellulitis versus septic joint versus gout.  In the presence of having any artificial aortic valve it is in his best interest to have a more thorough evaluation of this problem.  He is in agreement and will head over to [facility] emergency room over in [place] for further evaluation.                "

## 2023-08-09 ENCOUNTER — OFFICE VISIT (OUTPATIENT)
Dept: URGENT CARE | Facility: PHYSICIAN GROUP | Age: 63
End: 2023-08-09
Payer: COMMERCIAL

## 2023-08-09 VITALS
DIASTOLIC BLOOD PRESSURE: 86 MMHG | RESPIRATION RATE: 16 BRPM | TEMPERATURE: 97.5 F | HEIGHT: 72 IN | SYSTOLIC BLOOD PRESSURE: 136 MMHG | WEIGHT: 249 LBS | HEART RATE: 61 BPM | OXYGEN SATURATION: 98 % | BODY MASS INDEX: 33.72 KG/M2

## 2023-08-09 DIAGNOSIS — L03.116 CELLULITIS OF LEFT LOWER EXTREMITY: ICD-10-CM

## 2023-08-09 DIAGNOSIS — S81.802A WOUND OF LEFT LOWER EXTREMITY, INITIAL ENCOUNTER: ICD-10-CM

## 2023-08-09 PROCEDURE — 3075F SYST BP GE 130 - 139MM HG: CPT | Performed by: REGISTERED NURSE

## 2023-08-09 PROCEDURE — 90715 TDAP VACCINE 7 YRS/> IM: CPT | Performed by: REGISTERED NURSE

## 2023-08-09 PROCEDURE — 90471 IMMUNIZATION ADMIN: CPT | Performed by: REGISTERED NURSE

## 2023-08-09 PROCEDURE — 3079F DIAST BP 80-89 MM HG: CPT | Performed by: REGISTERED NURSE

## 2023-08-09 PROCEDURE — 99214 OFFICE O/P EST MOD 30 MIN: CPT | Mod: 25 | Performed by: REGISTERED NURSE

## 2023-08-09 RX ORDER — AMOXICILLIN AND CLAVULANATE POTASSIUM 875; 125 MG/1; MG/1
1 TABLET, FILM COATED ORAL 2 TIMES DAILY
Qty: 14 TABLET | Refills: 0 | Status: SHIPPED | OUTPATIENT
Start: 2023-08-09 | End: 2023-08-16

## 2023-08-09 RX ORDER — WARFARIN SODIUM 10 MG/1
1 TABLET ORAL
COMMUNITY
Start: 2023-02-21

## 2023-08-09 RX ORDER — CARVEDILOL 12.5 MG/1
12.5 TABLET ORAL 2 TIMES DAILY WITH MEALS
COMMUNITY
Start: 2023-05-22

## 2023-08-09 RX ORDER — ATORVASTATIN CALCIUM 40 MG/1
40 TABLET, FILM COATED ORAL
COMMUNITY
Start: 2023-07-26

## 2023-08-09 ASSESSMENT — FIBROSIS 4 INDEX: FIB4 SCORE: 1.43

## 2023-08-09 ASSESSMENT — ENCOUNTER SYMPTOMS
SHORTNESS OF BREATH: 0
CHILLS: 0
SENSORY CHANGE: 0
MYALGIAS: 0
TINGLING: 0
FEVER: 0

## 2023-08-10 NOTE — PROGRESS NOTES
Subjective:   Gagandeep Durbin Jr. is a 63 y.o. male who presents for Wound Infection ((L) leg x 1 wk, pt states he hit his leg on the car door and loss some skin when taking off the band aid )    HPI  1 week ago patient's car door hit him in the back of the left leg causing a puncture injury, head covered with a Band-Aid and a few days ago pulled it off and a piece of skin came with it.  There is some redness surrounding it.  He is worried it is infected.  Denies history of distal circulation issues, borderline diabetes, denies kidney dysfunction with last GFR of 83 mL/min on CMP drawn July 2023.  Has been using hydrogen peroxide to clean.  No history of MRSA. Last tdap 2017    Review of Systems   Constitutional:  Negative for chills and fever.   Respiratory:  Negative for shortness of breath.    Cardiovascular:  Negative for chest pain.   Musculoskeletal:  Negative for myalgias.   Neurological:  Negative for tingling and sensory change.       Medications, Allergies, and current problem list reviewed today in Epic.     Objective:     /86   Pulse 61   Temp 36.4 °C (97.5 °F) (Temporal)   Resp 16   Ht 1.829 m (6')   Wt 113 kg (249 lb)   SpO2 98%     Physical Exam  Vitals and nursing note reviewed.   Constitutional:       Appearance: Normal appearance. He is not ill-appearing or toxic-appearing.   HENT:      Mouth/Throat:      Mouth: Mucous membranes are moist.   Eyes:      Pupils: Pupils are equal, round, and reactive to light.   Cardiovascular:      Rate and Rhythm: Normal rate and regular rhythm.      Heart sounds: No murmur heard.  Pulmonary:      Effort: Pulmonary effort is normal. No respiratory distress.      Breath sounds: Normal breath sounds.   Abdominal:      General: Abdomen is flat.      Palpations: Abdomen is soft.   Musculoskeletal:         General: Normal range of motion.      Cervical back: Normal range of motion.   Skin:     General: Skin is warm and dry.      Capillary Refill: Capillary refill  takes less than 2 seconds.          Neurological:      General: No focal deficit present.      Mental Status: He is alert and oriented to person, place, and time.   Psychiatric:         Mood and Affect: Mood normal.           Assessment/Plan:     Diagnosis and associated orders:     1. Cellulitis of left lower extremity  amoxicillin-clavulanate (AUGMENTIN) 875-125 MG Tab      2. Wound of left lower extremity, initial encounter  amoxicillin-clavulanate (AUGMENTIN) 875-125 MG Tab    Tdap =>8yo IM           Comments/MDM:   1 week ago patient left leg was struck by car door causing a puncture like injury to the left calf had it covered with a Band-Aid and pulled off this which resulted in a skin tear.  Since injury pain has remained stable but there is increasing redness surrounding the area.  He denies drainage.  No history of distal circulation issues, borderline diabetes, no kidney dysfunction issues with normal GFR per last labs.  However, patient is on warfarin for heart surgeries.  Tdap not up-to-date.  Has been using hydrogen peroxide.  Thankfully vital signs within normal limits.  On exam there are 2 wounds to the posterior left calf, no purulent drainage, no fluctuance, there is erythema and warmth surrounding them.  No active bleeding.  Will place on Augmentin x 7 days no dose adjustment needed based on last GFR.  Will update tetanus.  Discussed wound care which includes cessation of hydrogen peroxide use.  Outlined with surgical pen.  Reviewed signs symptoms of worsening infection.  Follow up with primary care provider     Differential diagnosis, natural history, supportive care, and indications for immediate follow-up discussed.    Return to clinic or go to ED if symptoms worsen or persist. Indications for ED discussed at length. Patient/Parent/Guardian voices understanding. Follow-up with your primary care provider in 3-5 days. Red flag symptoms discussed. All side effects of medication discussed including  allergic response, GI upset, tendon injury, rash, sedation etc.    I personally reviewed prior external notes and test results pertinent to today's visit as well as additional imaging and testing completed in clinic today.     Please note that this dictation was created using voice recognition software. I have made every reasonable attempt to correct obvious errors, but I expect that there are errors of grammar and possibly content that I did not discover before finalizing the note.    This note was electronically signed by MERCY San

## 2023-08-23 ENCOUNTER — NON-PROVIDER VISIT (OUTPATIENT)
Dept: URGENT CARE | Facility: PHYSICIAN GROUP | Age: 63
End: 2023-08-23

## 2023-08-23 DIAGNOSIS — Z02.83 ENCOUNTER FOR DRUG SCREENING: ICD-10-CM

## 2023-08-23 LAB
BREATH ALCOHOL COMMENT: NORMAL
POC BREATHALIZER: 0 PERCENT (ref 0–0.01)

## 2023-08-23 PROCEDURE — 82075 ASSAY OF BREATH ETHANOL: CPT | Performed by: NURSE PRACTITIONER

## 2023-08-23 PROCEDURE — 8907 PR URINE COLLECT ONLY: Performed by: NURSE PRACTITIONER

## 2023-10-12 ENCOUNTER — HOSPITAL ENCOUNTER (OUTPATIENT)
Dept: RADIOLOGY | Facility: MEDICAL CENTER | Age: 63
End: 2023-10-12
Attending: FAMILY MEDICINE
Payer: COMMERCIAL

## 2023-10-12 DIAGNOSIS — M54.50 ACUTE LEFT-SIDED LOW BACK PAIN WITHOUT SCIATICA: ICD-10-CM

## 2023-10-12 DIAGNOSIS — M70.62 TROCHANTERIC BURSITIS OF LEFT HIP: ICD-10-CM

## 2023-10-12 PROCEDURE — 72100 X-RAY EXAM L-S SPINE 2/3 VWS: CPT

## 2023-10-12 PROCEDURE — 73502 X-RAY EXAM HIP UNI 2-3 VIEWS: CPT | Mod: LT

## 2025-02-13 NOTE — TELEPHONE ENCOUNTER
Forwarded to nurse pool to review patient request in previous message as [facility] can NOT call nor discuss medications.       Thank you [facility]      Jose with [facility] does his INR. How often does inr need to be done

## 2025-03-18 NOTE — MR AVS SNAPSHOT
Gagandeep Funesluis Cheema   2017 1:15 PM   Office Visit   MRN: [id number]    Department:  [facility]   Dept Phone:  [phone]    Description:  Male : 1960   Provider:  ELIJAH Salinas M.D.           Reason for Visit     Gout Recurring condition, Pt states he is out of medication, states he needs prednisone      Allergies as of 2017     Allergen Noted Reactions    Other Misc 04/10/2017   Rash    metals  Blisters on contact site       You were diagnosed with     Acute idiopathic gout of right foot   [[id number]]         Vital Signs     Blood Pressure Pulse Temperature Respirations Weight Oxygen Saturation    130/80 mmHg 94 36.7 °C (98 °F) 18 124.739 kg (275 lb) 94%    Smoking Status                   Former Smoker           Basic Information     Date Of Birth Sex Race Ethnicity Preferred Language    1960 Male White Non- English      Your appointments     May 25, 2017  8:20 AM   NEW TO YOU with MERCY Martinez   [facility]    [address] [id number] [phone]            May 26, 2017  2:15 PM   ECHO with ECHO [facility] EXAM 10   ECHOCARDIOLOGY [facility] ([facility])    [address]  [place] [address] [phone]           No prep            May 31, 2017  3:00 PM   FOLLOW UP with Steve Ruelas M.D.   [facility] (--)    [address], [address]  [place] [id number] [phone]              Problem List              ICD-10-CM Priority Class Noted - Resolved    BMI 37.0-37.9, adult Z68.37   10/14/2014 - Present    Accessory skin tags Q82.8   10/15/2014 - Present    Vitamin D deficiency E55.9   10/21/2014 - Present    Abnormal EKG R94.31   2014 - Present    Hypothyroid E03.9 Low  2016 - Present    Need for Tdap vaccination Z23   2/10/2017 - Present    Alcoholism (CMS-HCC) F10.20   2017 - Present    Hypothyroidism E03.9   2017 - Present    Arthritis M19.90   2017 - Present    [facility] Note    Chief Complaint      Ear wax removal   Skin rash      History of Present Illness      Don Simeon  is a 29yo M recently diagnosed with left inguinal hernia and trace right side hydrocele who presents for ear wax removal and also concerned about extensive skin rash and toe nail discoloration .  Cailin reports noticing skin changes, including hyperpigmented spots on his neck and widespread over his chest. He expresses concern about his left foot's middle toe nail darkening, resembling the typically darker pinky toe nail.    Cailin Ochoa is a 28 y.o. male who presents with:    Problem List Addressed This Visit:    1. Cerumen debris on tympanic membrane of both ears  -     Ear wax removal    2. Tinea manuum, pedis, and unguium  -     ciclopirox (PENLAC) 8 % Soln; Apply topically nightly.  Dispense: 18 mL; Refill: 1  -     fluconazole (DIFLUCAN) 150 MG Tab; Take 2 tablets (300 mg total) by mouth every 7 days. for 21 days  Dispense: 6 tablet; Refill: 0    3. Tinea versicolor  -     fluconazole (DIFLUCAN) 150 MG Tab; Take 2 tablets (300 mg total) by mouth every 7 days. for 21 days  Dispense: 6 tablet; Refill: 0           Outpatient Encounter Medications as of 3/18/2025   Medication Sig Dispense Refill    ciclopirox (PENLAC) 8 % Soln Apply topically nightly. 18 mL 1    fluconazole (DIFLUCAN) 150 MG Tab Take 2 tablets (300 mg total) by mouth every 7 days. for 21 days 6 tablet 0    ibuprofen (ADVIL,MOTRIN) 600 MG tablet Take 1 tablet (600 mg total) by mouth every 6 (six) hours as needed for Pain. 20 tablet 0     No facility-administered encounter medications on file as of 3/18/2025.        Review of patient's allergies indicates:  No Known Allergies    Physical Exam      Vital Signs  Temp: 97.9 °F (36.6 °C)  Temp Source: Temporal  Pulse: 88  Resp: 19  SpO2: 98 %  BP: 130/80  BP Location: Right arm  Patient Position: Sitting  Pain Score: 0-No pain  Height and Weight  Height: 6'  "(182.9 cm)  Weight: 103.8 kg (228 lb 13.4 oz)  BSA (Calculated - sq m): 2.3 sq meters  BMI (Calculated): 31  Weight in (lb) to have BMI = 25: 183.9]    Physical Exam           Vital signs reviewed  General PE: In no acute distress, appear stated age  HEENT: PERRL, EOMI, moist mucosa, ears: TM filled with cerumen debris   Chest : clinically clear  CVS: NRRR,S1 and S2 only, no m/r/g, good peripheral pulses, no pedal edema  Abdomen : NABS, flat, no area of tenderness, no palpable organomegaly  Skin- diffuse hypo-hyperpigmented patched on the forearm, upper chest and back      Laboratory:  CBC:  Recent Labs   Lab Result Units 03/18/25  1144   WBC K/uL 7.27   RBC M/uL 5.01   Hemoglobin g/dL 14.7   Hematocrit % 45.0   Platelets K/uL 270   MCV fL 90   MCH pg 29.3   MCHC g/dL 32.7     CMP:  Recent Labs   Lab Result Units 03/18/25  1144   Glucose mg/dL 97   Calcium mg/dL 9.2   Albumin g/dL 3.9   Total Protein g/dL 8.1   Sodium mmol/L 139   Potassium mmol/L 4.1   CO2 mmol/L 24   Chloride mmol/L 108   BUN mg/dL 11   Alkaline Phosphatase U/L 60   ALT U/L 26   AST U/L 35   Total Bilirubin mg/dL 0.8     URINALYSIS:  Recent Labs   Lab Result Units 01/31/25  1824   Color, UA  Yellow   Specific Gravity, UA  1.010   pH, UA  8.5   Protein, UA  Negative   Nitrite, UA  Negative   Leukocytes, UA  Negative   Urobilinogen, UA EU/dL 1.0      LIPIDS:  Recent Labs   Lab Result Units 03/18/25  1144   TSH uIU/mL 1.534   HDL mg/dL 48   Cholesterol mg/dL 196   Triglycerides mg/dL 74   LDL Cholesterol mg/dL 133.2   HDL/Cholesterol Ratio % 24.5   Non-HDL Cholesterol mg/dL 148   Total Cholesterol/HDL Ratio  4.1     TSH:  Recent Labs   Lab Result Units 03/18/25  1144   TSH uIU/mL 1.534     A1C:  No results for input(s): "HGBA1C" in the last 2160 hours.    Radiology:      Assessment/Plan     Cialin Ochoa is a 28 y.o.male with:    1. Cerumen debris on tympanic membrane of both ears  - Ear wax removal    2. Tinea manuum, pedis, and unguium  - " Chronic sinus bradycardia R00.1   2/26/2017 - Present    History of non-ST elevation myocardial infarction (NSTEMI) I25.2   2/26/2017 - Present    Bradycardia R00.1 Medium  3/17/2017 - Present    History of ventricular tachycardia Z86.79 High  3/17/2017 - Present    Aortic stenosis due to bicuspid aortic valve Q23.0, Q23.1 High  3/17/2017 - Present    Cardiomyopathy (CMS-HCC) I42.9 Medium  3/20/2017 - Present    Acute on chronic heart failure (CMS-HCC) I50.9   3/20/2017 - Present    PVC (premature ventricular contraction) I49.3 Medium  3/20/2017 - Present    Chest pain, rule out acute myocardial infarction R07.9 High  3/20/2017 - Present    Chronic combined systolic and diastolic CHF (congestive heart failure) (CMS-HCC) I50.42 Medium  3/21/2017 - Present    Renal insufficiency N28.9 Medium  3/21/2017 - Present    Obesity (BMI 30-39.9) E66.9   3/28/2017 - Present    Chronic anticoagulation Z79.01   4/20/2017 - Present    History of heart valve replacement with bioprosthetic valve [Z95.4] Z95.4   4/20/2017 - Present    S/P AVR (aortic valve replacement) Z95.2   4/28/2017 - Present    Essential hypertension, benign I10   4/28/2017 - Present    Dyslipidemia E78.5   4/28/2017 - Present      Health Maintenance        Date Due Completion Dates    IMM PNEUMOCOCCAL 19-64 (ADULT) MEDIUM RISK SERIES (1 of 1 - PPSV23) 3/25/1977 ---    COLONOSCOPY 10/1/2021 10/1/2016 (N/S), 3/1/2013 (N/S)    Override on 10/1/2016: (N/S)    Override on 3/1/2013: (N/S)    IMM DTaP/Tdap/Td Vaccine (2 - Td) 2/10/2027 2/10/2017            Current Immunizations     Influenza Vaccine Quad Inj (Pf) 10/15/2016    Tdap Vaccine 2/10/2017  8:30 AM      Below and/or attached are the medications your provider expects you to take. Review all of your home medications and newly ordered medications with your provider and/or pharmacist. Follow medication instructions as directed by your provider and/or pharmacist. Please keep your medication list with you and  ciclopirox (PENLAC) 8 % Soln; Apply topically nightly.  Dispense: 18 mL; Refill: 1  - fluconazole (DIFLUCAN) 150 MG Tab; Take 2 tablets (300 mg total) by mouth every 7 days. for 21 days  Dispense: 6 tablet; Refill: 0    3. Tinea versicolor  - fluconazole (DIFLUCAN) 150 MG Tab; Take 2 tablets (300 mg total) by mouth every 7 days. for 21 days  Dispense: 6 tablet; Refill: 0    Assessment & Plan    -LFTs WNL   -due to extensive nature of tinea infections, oral regimen ordered -diflucan  -s/p ear wax removal in office today, procedure tolerated  -labs result done , result pending     37 minutes of total time spent on the encounter, which includes face to face time and non-face to face time preparing to see the patient (eg, review of tests), Obtaining and/or reviewing separately obtained history, Documenting clinical information in the electronic or other health record, Independently interpreting results (not separately reported) and communicating results to the patient or Care coordination (not separately reported).        Jessica Dangelo MD  Internal Medicine   [facility]                     share with your provider. Update the information when medications are discontinued, doses are changed, or new medications (including over-the-counter products) are added; and carry medication information at all times in the event of emergency situations     Allergies:  OTHER MISC - Rash               Medications  Valid as of: May 20, 2017 -  2:39 PM    Generic Name Brand Name Tablet Size Instructions for use    Aspirin (Tablet Delayed Response) ECOTRIN 81 MG Take 81 mg by mouth every day.        Betamethasone Dipropionate Aug (Cream) DIPROLENE-AF 0.05 % Apply 1 g to affected area(s) 2 times a day.        Carvedilol (Tab) COREG 3.125 MG Take 1 Tab by mouth 2 times a day, with meals.        Cholecalciferol (Tab) Vitamin D3 2000 UNITS Take 2,000 Units by mouth every day.        Colchicine (Tab) COLCRYS 0.6 MG 2 tabs by mouth; may repeat x 1 dose if symptoms remain. May repeat dosing the following day as needed for symptoms.        Furosemide (Tab) LASIX 40 MG         Levothyroxine Sodium (Tab) SYNTHROID 25 MCG Take 1 Tab by mouth Every morning on an empty stomach.        Lisinopril (Tab) PRINIVIL 5 MG Take 1 Tab by mouth every day.        Morphine Sulfate (Tab) MS IR 15 MG Take 1 Tab by mouth every four hours as needed for Severe Pain.        OxyCODONE HCl (Tab) ROXICODONE 10 MG Take 1 Tab by mouth every 3 hours as needed (Severe Pain (NRS Pain Scale 7-10; CPOT Pain Scale 6-8)).        Potassium Chloride Marlin CR (Tab CR) KLOR-CON M20 20 MEQ         Pravastatin Sodium (Tab) PRAVACHOL 20 MG Take 2 tablets orally once daily.        PredniSONE (Tab) DELTASONE 10 MG 40mg x 4 days; 30mg x 3 days; 20mg x 2 days; 10mg x 1 day        PredniSONE (Tab) DELTASONE 10 MG Take 1 Tab by mouth every day. Patient is to take 6 tablets on day one, 4 tablets on days 2 and 3, 2 tablets on days 4 and 5 and stop.  Or equivalent dose.        Sennosides-Docusate Sodium (Tab) PERICOLACE or SENOKOT S 8.6-50 MG Take 1 Tab by mouth every day.           Warfarin Sodium (Tab) COUMADIN 7.5 MG Take 1 Tab by mouth COUMADIN-DAILY.        .                 Medicines prescribed today were sent to:     [facility] [place], [place] - [address]    [address] [place] [address]    Phone: [phone] Fax: [phone]    Open 24 Hours?: No      Medication refill instructions:       If your prescription bottle indicates you have medication refills left, it is not necessary to call your provider’s office. Please contact your pharmacy and they will refill your medication.    If your prescription bottle indicates you do not have any refills left, you may request refills at any time through one of the following ways: The online [employer] system (except Urgent Care), by calling your provider’s office, or by asking your pharmacy to contact your provider’s office with a refill request. Medication refills are processed only during regular business hours and may not be available until the next business day. Your provider may request additional information or to have a follow-up visit with you prior to refilling your medication.   *Please Note: Medication refills are assigned a new Rx number when refilled electronically. Your pharmacy may indicate that no refills were authorized even though a new prescription for the same medication is available at the pharmacy. Please request the medicine by name with the pharmacy before contacting your provider for a refill.           [employer] Access Code: [email or web address]  Expires: 6/4/2017 10:32 AM    [employer]  A secure, online tool to manage your health information     [employer]® is a secure, online tool that connects you to your personalized health information from the privacy of your home -- day or night - making it very easy for you to manage your healthcare. Once the activation process is completed, you can even access your medical information using the [employer] kimmy, which is available for free in  the Apple [employer] store or Google Play store.     [employer] provides the following levels of access (as shown below):   My Chart Features   [facility] Doctor [facility]  Specialists [facility]  Urgent  Care Non-[facility]  Primary Care  Doctor   Email your healthcare team securely and privately 24/7 X X X    Manage appointments: schedule your next appointment; view details of past/upcoming appointments X      Request prescription refills. X      View recent personal medical records, including lab and immunizations X X X X   View health record, including health history, allergies, medications X X X X   Read reports about your outpatient visits, procedures, consult and ER notes X X X X   See your discharge summary, which is a recap of your hospital and/or ER visit that includes your diagnosis, lab results, and care plan. X X       How to register for [employer]:  1. Go to  [email or web address].  2. Click on the Sign Up Now box, which takes you to the New Member Sign Up page. You will need to provide the following information:  a. Enter your [employer] Access Code exactly as it appears at the top of this page. (You will not need to use this code after you’ve completed the sign-up process. If you do not sign up before the expiration date, you must request a new code.)   b. Enter your date of birth.   c. Enter your home email address.   d. Click Submit, and follow the next screen’s instructions.  3. Create a [employer] ID. This will be your [employer] login ID and cannot be changed, so think of one that is secure and easy to remember.  4. Create a [employer] password. You can change your password at any time.  5. Enter your Password Reset Question and Answer. This can be used at a later time if you forget your password.   6. Enter your e-mail address. This allows you to receive e-mail notifications when new information is available in [employer].  7. Click Sign Up. You can now view your health information.    For assistance activating your  MyChart account, call [phone]

## 2025-06-05 ENCOUNTER — HOSPITAL ENCOUNTER (OUTPATIENT)
Dept: LAB | Facility: MEDICAL CENTER | Age: 65
End: 2025-06-05
Attending: RADIOLOGY
Payer: COMMERCIAL

## 2025-06-05 LAB
PSA SERPL DL<=0.01 NG/ML-MCNC: 1.05 NG/ML (ref 0–4)
TESTOST SERPL-MCNC: 321 NG/DL (ref 175–781)

## 2025-06-05 PROCEDURE — 84403 ASSAY OF TOTAL TESTOSTERONE: CPT

## 2025-06-05 PROCEDURE — 36415 COLL VENOUS BLD VENIPUNCTURE: CPT

## 2025-06-05 PROCEDURE — 84153 ASSAY OF PSA TOTAL: CPT

## 2025-08-15 ENCOUNTER — HOSPITAL ENCOUNTER (OUTPATIENT)
Dept: LAB | Facility: MEDICAL CENTER | Age: 65
End: 2025-08-15
Attending: FAMILY MEDICINE
Payer: COMMERCIAL

## 2025-08-15 LAB
CA-I SERPL-SCNC: 1.2 MMOL/L (ref 1.1–1.3)
FOLATE SERPL-MCNC: 17.6 NG/ML
MAGNESIUM SERPL-MCNC: 1.8 MG/DL (ref 1.5–2.5)
PTH-INTACT SERPL-MCNC: 56.6 PG/ML (ref 14–72)
TSH SERPL-ACNC: 3.52 UIU/ML (ref 0.38–5.33)
VIT B12 SERPL-MCNC: 526 PG/ML (ref 211–911)

## 2025-08-15 PROCEDURE — 82607 VITAMIN B-12: CPT

## 2025-08-15 PROCEDURE — 83735 ASSAY OF MAGNESIUM: CPT

## 2025-08-15 PROCEDURE — 82746 ASSAY OF FOLIC ACID SERUM: CPT

## 2025-08-15 PROCEDURE — 82330 ASSAY OF CALCIUM: CPT

## 2025-08-15 PROCEDURE — 36415 COLL VENOUS BLD VENIPUNCTURE: CPT

## 2025-08-15 PROCEDURE — 83970 ASSAY OF PARATHORMONE: CPT

## 2025-08-15 PROCEDURE — 84443 ASSAY THYROID STIM HORMONE: CPT

## (undated) DEVICE — TUBING CLEARLINK DUO-VENT - C-FLO (48EA/CA)

## (undated) DEVICE — GOWN SURGEONS X-LARGE - DISP. (30/CA)

## (undated) DEVICE — TUBE CONNECT SUCTION CLEAR 120 X 1/4" (50EA/CA)"

## (undated) DEVICE — KIT RADIAL ARTERY 20GA W/MAX BARRIER AND BIOPATCH  (5EA/CA) #10740 IS FOR THE SET RADIAL ARTERIAL

## (undated) DEVICE — WIRE STEEL 5-0 B&S 20 OHS - 5/PK 12PK/BX ITEM. D5329 OR D6625 CAN BE USED AS A SUB

## (undated) DEVICE — BAG RESUSCITATION DISPOSABLE - WITH MASK (10 EA/CA)

## (undated) DEVICE — BLADE STERNUM SAW SURGICAL 32.0 X 6.4 MM STERILE (1/EA)

## (undated) DEVICE — SET FLUID WARMING STANDARD FLOW - (10/CA)

## (undated) DEVICE — GOWN WARMING STANDARD FLEX - (30/CA)

## (undated) DEVICE — SOD. CHL. INJ. 0.9% 1000 ML - (14EA/CA 60CA/PF)

## (undated) DEVICE — NEPTUNE 4 PORT MANIFOLD - (20/PK)

## (undated) DEVICE — Device

## (undated) DEVICE — SUCTION INSTRUMENT YANKAUER BULBOUS TIP W/O VENT (50EA/CA)

## (undated) DEVICE — SYS DLV COST CLS RM TEMP - INJECTATE (CO-SET II) (10EA/CA)

## (undated) DEVICE — GLOVE BIOGEL SZ 7.5 SURGICAL PF LTX - (50PR/BX 4BX/CA)

## (undated) DEVICE — TUBE CHEST 32FR. RIGHT ANGLED (10EA/CA)

## (undated) DEVICE — HEAD HOLDER JUNIOR/ADULT

## (undated) DEVICE — SLEEVE, VASO, THIGH, MED

## (undated) DEVICE — SUTURE 5-0 PROLENE RB-1 D/A 36 (36PK/BX)"

## (undated) DEVICE — LACTATED RINGERS INJ 1000 ML - (14EA/CA 60CA/PF)

## (undated) DEVICE — MICRODRIP PRIMARY VENTED 60 (48EA/CA) THIS WAS PART #2C8428 WHICH WAS DISCONTINUED

## (undated) DEVICE — ELECTRODE 850 FOAM ADHESIVE - HYDROGEL RADIOTRNSPRNT (50/PK)

## (undated) DEVICE — MASK ANESTHESIA ADULT  - (100/CA)

## (undated) DEVICE — DERMABOND ADVANCED - (12EA/BX)

## (undated) DEVICE — ORGANIZER SUTURE GABBAY-FRAT - ER STERILE (3/SET 4ST/BX)

## (undated) DEVICE — ELECTRODE DUAL RETURN W/ CORD - (50/PK)

## (undated) DEVICE — SET EXTENSION WITH 2 PORTS (48EA/CA) ***PART #2C8610 IS A SUBSTITUTE*****

## (undated) DEVICE — SUTURE OHS

## (undated) DEVICE — TRAY SURESTEP FOLEY TEMP SENSING 16FR (10EA/CA) ORDER  #18764 FOR TEMP FOLEY ONLY

## (undated) DEVICE — INSERT STEALTH #5 - (10/BX)

## (undated) DEVICE — KIT INTROPERCUTANEOUS SHEATH - 8.5 FR W/MAX BARRIER AND BIOPATCH  (5/CA)

## (undated) DEVICE — TUBE CHEST 32FR. STRAIGHT - (10EA/CA)

## (undated) DEVICE — CANISTER SUCTION 3000ML MECHANICAL FILTER AUTO SHUTOFF MEDI-VAC NONSTERILE LF DISP  (40EA/CA)

## (undated) DEVICE — PACK E SUTURE USED FOR - OPEN HEART  (5/BX)

## (undated) DEVICE — DEVICE KIT COR-KNOT COMBO2 DEVICES & 12 KNOTS PER KIT (6KT/CA)

## (undated) DEVICE — BAG, SPONGE COUNT 50600

## (undated) DEVICE — SET BIFURCATED BLOOD - (48EA/CS)

## (undated) DEVICE — SODIUM CHL. INJ. 0.9% 500ML (24EA/CA 50CA/PF)

## (undated) DEVICE — QUICKLOADS COR-KNOT TITANIUM - (6EA/PK 12PK/BX)

## (undated) DEVICE — GLOVE BIOGEL INDICATOR SZ 8 SURGICAL PF LTX - (50/BX 4BX/CA)

## (undated) DEVICE — DRAIN CHEST ADULT (6EA/CA) DELETED ITEM  ORDER #15909

## (undated) DEVICE — BONE WAX (12PK/BX)

## (undated) DEVICE — TRANSDUCER BIFURCATED MONITORING KIT (10EA/CA)

## (undated) DEVICE — SENSOR SPO2 NEO LNCS ADHESIVE (20/BX) SEE USER NOTES

## (undated) DEVICE — ARMBOARD  SMALL IV 9 INLONG - (25EA/CA)

## (undated) DEVICE — PACK CV DRAPING/BASIN 2PART - (1/CA)

## (undated) DEVICE — SUTURE 0 ETHIBOND MO6 C/R - (12/BX) 8-18 INCH ETHICON

## (undated) DEVICE — QUICKLOADS COR-KNOT TITANIUM - (12/BX)

## (undated) DEVICE — PROTECTOR ULNA NERVE - (36PR/CA)

## (undated) DEVICE — LEAD PACING TEMP MYO - (12/BX)

## (undated) DEVICE — FIBRILLAR SURGICEL 4X4 - 10/CA

## (undated) DEVICE — SENSOR CEREBRAL AND SOMATIC MONITORING (20/CA)

## (undated) DEVICE — SET LEADWIRE 5 LEAD BEDSIDE DISPOSABLE ECG (1SET OF 5/EA)

## (undated) DEVICE — BLADE SURGICAL #15 - (50/BX 3BX/CA)

## (undated) DEVICE — SUTURE 2-0 ETHIBOND V-5 - (6/BX)

## (undated) DEVICE — CATHETER THERMALDILUTION SWAN - (5EA/CA)

## (undated) DEVICE — SODIUM CHL IRRIGATION 0.9% 1000ML (12EA/CA)

## (undated) DEVICE — SUTURE 4-0 30CM STRATAFIX SPIRAL PS-2 (12EA/BX)

## (undated) DEVICE — LEAD SET 6 DISP. EKG NIHON KOHDEN (100EA/CA) [9859].

## (undated) DEVICE — KIT ROOM DECONTAMINATION

## (undated) DEVICE — STOPCOCK MALE 4-WAY - (50/CA)

## (undated) DEVICE — KIT ANESTHESIA W/CIRCUIT & 3/LT BAG W/FILTER (20EA/CA)